# Patient Record
Sex: FEMALE | Race: BLACK OR AFRICAN AMERICAN | HISPANIC OR LATINO | Employment: UNEMPLOYED | URBAN - METROPOLITAN AREA
[De-identification: names, ages, dates, MRNs, and addresses within clinical notes are randomized per-mention and may not be internally consistent; named-entity substitution may affect disease eponyms.]

---
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
## 2024-01-09 PROBLEM — R65.10 SIRS (SYSTEMIC INFLAMMATORY RESPONSE SYNDROME) (HCC): Status: RESOLVED | Noted: 2023-12-16 | Resolved: 2024-01-09

## 2024-01-09 PROBLEM — R79.89 ELEVATED TROPONIN: Status: RESOLVED | Noted: 2022-06-21 | Resolved: 2024-01-09

## 2024-01-09 NOTE — PROGRESS NOTES
Name: Lakhwinder Puckett      : 2005      MRN: [id number]  Encounter Provider: Elias Schoen, MD  Encounter Date: 1/10/2024   Encounter department: [facility]    Assessment & Plan     1. Severe persistent asthma, poorly-controlled, with acute exacerbation  Assessment & Plan:  Patient is no longer having an exacerbation of her asthma.  She has scheduled PFTs next week, she also has pulmonology visit scheduled next week.  Will hold off on Dupixent in order as patient will be seen by pulmonology and they can make the call whether this is warranted at this time.  - Continue current therapy with Breo Ellipta and as needed albuterol  - Follow-up in 2 weeks post pulmonology appointment, may need prior authorization at this time    Orders:  -     fluticasone-vilanterol 200-25 mcg/actuation inhaler; Inhale 1 puff once daily Rinse mouth after use. Take this medication EVERDAY    2. Eosinophilic asthma  Assessment & Plan:  Indication for Dupixent, as well as allergies to multiple environmental allergens.  - Considering patient's current psychiatric troubles would greatly benefit from discontinuation of montelukast, will hopefully be removed from this medication and started on Dupixent with pulmonology's blessing      3. Anxiety  Assessment & Plan:  Patient with very high anxiety partially secondary to underlying medical condition of asthma.  Patient is not getting significant relief from her as needed hydroxyzine 10 mg she has not tried to 20 mg as this makes her nervous.  She would like treatment for her anxiety at this time and is interested in finding therapy as well as taking medication  - We discussed side effects of SSRI and she was understanding, escitalopram started at low-dose will follow-up in 2 weeks to see if increase is needed  - Paperwork filled out for her work to give her another 2 weeks to improve, see her pulmonologist and prepare her to return to work.    Orders:  -      escitalopram (LEXAPRO) 5 mg tablet; Take 1 tablet (5 mg total) by mouth daily        Depression Screening and Follow-up Plan: Patient was screened for depression during today's encounter. They screened negative with a PHQ-2 score of 2.        Subjective      Patient is a 18-year-old female here with follow-up for asthma.    Patient reports she has been doing well since her last visit, she has not had any exacerbations.  She has been taking her controller inhaler daily as well as her montelukast and hydroxyzine.  She was prescribed Dupixent at her last visit as she has a significant allergic component suspected regarding her asthma.  She states she has been taking her albuterol 2 times a day since her last visit with us.  She has PFTs follow-up and 1 week she also has a pulmonology appointment coming up in 1 week.  She reports no other symptoms and had no issues coming off the prednisone.    Dupixent has not been administered as prior authorization is required    Patient reports significant anxiety, she is having difficulty returning to work.  She is very worried about her breathing, she enjoys her we work though.  Mom is worried that she is not getting back out there and is trying to support her but is finding it difficult to motivate her to get outside and go for a walk etc.  Patient is willing to go for a walk and try and be more active and understands that it is the best way to combat her anxiety.      Review of Systems   Constitutional:  Negative for chills and fever.   HENT:  Negative for sore throat.    Respiratory:  Negative for shortness of breath.    Cardiovascular:  Negative for chest pain and palpitations.   Gastrointestinal:  Negative for abdominal pain.   Genitourinary:  Negative for dysuria.   Musculoskeletal:  Negative for myalgias.   Neurological:  Negative for weakness.       Current Outpatient Medications on File Prior to Visit   Medication Sig    albuterol (2.5 mg/3 mL) 0.083 % nebulizer solution  Take 3 mL (2.5 mg total) by nebulization every 6 (six) hours as needed for wheezing or shortness of breath    albuterol (PROVENTIL HFA,VENTOLIN HFA) 90 mcg/act inhaler INHALE 2 PUFFS EVERY 4 HOURS AS NEEDED FOR WHEEZING OR SHORTNESS OF BREATH    benzonatate (TESSALON PERLES) 100 mg capsule Take 1 capsule (100 mg total) by mouth every 8 (eight) hours    dupilumab (DUPIXENT) subcutaneous injection Inject 400 mg under the skin every 14 (fourteen) days for 14 days, THEN 200 mg every 14 (fourteen) days for 28 days.    fluticasone (FLONASE) 50 mcg/act nasal spray 1 spray into each nostril daily    fluticasone-vilanterol 200-25 mcg/actuation inhaler Inhale 1 puff once daily Rinse mouth after use. Take this medication EVERDAY    hydrOXYzine HCL (ATARAX) 10 mg tablet Take 1 tablet (10 mg total) by mouth every 6 (six) hours as needed for anxiety, itching or allergies    montelukast (SINGULAIR) 10 mg tablet Take 1 tablet (10 mg total) by mouth daily at bedtime    predniSONE 10 mg tablet 6 tabs daily for 2 days, then 5 tabs for 2 days then 4 tabs for 2 days then 3 tabs for 2 days then 2 tab for 2 days then 1 tab for 2 days then stop stop       Objective     /60 (BP Location: Left arm, Patient Position: Sitting, Cuff Size: Standard)   Pulse 103   Temp 97.7 °F (36.5 °C) (Temporal)   Wt 88 kg (193 lb 14.4 oz)   SpO2 99%   BMI 33.28 kg/m²     Physical Exam  Constitutional:       Appearance: Normal appearance.   Eyes:      Extraocular Movements: Extraocular movements intact.      Pupils: Pupils are equal, round, and reactive to light.   Cardiovascular:      Rate and Rhythm: Normal rate and regular rhythm.      Heart sounds: Normal heart sounds. No murmur heard.  Pulmonary:      Effort: Pulmonary effort is normal.      Breath sounds: Normal breath sounds. No wheezing.   Musculoskeletal:         General: Normal range of motion.      Cervical back: Normal range of motion.   Skin:     General: Skin is warm.   Neurological:     "  General: No focal deficit present.      Mental Status: She is alert.   Psychiatric:         Mood and Affect: Mood normal.       Portions of the record were created with voice recognition software.  Occasional wrong word or \"sound a like\" substitutions may have occurred due to the inherent limitations of voice recognition software.  Read the chart carefully and recognize, using context, where substitutions have occurred.      Elias Schoen, MD    "

## 2024-01-10 ENCOUNTER — OFFICE VISIT (OUTPATIENT)
Dept: FAMILY MEDICINE CLINIC | Facility: CLINIC | Age: 19
End: 2024-01-10

## 2024-01-10 VITALS
WEIGHT: 193.9 LBS | OXYGEN SATURATION: 99 % | TEMPERATURE: 97.7 F | SYSTOLIC BLOOD PRESSURE: 110 MMHG | DIASTOLIC BLOOD PRESSURE: 60 MMHG | HEART RATE: 103 BPM | BODY MASS INDEX: 33.28 KG/M2

## 2024-01-10 DIAGNOSIS — J45.51 SEVERE PERSISTENT ASTHMA, POORLY-CONTROLLED, WITH ACUTE EXACERBATION: Primary | ICD-10-CM

## 2024-01-10 DIAGNOSIS — J82.83 EOSINOPHILIC ASTHMA: ICD-10-CM

## 2024-01-10 DIAGNOSIS — F41.9 ANXIETY: ICD-10-CM

## 2024-01-10 PROCEDURE — 99213 OFFICE O/P EST LOW 20 MIN: CPT | Performed by: FAMILY MEDICINE

## 2024-01-10 RX ORDER — FLUTICASONE FUROATE AND VILANTEROL 200; 25 UG/1; UG/1
1 POWDER RESPIRATORY (INHALATION)
Qty: 60 BLISTER | Refills: 2 | Status: SHIPPED | OUTPATIENT
Start: 2024-01-10 | End: 2024-04-09

## 2024-01-10 RX ORDER — ESCITALOPRAM OXALATE 5 MG/1
5 TABLET ORAL DAILY
Qty: 30 TABLET | Refills: 1 | Status: SHIPPED | OUTPATIENT
Start: 2024-01-10

## 2024-01-12 NOTE — ASSESSMENT & PLAN NOTE
Indication for Dupixent, as well as allergies to multiple environmental allergens.  - Considering patient's current psychiatric troubles would greatly benefit from discontinuation of montelukast, will hopefully be removed from this medication and started on Dupixent with pulmonology's blessing

## 2024-01-12 NOTE — ASSESSMENT & PLAN NOTE
Patient is no longer having an exacerbation of her asthma.  She has scheduled PFTs next week, she also has pulmonology visit scheduled next week.  Will hold off on Dupixent in order as patient will be seen by pulmonology and they can make the call whether this is warranted at this time.  - Continue current therapy with Breo Ellipta and as needed albuterol  - Follow-up in 2 weeks post pulmonology appointment, may need prior authorization at this time

## 2024-01-12 NOTE — ASSESSMENT & PLAN NOTE
Patient with very high anxiety partially secondary to underlying medical condition of asthma.  Patient is not getting significant relief from her as needed hydroxyzine 10 mg she has not tried to 20 mg as this makes her nervous.  She would like treatment for her anxiety at this time and is interested in finding therapy as well as taking medication  - We discussed side effects of SSRI and she was understanding, escitalopram started at low-dose will follow-up in 2 weeks to see if increase is needed  - Paperwork filled out for her work to give her another 2 weeks to improve, see her pulmonologist and prepare her to return to work.

## 2024-01-18 ENCOUNTER — TELEPHONE (OUTPATIENT)
Dept: FAMILY MEDICINE CLINIC | Facility: CLINIC | Age: 19
End: 2024-01-18

## 2024-01-18 NOTE — TELEPHONE ENCOUNTER
01/18/24    Called Pt and No Answer.     Left Detailed Message that Form is completed and ready to be Picked-Up.      Form copied, scanned into Pt chart and placed at  Pick -Up bin, under the letter M.      Form: PROVIDER EVALUATION

## 2024-01-18 NOTE — TELEPHONE ENCOUNTER
01/18/24    PT SIGNED AND PICKED - UP FORM.      FORM FAXED AND CONFIRMED THAT IT WAS RECEIVED 01/18/24, COPIED AND SCANNED INTO PT CHART .

## 2024-01-19 DIAGNOSIS — J45.51 SEVERE PERSISTENT ASTHMA, POORLY-CONTROLLED, WITH ACUTE EXACERBATION: ICD-10-CM

## 2024-01-19 RX ORDER — MONTELUKAST SODIUM 10 MG/1
10 TABLET ORAL
Qty: 90 TABLET | Refills: 0 | Status: SHIPPED | OUTPATIENT
Start: 2024-01-19

## 2024-01-25 ENCOUNTER — OFFICE VISIT (OUTPATIENT)
Dept: FAMILY MEDICINE CLINIC | Facility: CLINIC | Age: 19
End: 2024-01-25

## 2024-01-25 VITALS
TEMPERATURE: 97.6 F | BODY MASS INDEX: 33.93 KG/M2 | RESPIRATION RATE: 18 BRPM | HEIGHT: 63 IN | DIASTOLIC BLOOD PRESSURE: 62 MMHG | OXYGEN SATURATION: 99 % | HEART RATE: 82 BPM | WEIGHT: 191.5 LBS | SYSTOLIC BLOOD PRESSURE: 120 MMHG

## 2024-01-25 DIAGNOSIS — J45.51 SEVERE PERSISTENT ASTHMA, POORLY-CONTROLLED, WITH ACUTE EXACERBATION: ICD-10-CM

## 2024-01-25 DIAGNOSIS — F41.9 ANXIETY: Primary | ICD-10-CM

## 2024-01-25 PROCEDURE — 99214 OFFICE O/P EST MOD 30 MIN: CPT

## 2024-01-25 RX ORDER — ESCITALOPRAM OXALATE 10 MG/1
10 TABLET ORAL DAILY
Qty: 30 TABLET | Refills: 2 | Status: SHIPPED | OUTPATIENT
Start: 2024-01-25 | End: 2024-01-29

## 2024-01-25 NOTE — PROGRESS NOTES
Name: Lakhwinder Puckett      : 2005      MRN: [id number]  Encounter Provider: JHONY Castro  Encounter Date: 2024   Encounter department: [facility]    Assessment & Plan     1. Anxiety  Assessment & Plan:  Good initial response to Lexapro without side effects.  - Increase Lexapro to therapeutic dose of 10 mg daily.  - Follow up in 4 weeks.      2. Severe persistent asthma, poorly-controlled, with acute exacerbation  Assessment & Plan:  Currently asymptomatic, good response to Breo. Pulmonology has changed Breo to Trelegy but pt has not obtained from pharmacy due to estimated co-pay listed on [facility].  - Continue all medications and plan as per Pulmonology.   - Avoid allergens/triggers whenever possible.   - Advised pt to call/go to pharmacy to learn actual co-pay. If unable to afford, contact Pulmonology office to determine whether samples are available or if patient assistance program or prior authorization needs to be completed for the medication.   - Return to work form completed and returned to pt.             Lakhwinder COTO    Lakhwinder presents to the office accompanied for anxiety follow up and to complete a return to work form. Pt has been out of work since she was hospitalized for asthma exacerbation in December. Pt had not been using her controller medication as she had not had symptoms in some time. Since hospitalization, pt has had follow up with Pulmonology and is on a regimen providing her good symptom control, however has not picked up the Trelegy that they most recently prescribed for her to replace Breo due to the estimated co-pay shown on her pharmacy [facility]. Biologic therapy with Dupixent is being considered if pt continues to have recurrent exacerbations.   Today, pt is feeling well and believes she is ready to return to work. 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 Pt is noticing an improvement in her anxiety with use of Lexapro, without any side effects.       Review of Systems   Constitutional:  Negative for fatigue, fever and unexpected weight change.   Respiratory:  Negative for cough, chest tightness, shortness of breath and wheezing.    Cardiovascular:  Negative for chest pain and palpitations.   Gastrointestinal:  Negative for abdominal pain, diarrhea, nausea and vomiting.   Neurological:  Negative for dizziness, light-headedness and headaches.   Psychiatric/Behavioral:  Negative for dysphoric mood and sleep disturbance. The patient is nervous/anxious (improving).    All other systems reviewed and are negative.      Past Medical History:   Diagnosis Date    Asthma     Leukocytosis     SIRS (systemic inflammatory response syndrome) (HCC)      History reviewed. No pertinent surgical history.  Family History   Problem Relation Age of Onset    Thyroid disease Mother     No Known Problems Father      Social History     Socioeconomic History    Marital status: Single     Spouse name: None    Number of children: None    Years of education: None    Highest education level: None   Occupational History    None   Tobacco Use    Smoking status: Never    Smokeless tobacco: Never   Vaping Use    Vaping status: Never Used   Substance and Sexual Activity    Alcohol use: Not Currently    Drug use: Not Currently    Sexual activity: None   Other Topics Concern    None   Social History Narrative    None     Social Determinants of Health     Financial Resource Strain: Low Risk  (1/25/2024)    Overall Financial Resource Strain (CARDIA)     Difficulty of Paying Living Expenses: Not very hard   Food Insecurity: No Food Insecurity (1/25/2024)    Hunger Vital Sign     Worried About Running Out of Food in the Last Year: Never true     Ran Out of Food in the Last Year: Never true   Transportation Needs: No Transportation Needs (1/25/2024)    PRAPARE - Transportation      Lack of Transportation (Medical): No     Lack of Transportation (Non-Medical): No   Physical Activity: Not on file   Stress: Not on file   Social Connections: Not on file   Intimate Partner Violence: Not on file   Housing Stability: Not on file     Current Outpatient Medications on File Prior to Visit   Medication Sig    levalbuterol (XOPENEX HFA) 45 mcg/act inhaler INHALE 2 PUFFS EVERY 6 HOURS AS NEEDED FOR WHEEZING    montelukast (SINGULAIR) 10 mg tablet TAKE 1 TABLET BY MOUTH DAILY AT BEDTIME    Trelegy Ellipta 200-62.5-25 MCG/ACT AEPB inhaler Inhale 1 puff daily    albuterol (2.5 mg/3 mL) 0.083 % nebulizer solution Take 3 mL (2.5 mg total) by nebulization every 6 (six) hours as needed for wheezing or shortness of breath    albuterol (PROVENTIL HFA,VENTOLIN HFA) 90 mcg/act inhaler INHALE 2 PUFFS EVERY 4 HOURS AS NEEDED FOR WHEEZING OR SHORTNESS OF BREATH    dupilumab (DUPIXENT) subcutaneous injection Inject 400 mg under the skin every 14 (fourteen) days for 14 days, THEN 200 mg every 14 (fourteen) days for 28 days.    fluticasone (FLONASE) 50 mcg/act nasal spray 1 spray into each nostril daily    [DISCONTINUED] fluticasone-vilanterol 200-25 mcg/actuation inhaler Inhale 1 puff once daily Rinse mouth after use. Take this medication EVERDAY     No Known Allergies  Immunization History   Administered Date(s) Administered    COVID-19 PFIZER VACCINE 0.3 ML IM 06/14/2021, 07/12/2021    DTaP 01/30/2006, 07/18/2008    DTaP / Hep B / IPV 2004, 2004, 2004    HPV9 09/23/2016, 08/06/2021    Hep A, ped/adol, 2 dose 09/23/2016    Hep B, Adolescent or Pediatric 2004    Hepatitis A 09/23/2016    Hib (PRP-T) 2004, 2004, 01/10/2005    INFLUENZA 2004, 01/10/2005, 09/23/2016    IPV 07/18/2008    Influenza, injectable, quadrivalent, preservative free 0.5 mL 09/29/2021    MMR 01/30/2006, 07/18/2008    Meningococcal MCV4P 09/23/2016    Meningococcal Polysaccharide (MPSV4) 08/06/2021     "Pneumococcal Conjugate PCV 7 2004, 2004    Pneumococcal Conjugate Vaccine 20-valent (Pcv20), Polysace 12/27/2023    Tdap 09/23/2016    Varicella 01/30/2006, 07/18/2008    meningococcal ACYW-135 TT Conjugate 08/06/2021       Objective     /62 (BP Location: Right arm, Patient Position: Sitting, Cuff Size: Standard)   Pulse 82   Temp 97.6 °F (36.4 °C) (Temporal)   Resp 18   Ht 5' 3\" (1.6 m)   Wt 86.9 kg (191 lb 8 oz)   LMP 01/12/2024   SpO2 99%   BMI 33.92 kg/m²     Physical Exam  Vitals reviewed.   Constitutional:       General: She is not in acute distress.     Appearance: She is obese. She is not ill-appearing or diaphoretic.   HENT:      Head: Normocephalic and atraumatic.      Nose: Nose normal.      Mouth/Throat:      Mouth: Mucous membranes are moist.   Eyes:      General: Lids are normal.      Conjunctiva/sclera: Conjunctivae normal.      Pupils: Pupils are equal, round, and reactive to light.   Cardiovascular:      Rate and Rhythm: Normal rate and regular rhythm.      Heart sounds: Normal heart sounds. No murmur heard.  Pulmonary:      Effort: Pulmonary effort is normal. No tachypnea.      Breath sounds: Normal breath sounds. No decreased breath sounds, wheezing, rhonchi or rales.   Skin:     General: Skin is warm and dry.      Capillary Refill: Capillary refill takes less than 2 seconds.   Neurological:      Mental Status: She is alert and oriented to person, place, and time.   Psychiatric:         Attention and Perception: Attention normal.         Mood and Affect: Mood and affect normal.         Speech: Speech normal.         Behavior: Behavior normal.         Thought Content: Thought content normal.       JHONY Castro    "

## 2024-01-27 DIAGNOSIS — F41.9 ANXIETY: ICD-10-CM

## 2024-01-29 RX ORDER — ESCITALOPRAM OXALATE 10 MG/1
10 TABLET ORAL DAILY
Qty: 90 TABLET | Refills: 1 | Status: SHIPPED | OUTPATIENT
Start: 2024-01-29

## 2024-01-31 ENCOUNTER — TELEPHONE (OUTPATIENT)
Dept: FAMILY MEDICINE CLINIC | Facility: CLINIC | Age: 19
End: 2024-01-31

## 2024-01-31 RX ORDER — FLUTICASONE FUROATE, UMECLIDINIUM BROMIDE AND VILANTEROL TRIFENATATE 200; 62.5; 25 UG/1; UG/1; UG/1
1 POWDER RESPIRATORY (INHALATION) DAILY
COMMUNITY
Start: 2024-01-23 | End: 2024-02-07

## 2024-01-31 RX ORDER — LEVALBUTEROL TARTRATE 45 UG/1
AEROSOL, METERED ORAL
COMMUNITY
Start: 2023-11-21

## 2024-01-31 NOTE — ASSESSMENT & PLAN NOTE
Good initial response to Lexapro without side effects.  - Increase Lexapro to therapeutic dose of 10 mg daily.  - Follow up in 4 weeks.

## 2024-01-31 NOTE — ASSESSMENT & PLAN NOTE
Currently asymptomatic, good response to Breo. Pulmonology has changed Breo to Trelegy but pt has not obtained from pharmacy due to estimated co-pay listed on [facility].  - Continue all medications and plan as per Pulmonology.   - Avoid allergens/triggers whenever possible.   - Advised pt to call/go to pharmacy to learn actual co-pay. If unable to afford, contact Pulmonology office to determine whether samples are available or if patient assistance program or prior authorization needs to be completed for the medication.   - Return to work form completed and returned to pt.

## 2024-01-31 NOTE — TELEPHONE ENCOUNTER
01/31/24    PCP SIGNATURE NEEDED FOR   [facility] Dupixent [facility]     FORM RECEIVED VIA FAX AND PLACED IN PCP FOLDER TO BE DELIVERED AT ASSIGNED TIMES.

## 2024-02-02 ENCOUNTER — TELEPHONE (OUTPATIENT)
Dept: FAMILY MEDICINE CLINIC | Facility: CLINIC | Age: 19
End: 2024-02-02

## 2024-02-07 ENCOUNTER — TELEPHONE (OUTPATIENT)
Dept: FAMILY MEDICINE CLINIC | Facility: CLINIC | Age: 19
End: 2024-02-07

## 2024-02-07 ENCOUNTER — OFFICE VISIT (OUTPATIENT)
Dept: FAMILY MEDICINE CLINIC | Facility: CLINIC | Age: 19
End: 2024-02-07

## 2024-02-07 VITALS
RESPIRATION RATE: 16 BRPM | HEART RATE: 95 BPM | BODY MASS INDEX: 33.84 KG/M2 | OXYGEN SATURATION: 99 % | HEIGHT: 63 IN | TEMPERATURE: 98 F | SYSTOLIC BLOOD PRESSURE: 118 MMHG | WEIGHT: 191 LBS | DIASTOLIC BLOOD PRESSURE: 78 MMHG

## 2024-02-07 DIAGNOSIS — J45.40 MODERATE PERSISTENT ASTHMA WITHOUT COMPLICATION: Primary | ICD-10-CM

## 2024-02-07 DIAGNOSIS — J45.51 SEVERE PERSISTENT ASTHMA, POORLY-CONTROLLED, WITH ACUTE EXACERBATION: Primary | ICD-10-CM

## 2024-02-07 DIAGNOSIS — F41.9 ANXIETY: ICD-10-CM

## 2024-02-07 PROCEDURE — 99213 OFFICE O/P EST LOW 20 MIN: CPT | Performed by: FAMILY MEDICINE

## 2024-02-07 RX ORDER — PREDNISONE 20 MG/1
40 TABLET ORAL DAILY
Qty: 8 TABLET | Refills: 0 | Status: SHIPPED | OUTPATIENT
Start: 2024-02-07

## 2024-02-07 RX ORDER — ALBUTEROL SULFATE 90 UG/1
2 AEROSOL, METERED RESPIRATORY (INHALATION) 4 TIMES DAILY
Qty: 18 G | Refills: 2 | Status: SHIPPED | OUTPATIENT
Start: 2024-02-07

## 2024-02-07 RX ORDER — FLUTICASONE FUROATE AND VILANTEROL 200; 25 UG/1; UG/1
1 POWDER RESPIRATORY (INHALATION) DAILY
Qty: 180 BLISTER | Refills: 3 | Status: SHIPPED | OUTPATIENT
Start: 2024-02-07 | End: 2025-02-01

## 2024-02-07 RX ORDER — BUDESONIDE AND FORMOTEROL FUMARATE DIHYDRATE 160; 4.5 UG/1; UG/1
2 AEROSOL RESPIRATORY (INHALATION) 2 TIMES DAILY
Qty: 30.6 G | Refills: 3 | Status: SHIPPED | OUTPATIENT
Start: 2024-02-07 | End: 2024-02-07

## 2024-02-07 NOTE — TELEPHONE ENCOUNTER
Patient came in and was advised by pharmacy that following medication (budesonide-formoterol (SYMBICORT) 160-4.5 mcg/act inhaler ) is not covered by insurance. Please send Cosme Ellipta 200-62.5-25 MCG/ACT AEPB inhaler.    
DC by MD/Private car

## 2024-02-07 NOTE — PATIENT INSTRUCTIONS
If you have an exacerbation take 2 pills of the prednisone.  Make sure you are seen by medical professional the next day or at least speak to myself    Please follow-up at the pharmacy and ask about your co-pay for medications and if there is a deductible on the plan and if this is met the medications will be covered fully by the insurance.  Also ask which inhalers are best in terms of price and co-pay.    Take the Symbicort as you would take the Advair 2 puffs daily if you are able to get this medication.  Please reach out if you are unable to get a controller medication (controller medications have a steroid that keeps the inflammation down in the airways)    I would very much like you to have albuterol on hand this would be either a nebulizer or a pump this can be used if you have symptoms but not a severe exacerbation    If you have a severe exacerbation you should follow-up in the emergency room such as you cannot breathe.    If you are having an exacerbation take a nebulizer then repeat in 1 hour if you are still having symptoms, repeat again in 1 hour if you are having symptoms

## 2024-02-07 NOTE — PROGRESS NOTES
Name: Lakhwinder Puckett      : 2005      MRN: [id number]  Encounter Provider: Elias Schoen, MD  Encounter Date: 2024   Encounter department: [facility]    Assessment & Plan     1. Moderate persistent asthma without complication  Assessment & Plan:  Holding off on Dupixent at this time as patient appears to be well-controlled on the Breo Ellipta.  Will consider Dupixent if she has exacerbations on adequate controller medications.  - Working with patient to find appropriate option for controller inhaler, Breo Ellipta appears to be preferred  - Alternate therapy could be a budesonide nebulizer if inhalers are financially unviable  - Albuterol inhaler provided to patient  - Instructions regarding acute exacerbation including back-to-back albuterol treatments x 3 as well as pocket prescription of prednisone, patient to take 40 mg x 1 and make sure she is seen by a medical professional or discusses her exacerbation with provider the next day  -Concerning patient's past collapses with hypoxic events there is concern for vocal cord dysfunction per pulmonology and ED providers, ENT referral provided family aware and willing to follow this up    Orders:  -     albuterol (PROVENTIL HFA,VENTOLIN HFA) 90 mcg/act inhaler; Inhale 2 puffs 4 (four) times a day  -     Ambulatory Referral to Otolaryngology; Future  -     predniSONE 20 mg tablet; Take 2 tablets (40 mg total) by mouth daily  -     fluticasone-vilanterol 200-25 mcg/actuation inhaler; Inhale 1 puff daily Rinse mouth after use.    2. Anxiety  Assessment & Plan:  Well-controlled at this time continue escitalopram 10 mg daily             Subjective      Patient is a 18-year-old female here for follow-up asthma and depression/anxiety    Patient reports she is doing much better on the escitalopram 10 mg.  She is getting ready to go back to work next week.    Patient was seen by her pulmonologist for the first visit.  He stated that  she should be on a controller inhaler, he prescribed her Trelegy Ellipta as he Breo Ellipta had a $400 co-pay.  Cosme though had a $600 co-pay when they went to the pharmacy.  She is currently using the rest of her Breo Ellipta but is concerned about finding an alternative.  They are not sure if she has a deductible that requires them to meet each year as in the past they had free prescriptions of the Breo.  Patient states that her breathing has been fine and she feels ready to go back to work from a pulmonary perspective.    Patient was somewhat aware of the concern for vocal cord dysfunction that caused her to collapse.  She is willing to get further evaluation with ENT regarding this issue.      Review of Systems   Constitutional:  Negative for chills and fever.   HENT:  Negative for sore throat.    Respiratory:  Negative for chest tightness, shortness of breath and wheezing.    Cardiovascular:  Negative for chest pain and palpitations.   Gastrointestinal:  Negative for abdominal pain.   Genitourinary:  Negative for dysuria.   Musculoskeletal:  Negative for myalgias.   Neurological:  Negative for weakness.   Psychiatric/Behavioral:  The patient is nervous/anxious.        Current Outpatient Medications on File Prior to Visit   Medication Sig    montelukast (SINGULAIR) 10 mg tablet TAKE 1 TABLET BY MOUTH DAILY AT BEDTIME    albuterol (2.5 mg/3 mL) 0.083 % nebulizer solution Take 3 mL (2.5 mg total) by nebulization every 6 (six) hours as needed for wheezing or shortness of breath    dupilumab (DUPIXENT) subcutaneous injection Inject 400 mg under the skin every 14 (fourteen) days for 14 days, THEN 200 mg every 14 (fourteen) days for 28 days.    escitalopram (LEXAPRO) 10 mg tablet TAKE 1 TABLET BY MOUTH EVERY DAY    fluticasone (FLONASE) 50 mcg/act nasal spray 1 spray into each nostril daily    levalbuterol (XOPENEX HFA) 45 mcg/act inhaler INHALE 2 PUFFS EVERY 6 HOURS AS NEEDED FOR WHEEZING    [DISCONTINUED]  "albuterol (PROVENTIL HFA,VENTOLIN HFA) 90 mcg/act inhaler INHALE 2 PUFFS EVERY 4 HOURS AS NEEDED FOR WHEEZING OR SHORTNESS OF BREATH    [DISCONTINUED] Trelegy Ellipta 200-62.5-25 MCG/ACT AEPB inhaler Inhale 1 puff daily       Objective     /78 (BP Location: Left arm, Patient Position: Sitting, Cuff Size: Standard)   Pulse 95   Temp 98 °F (36.7 °C) (Temporal)   Resp 16   Ht 5' 3\" (1.6 m)   Wt 86.6 kg (191 lb)   LMP 01/12/2024   SpO2 99%   BMI 33.83 kg/m²     Physical Exam  Constitutional:       Appearance: Normal appearance.   Eyes:      Extraocular Movements: Extraocular movements intact.      Pupils: Pupils are equal, round, and reactive to light.   Cardiovascular:      Rate and Rhythm: Normal rate and regular rhythm.   Pulmonary:      Effort: Pulmonary effort is normal. No respiratory distress.      Breath sounds: Normal breath sounds.   Musculoskeletal:         General: Normal range of motion.      Cervical back: Normal range of motion.   Skin:     General: Skin is warm.   Neurological:      General: No focal deficit present.      Mental Status: She is alert.   Psychiatric:         Mood and Affect: Mood normal.       Portions of the record were created with voice recognition software.  Occasional wrong word or \"sound a like\" substitutions may have occurred due to the inherent limitations of voice recognition software.  Read the chart carefully and recognize, using context, where substitutions have occurred.      Elias Schoen, MD    "

## 2024-02-07 NOTE — ASSESSMENT & PLAN NOTE
Holding off on Dupixent at this time as patient appears to be well-controlled on the Breo Ellipta.  Will consider Dupixent if she has exacerbations on adequate controller medications.  - Working with patient to find appropriate option for controller inhaler, Breo Ellipta appears to be preferred  - Alternate therapy could be a budesonide nebulizer if inhalers are financially unviable  - Albuterol inhaler provided to patient  - Instructions regarding acute exacerbation including back-to-back albuterol treatments x 3 as well as pocket prescription of prednisone, patient to take 40 mg x 1 and make sure she is seen by a medical professional or discusses her exacerbation with provider the next day  -Concerning patient's past collapses with hypoxic events there is concern for vocal cord dysfunction per pulmonology and ED providers, ENT referral provided family aware and willing to follow this up

## 2024-04-05 ENCOUNTER — OFFICE VISIT (OUTPATIENT)
Dept: FAMILY MEDICINE CLINIC | Facility: CLINIC | Age: 19
End: 2024-04-05

## 2024-04-05 VITALS
HEART RATE: 94 BPM | HEIGHT: 63 IN | RESPIRATION RATE: 16 BRPM | OXYGEN SATURATION: 99 % | DIASTOLIC BLOOD PRESSURE: 69 MMHG | SYSTOLIC BLOOD PRESSURE: 110 MMHG | TEMPERATURE: 98 F | BODY MASS INDEX: 33.49 KG/M2 | WEIGHT: 189 LBS

## 2024-04-05 DIAGNOSIS — F41.9 ANXIETY: ICD-10-CM

## 2024-04-05 DIAGNOSIS — J82.83 EOSINOPHILIC ASTHMA: ICD-10-CM

## 2024-04-05 DIAGNOSIS — J45.40 MODERATE PERSISTENT ASTHMA WITHOUT COMPLICATION: Primary | ICD-10-CM

## 2024-04-05 RX ORDER — BUDESONIDE 0.5 MG/2ML
0.5 INHALANT ORAL 2 TIMES DAILY
Qty: 120 ML | Refills: 1 | Status: SHIPPED | OUTPATIENT
Start: 2024-04-05 | End: 2024-06-04

## 2024-04-05 RX ORDER — CETIRIZINE HYDROCHLORIDE 10 MG/1
10 TABLET ORAL DAILY
Qty: 30 TABLET | Refills: 1 | Status: SHIPPED | OUTPATIENT
Start: 2024-04-05

## 2024-04-05 RX ORDER — BUDESONIDE 0.5 MG/2ML
0.5 INHALANT ORAL 2 TIMES DAILY
Qty: 120 ML | Refills: 1 | Status: SHIPPED | OUTPATIENT
Start: 2024-04-05 | End: 2024-04-05 | Stop reason: SDUPTHER

## 2024-04-05 RX ORDER — ALBUTEROL SULFATE 90 UG/1
2 AEROSOL, METERED RESPIRATORY (INHALATION) 4 TIMES DAILY
Qty: 18 G | Refills: 4 | Status: SHIPPED | OUTPATIENT
Start: 2024-04-05

## 2024-04-05 NOTE — PROGRESS NOTES
Name: Lakhwinder Puckett      : 2005      MRN: [id number]  Encounter Provider: Elias Schoen, MD  Encounter Date: 2024   Encounter department: [facility]    Assessment & Plan     1. Moderate persistent asthma without complication  Assessment & Plan:  Due to inability to get either the fluticasone-vilanterol or fluticasone-umeclidinium-vilanterol inhaler and patient's symptoms are poorly controlled at this time likely due to a combination of environmental triggers which we have confirmed with significant allergies to multiple triggers as well as lack of controller medication at this time.  - Starting patient on twice daily Pulmicort nebulizer, looked up over-the-counter cost with good Rx and patient is willing to pay the 40 something dollar monthly cost of this medication if it will help her symptoms  - Follow-up in 4 weeks or sooner if symptoms are not well-controlled on new regimen  -At next visit will also discuss Dupixent as a potential option going forward, we started down this path at prior visits and think it might be a good option if patient has good coverage of this medication.  - July pulmonology follow-up scheduled    Orders:  -     albuterol (PROVENTIL HFA,VENTOLIN HFA) 90 mcg/act inhaler; Inhale 2 puffs 4 (four) times a day  -     budesonide (PULMICORT) 0.5 mg/2 mL nebulizer solution; Take 2 mL (0.5 mg total) by nebulization 2 (two) times a day Rinse mouth after use.    2. Eosinophilic asthma  Assessment & Plan:  Will also start patient on daily Zyrtec to see if patient's allergies will be better controlled and hopefully contribute less to any further exacerbations    Orders:  -     cetirizine (ZyrTEC) 10 mg tablet; Take 1 tablet (10 mg total) by mouth daily    3. Anxiety  Assessment & Plan:  Anxiety better controlled on 10 mg of escitalopram daily  - Could consider adding BuSpar as an adjunct therapy for as needed use, will discuss at future visits once asthma is  better controlled and contributing last to her anxiety symptoms        BMI Counseling: Body mass index is 33.48 kg/m². The BMI is above normal. Exercise recommendations include moderate physical activity 150 minutes/week. No pharmacotherapy was ordered. Patient referred to PCP. Rationale for BMI follow-up plan is due to patient being overweight or obese.         Subjective      Patient is a 18-year-old female here for follow-up anxiety and asthma    Patient states she is doing quite well in terms of her anxiety.  She is going to work daily despite anxiety prior to arriving at work.  She is unclear what she is anxious about but feels that her symptoms are relatively well-controlled.  They are at least not inhibiting her ability to perform at work or stay at work for her shift.  She feels that her medication is at a good dosing currently.  She does not need any adjunct therapy at this time.    Patient's asthma is not well-controlled right now.  The last couple weeks she has been having more symptoms, she is taking her albuterol about twice a day as a nebulizer.  She feels like the neb helps her symptoms more than the inhaler.  She states that at work she often starts to feel wheezy and tight by the end of the day.  She takes her inhaler as needed.  She does not take current nebulizer at work due to the fact that it increases her anxiety and heart rate.  She has had to leave work early a couple times.    The big issue right now is that she cannot afford any of the inhalers that we prescribed.  We tried Symbicort which is not covered well on her plan.  Pulmonology tried Trelegy which they thought was better covered as it shows up as a tier 1 on her plan but is not more affordable when she goes to pick it up at the pharmacy.  At this time she is not on any controller medication besides the Singulair which she feels like helps her symptoms.      Review of Systems   Constitutional:  Negative for chills and fever.   HENT:   "Negative for sore throat.    Respiratory:  Negative for shortness of breath.    Cardiovascular:  Negative for chest pain and palpitations.   Gastrointestinal:  Negative for abdominal pain.   Genitourinary:  Negative for dysuria.   Musculoskeletal:  Negative for myalgias.   Neurological:  Negative for weakness.       Current Outpatient Medications on File Prior to Visit   Medication Sig    montelukast (SINGULAIR) 10 mg tablet TAKE 1 TABLET BY MOUTH DAILY AT BEDTIME    albuterol (2.5 mg/3 mL) 0.083 % nebulizer solution Take 3 mL (2.5 mg total) by nebulization every 6 (six) hours as needed for wheezing or shortness of breath    albuterol (PROVENTIL HFA,VENTOLIN HFA) 90 mcg/act inhaler Inhale 2 puffs 4 (four) times a day    escitalopram (LEXAPRO) 10 mg tablet TAKE 1 TABLET BY MOUTH EVERY DAY    fluticasone (FLONASE) 50 mcg/act nasal spray 1 spray into each nostril daily    fluticasone-umeclidinium-vilanterol 200-62.5-25 mcg/actuation AEPB inhaler Inhale 1 puff daily Rinse mouth after use.    fluticasone-vilanterol 200-25 mcg/actuation inhaler Inhale 1 puff daily Rinse mouth after use.    levalbuterol (XOPENEX HFA) 45 mcg/act inhaler INHALE 2 PUFFS EVERY 6 HOURS AS NEEDED FOR WHEEZING    predniSONE 20 mg tablet Take 2 tablets (40 mg total) by mouth daily       Objective     /69 (BP Location: Right arm, Patient Position: Sitting, Cuff Size: Large)   Pulse 94   Temp 98 °F (36.7 °C) (Temporal)   Resp 16   Ht 5' 3\" (1.6 m)   Wt 85.7 kg (189 lb)   LMP 03/21/2024 (Exact Date)   SpO2 99%   BMI 33.48 kg/m²     Physical Exam  Constitutional:       Appearance: Normal appearance.   Eyes:      Extraocular Movements: Extraocular movements intact.      Pupils: Pupils are equal, round, and reactive to light.   Cardiovascular:      Rate and Rhythm: Normal rate and regular rhythm.      Heart sounds: Normal heart sounds.   Pulmonary:      Effort: Pulmonary effort is normal. No respiratory distress.      Breath sounds: Normal " "breath sounds. No wheezing.   Musculoskeletal:         General: Normal range of motion.      Cervical back: Normal range of motion.   Skin:     General: Skin is warm.   Neurological:      General: No focal deficit present.      Mental Status: She is alert.   Psychiatric:         Mood and Affect: Mood normal.       Portions of the record were created with voice recognition software.  Occasional wrong word or \"sound a like\" substitutions may have occurred due to the inherent limitations of voice recognition software.  Read the chart carefully and recognize, using context, where substitutions have occurred.      Elias Schoen, MD    "

## 2024-04-07 PROBLEM — J45.50 SEVERE PERSISTENT ASTHMA WITHOUT COMPLICATION: Status: ACTIVE | Noted: 2023-12-27

## 2024-04-07 PROBLEM — J45.40 MODERATE PERSISTENT ASTHMA WITHOUT COMPLICATION: Status: ACTIVE | Noted: 2024-04-07

## 2024-04-07 NOTE — ASSESSMENT & PLAN NOTE
Due to inability to get either the fluticasone-vilanterol or fluticasone-umeclidinium-vilanterol inhaler and patient's symptoms are poorly controlled at this time likely due to a combination of environmental triggers which we have confirmed with significant allergies to multiple triggers as well as lack of controller medication at this time.  - Starting patient on twice daily Pulmicort nebulizer, looked up over-the-counter cost with good Rx and patient is willing to pay the 40 something dollar monthly cost of this medication if it will help her symptoms  - Follow-up in 4 weeks or sooner if symptoms are not well-controlled on new regimen  -At next visit will also discuss Dupixent as a potential option going forward, we started down this path at prior visits and think it might be a good option if patient has good coverage of this medication.  - July pulmonology follow-up scheduled

## 2024-04-07 NOTE — ASSESSMENT & PLAN NOTE
Anxiety better controlled on 10 mg of escitalopram daily  - Could consider adding BuSpar as an adjunct therapy for as needed use, will discuss at future visits once asthma is better controlled and contributing last to her anxiety symptoms

## 2024-04-07 NOTE — ASSESSMENT & PLAN NOTE
Will also start patient on daily Zyrtec to see if patient's allergies will be better controlled and hopefully contribute less to any further exacerbations

## 2024-04-14 ENCOUNTER — HOSPITAL ENCOUNTER (EMERGENCY)
Facility: HOSPITAL | Age: 19
Discharge: HOME | End: 2024-04-14
Attending: EMERGENCY MEDICINE | Admitting: EMERGENCY MEDICINE
Payer: COMMERCIAL

## 2024-04-14 VITALS
SYSTOLIC BLOOD PRESSURE: 113 MMHG | RESPIRATION RATE: 19 BRPM | OXYGEN SATURATION: 94 % | TEMPERATURE: 97.9 F | HEART RATE: 122 BPM | DIASTOLIC BLOOD PRESSURE: 56 MMHG

## 2024-04-14 DIAGNOSIS — J45.901 ASTHMA EXACERBATION: Primary | ICD-10-CM

## 2024-04-14 PROCEDURE — 94644 CONT INHLJ TX 1ST HOUR: CPT

## 2024-04-14 PROCEDURE — 96372 THER/PROPH/DIAG INJ SC/IM: CPT

## 2024-04-14 PROCEDURE — 99291 CRITICAL CARE FIRST HOUR: CPT | Performed by: EMERGENCY MEDICINE

## 2024-04-14 PROCEDURE — 94664 DEMO&/EVAL PT USE INHALER: CPT

## 2024-04-14 PROCEDURE — 96374 THER/PROPH/DIAG INJ IV PUSH: CPT

## 2024-04-14 PROCEDURE — 94645 CONT INHLJ TX EACH ADDL HOUR: CPT

## 2024-04-14 PROCEDURE — 94640 AIRWAY INHALATION TREATMENT: CPT

## 2024-04-14 PROCEDURE — 94760 N-INVAS EAR/PLS OXIMETRY 1: CPT

## 2024-04-14 PROCEDURE — 99285 EMERGENCY DEPT VISIT HI MDM: CPT

## 2024-04-14 RX ORDER — MAGNESIUM SULFATE 500 MG/ML
1 VIAL (ML) INJECTION ONCE
Status: COMPLETED | OUTPATIENT
Start: 2024-04-14 | End: 2024-04-14

## 2024-04-14 RX ORDER — IPRATROPIUM BROMIDE AND ALBUTEROL SULFATE .5; 3 MG/3ML; MG/3ML
1 SOLUTION RESPIRATORY (INHALATION) ONCE
Status: COMPLETED | OUTPATIENT
Start: 2024-04-14 | End: 2024-04-14

## 2024-04-14 RX ORDER — EPINEPHRINE 1 MG/ML
1 INJECTION, SOLUTION, CONCENTRATE INTRAVENOUS ONCE
Status: COMPLETED | OUTPATIENT
Start: 2024-04-14 | End: 2024-04-14

## 2024-04-14 RX ORDER — ALBUTEROL SULFATE 2.5 MG/3ML
SOLUTION RESPIRATORY (INHALATION)
Status: COMPLETED
Start: 2024-04-14 | End: 2024-04-14

## 2024-04-14 RX ORDER — EPINEPHRINE 1 MG/ML
0.3 INJECTION, SOLUTION, CONCENTRATE INTRAVENOUS ONCE
Status: COMPLETED | OUTPATIENT
Start: 2024-04-14 | End: 2024-04-14

## 2024-04-14 RX ORDER — KETOROLAC TROMETHAMINE 30 MG/ML
15 INJECTION, SOLUTION INTRAMUSCULAR; INTRAVENOUS ONCE
Status: COMPLETED | OUTPATIENT
Start: 2024-04-14 | End: 2024-04-14

## 2024-04-14 RX ORDER — ALBUTEROL SULFATE 2.5 MG/3ML
1 SOLUTION RESPIRATORY (INHALATION) ONCE
Status: COMPLETED | OUTPATIENT
Start: 2024-04-14 | End: 2024-04-14

## 2024-04-14 RX ORDER — METHYLPREDNISOLONE SOD SUCC 125 MG
1 VIAL (EA) INJECTION ONCE
Status: COMPLETED | OUTPATIENT
Start: 2024-04-14 | End: 2024-04-14

## 2024-04-14 RX ORDER — SODIUM CHLORIDE FOR INHALATION 0.9 %
12 VIAL, NEBULIZER (ML) INHALATION ONCE
Status: COMPLETED | OUTPATIENT
Start: 2024-04-14 | End: 2024-04-14

## 2024-04-14 RX ORDER — SODIUM CHLORIDE FOR INHALATION 0.9 %
12 VIAL, NEBULIZER (ML) INHALATION ONCE
Status: DISCONTINUED | OUTPATIENT
Start: 2024-04-14 | End: 2024-04-14 | Stop reason: HOSPADM

## 2024-04-14 RX ORDER — ALBUTEROL SULFATE 2.5 MG/3ML
2.5 SOLUTION RESPIRATORY (INHALATION) EVERY 6 HOURS PRN
Qty: 75 ML | Refills: 0 | Status: SHIPPED | OUTPATIENT
Start: 2024-04-14

## 2024-04-14 RX ORDER — PREDNISONE 20 MG/1
60 TABLET ORAL DAILY
Qty: 15 TABLET | Refills: 0 | Status: SHIPPED | OUTPATIENT
Start: 2024-04-14

## 2024-04-14 RX ADMIN — ALBUTEROL SULFATE 10 MG: 2.5 SOLUTION RESPIRATORY (INHALATION) at 14:00

## 2024-04-14 RX ADMIN — ALBUTEROL SULFATE 10 MG: 2.5 SOLUTION RESPIRATORY (INHALATION) at 15:14

## 2024-04-14 RX ADMIN — EPINEPHRINE 0.3 MG: 1 INJECTION, SOLUTION, CONCENTRATE INTRAVENOUS at 14:13

## 2024-04-14 RX ADMIN — IPRATROPIUM BROMIDE 1 MG: 0.5 SOLUTION RESPIRATORY (INHALATION) at 15:14

## 2024-04-14 RX ADMIN — IPRATROPIUM BROMIDE 1 MG: 0.5 SOLUTION RESPIRATORY (INHALATION) at 14:00

## 2024-04-14 RX ADMIN — KETOROLAC TROMETHAMINE 15 MG: 30 INJECTION, SOLUTION INTRAMUSCULAR; INTRAVENOUS at 15:26

## 2024-04-14 RX ADMIN — Medication 1 MG: at 14:00

## 2024-04-14 RX ADMIN — ISODIUM CHLORIDE 12 ML: 0.03 SOLUTION RESPIRATORY (INHALATION) at 15:14

## 2024-04-14 NOTE — ED PROVIDER NOTES
History  Chief Complaint   Patient presents with    Asthma     Pt reports feeling SOB and chest tightness an hour ago. Hx asthma. Per EMS on arrival pt was ashen, cyanotic. Given IM epi, solumedrol, duo neb, & albuterol.      19 YO female presents with shortness of breath, Hx of asthma. Patient was at work when symptoms began, she developed chest tightness and increased work of breathing. She states has had issues with asthma in the past but had been doing well recently. EMS was called when she began to struggle to breathe. On EMS arrival she was found to be in respiratory distress, appeared ashen. She was placed on oxygen with albuterol. She was given solumedrol, magnesium and IM epinephrine. Arrived at the ED with continuing respiratory distress though apparently some improvement. She has had admissions for asthma in the past. She is unsure regarding triggers. Pt denies CP/F/C/N/V/D/C, no dysuria, burning on urination or blood in urine.       History provided by:  Patient and EMS personnel   used: No        Prior to Admission Medications   Prescriptions Last Dose Informant Patient Reported? Taking?   albuterol (2.5 mg/3 mL) 0.083 % nebulizer solution 2024  No Yes   Sig: Take 3 mL (2.5 mg total) by nebulization every 6 (six) hours as needed for wheezing or shortness of breath   albuterol (PROVENTIL HFA,VENTOLIN HFA) 90 mcg/act inhaler 2024  No Yes   Sig: Inhale 2 puffs 4 (four) times a day   budesonide (PULMICORT) 0.5 mg/2 mL nebulizer solution Past Week  No Yes   Sig: Take 2 mL (0.5 mg total) by nebulization 2 (two) times a day Rinse mouth after use.   cetirizine (ZyrTEC) 10 mg tablet 2024  No Yes   Sig: Take 1 tablet (10 mg total) by mouth daily   escitalopram (LEXAPRO) 10 mg tablet 2024  No Yes   Sig: TAKE 1 TABLET BY MOUTH EVERY DAY   fluticasone (FLONASE) 50 mcg/act nasal spray Unknown  Yes No   Si spray into each nostril daily   fluticasone-umeclidinium-vilanterol  200-62.5-25 mcg/actuation AEPB inhaler Not Taking  No No   Sig: Inhale 1 puff daily Rinse mouth after use.   Patient not taking: Reported on 4/14/2024   fluticasone-vilanterol 200-25 mcg/actuation inhaler Not Taking  No No   Sig: Inhale 1 puff daily Rinse mouth after use.   Patient not taking: Reported on 4/14/2024   levalbuterol (XOPENEX HFA) 45 mcg/act inhaler Not Taking  Yes No   Sig: INHALE 2 PUFFS EVERY 6 HOURS AS NEEDED FOR WHEEZING   Patient not taking: Reported on 4/14/2024   montelukast (SINGULAIR) 10 mg tablet 4/13/2024  No Yes   Sig: TAKE 1 TABLET BY MOUTH DAILY AT BEDTIME      Facility-Administered Medications: None       Past Medical History:   Diagnosis Date    Asthma     Leukocytosis     SIRS (systemic inflammatory response syndrome) (HCC)        History reviewed. No pertinent surgical history.    Family History   Problem Relation Age of Onset    Thyroid disease Mother     No Known Problems Father      I have reviewed and agree with the history as documented.    E-Cigarette/Vaping    E-Cigarette Use Never User      E-Cigarette/Vaping Substances    Nicotine No     THC No     CBD No     Flavoring No     Other No     Unknown No      Social History     Tobacco Use    Smoking status: Never    Smokeless tobacco: Never   Vaping Use    Vaping status: Never Used   Substance Use Topics    Alcohol use: Not Currently    Drug use: Not Currently       Review of Systems   Constitutional:  Negative for chills, fatigue and fever.   HENT:  Negative for dental problem.    Eyes:  Negative for visual disturbance.   Respiratory:  Positive for chest tightness and shortness of breath.    Cardiovascular:  Negative for chest pain.   Gastrointestinal:  Negative for abdominal pain, diarrhea and vomiting.   Genitourinary:  Negative for dysuria and frequency.   Musculoskeletal:  Negative for arthralgias.   Skin:  Negative for rash.   Neurological:  Negative for dizziness, weakness and light-headedness.   Psychiatric/Behavioral:   Negative for agitation, behavioral problems and confusion.    All other systems reviewed and are negative.      Physical Exam  Physical Exam  Vitals and nursing note reviewed.   Constitutional:       Appearance: Normal appearance.   HENT:      Head: Normocephalic and atraumatic.   Eyes:      Extraocular Movements: Extraocular movements intact.      Conjunctiva/sclera: Conjunctivae normal.   Cardiovascular:      Rate and Rhythm: Tachycardia present.      Pulses: Normal pulses.   Pulmonary:      Effort: Respiratory distress present.      Comments: Decreased breath sounds B/L.  Abdominal:      General: There is no distension.   Musculoskeletal:         General: Normal range of motion.      Cervical back: Normal range of motion.   Skin:     Findings: No rash.   Neurological:      General: No focal deficit present.      Mental Status: She is alert.      Cranial Nerves: No cranial nerve deficit.   Psychiatric:         Mood and Affect: Mood normal.         Vital Signs  ED Triage Vitals   Temperature Pulse Respirations Blood Pressure SpO2   04/14/24 1435 04/14/24 1400 04/14/24 1400 04/14/24 1400 04/14/24 1400   97.9 °F (36.6 °C) (!) 124 (!) 26 137/81 98 %      Temp Source Heart Rate Source Patient Position - Orthostatic VS BP Location FiO2 (%)   04/14/24 1435 04/14/24 1401 04/14/24 1401 04/14/24 1401 --   Temporal Monitor Lying Right arm       Pain Score       04/14/24 1526       3           Vitals:    04/14/24 1645 04/14/24 1700 04/14/24 1745 04/14/24 1830   BP: 98/51 106/51 106/52 113/56   Pulse: (!) 128 (!) 133 (!) 127 (!) 122   Patient Position - Orthostatic VS: Sitting Sitting Sitting Sitting         Visual Acuity      ED Medications  Medications   albuterol inhalation solution 10 mg (0 mg Nebulization Hold 4/14/24 1400)   sodium chloride 0.9 % inhalation solution 12 mL ( Nebulization Canceled Entry 4/14/24 1409)   albuterol (2.5 mg/3 mL) 0.083 % inhalation solution **ADS Override Pull** (10 mg Nebulization Given  4/14/24 1400)   ipratropium (ATROVENT) 0.02 % inhalation solution 1 mg (1 mg Nebulization Given 4/14/24 1400)   ipratropium-albuterol (FOR EMS ONLY) (DUO-NEB) 0.5-2.5 mg/3 mL inhalation solution 3 mL (0 mL Does not apply Given to EMS 4/14/24 1403)   albuterol (FOR EMS ONLY) (2.5 mg/3 mL) 0.083 % inhalation solution 2.5 mg (0 each Does not apply Given to EMS 4/14/24 1414)   EPINEPHrine PF (FOR EMS ONLY) (ADRENALIN) 1 mg/mL injection 1 mg (0 mg Does not apply Given to EMS 4/14/24 1403)   magnesium sulfate (FOR EMS ONLY) 1 g/2 mL injection 1 g (0 g Does not apply Given to EMS 4/14/24 1404)   methylPREDNISolone sodium succinate (FOR EMS ONLY) (Solu-MEDROL) 125 MG injection 125 mg (0 mg Does not apply Given to EMS 4/14/24 1404)   EPINEPHrine PF (ADRENALIN) 1 mg/mL injection 0.3 mg (0.3 mg Intramuscular Given 4/14/24 1413)   albuterol inhalation solution 10 mg (10 mg Nebulization Given 4/14/24 1514)   ipratropium (ATROVENT) 0.02 % inhalation solution 1 mg (1 mg Nebulization Given 4/14/24 1514)   sodium chloride 0.9 % inhalation solution 12 mL (12 mL Nebulization Given 4/14/24 1514)   ketorolac (TORADOL) injection 15 mg (15 mg Intravenous Given 4/14/24 1526)       Diagnostic Studies  Results Reviewed       None                   No orders to display              Procedures  CriticalCare Time    Date/Time: 4/14/2024 8:04 PM    Performed by: Michelet Grubbs MD  Authorized by: Michelet Grubbs MD    Critical care provider statement:     Critical care time (minutes):  35    Critical care time was exclusive of:  Separately billable procedures and treating other patients and teaching time    Critical care was time spent personally by me on the following activities:  Blood draw for specimens, obtaining history from patient or surrogate, development of treatment plan with patient or surrogate, discussions with consultants, evaluation of patient's response to treatment, examination of patient, interpretation of cardiac output  "measurements, ordering and performing treatments and interventions, ordering and review of laboratory studies, ordering and review of radiographic studies, re-evaluation of patient's condition and review of old charts  Comments:      Patient requiring MACEDO neb in department.           ED Course  ED Course as of 04/2004   Sun Apr 14, 2024   1424 Patient appears to have some improvement, decreased work of breathing. Will continue treatments.   1645 Patient sitting up in bed, off oxygen, minimal wheezing on exam but breathing has greatly improved.    1748 Patient states she continues to feel will   1825 Patient continues to feel improved after 4 hours of observation. Will discharge with prescriptions for albuterol and prednisone. Did provide strict return protocols.         CRAFFT      Flowsheet Row Most Recent Value   CRAFFT Initial Screen: During the past 12 months, did you:    1. Drink any alcohol (more than a few sips)?  No Filed at: 04/14/2024 1401   2. Smoke any marijuana or hashish No Filed at: 04/14/2024 1401   3. Use anything else to get high? (\"anything else\" includes illegal drugs, over the counter and prescription drugs, and things that you sniff or 'patiño')? No Filed at: 04/14/2024 1401                                            Medical Decision Making  1. Asthma exacerbation - Patient with symptoms beginning 1 hour PTA, arrives in respiratory distress after receiving epinephrine, solumedrol and epinephrine. Will redose epinephrine, give MACEDO neb treatment, monitor closely as patient may require NIPPV Vs intubation.    Problems Addressed:  Asthma exacerbation: acute illness or injury    Risk  Prescription drug management.             Disposition  Final diagnoses:   Asthma exacerbation     Time reflects when diagnosis was documented in both MDM as applicable and the Disposition within this note       Time User Action Codes Description Comment    4/14/2024  6:26 PM Michelet Grubbs Add [J45.901] Asthma " exacerbation           ED Disposition       ED Disposition   Discharge    Condition   Stable    Date/Time   Sun Apr 14, 2024 7055    Comment   Lakhwinder Puckett discharge to home/self care.                   Follow-up Information    None         Discharge Medication List as of 4/14/2024  6:27 PM        START taking these medications    Details   !! albuterol (2.5 mg/3 mL) 0.083 % nebulizer solution Take 3 mL (2.5 mg total) by nebulization every 6 (six) hours as needed for wheezing or shortness of breath, Starting Sun 4/14/2024, Normal      predniSONE 20 mg tablet Take 3 tablets (60 mg total) by mouth daily, Starting Sun 4/14/2024, Normal       !! - Potential duplicate medications found. Please discuss with provider.        CONTINUE these medications which have NOT CHANGED    Details   !! albuterol (2.5 mg/3 mL) 0.083 % nebulizer solution Take 3 mL (2.5 mg total) by nebulization every 6 (six) hours as needed for wheezing or shortness of breath, Starting Mon 11/27/2023, Normal      albuterol (PROVENTIL HFA,VENTOLIN HFA) 90 mcg/act inhaler Inhale 2 puffs 4 (four) times a day, Starting Fri 4/5/2024, Normal      budesonide (PULMICORT) 0.5 mg/2 mL nebulizer solution Take 2 mL (0.5 mg total) by nebulization 2 (two) times a day Rinse mouth after use., Starting Fri 4/5/2024, Until Tue 6/4/2024, Normal      cetirizine (ZyrTEC) 10 mg tablet Take 1 tablet (10 mg total) by mouth daily, Starting Fri 4/5/2024, Normal      escitalopram (LEXAPRO) 10 mg tablet TAKE 1 TABLET BY MOUTH EVERY DAY, Starting Mon 1/29/2024, Normal      montelukast (SINGULAIR) 10 mg tablet TAKE 1 TABLET BY MOUTH DAILY AT BEDTIME, Starting Fri 1/19/2024, Normal      fluticasone (FLONASE) 50 mcg/act nasal spray 1 spray into each nostril daily, Starting Sat 6/4/2022, Historical Med      fluticasone-umeclidinium-vilanterol 200-62.5-25 mcg/actuation AEPB inhaler Inhale 1 puff daily Rinse mouth after use., Starting Wed 2/7/2024, Normal      fluticasone-vilanterol  200-25 mcg/actuation inhaler Inhale 1 puff daily Rinse mouth after use., Starting Wed 2/7/2024, Until Sat 2/1/2025, Normal      levalbuterol (XOPENEX HFA) 45 mcg/act inhaler INHALE 2 PUFFS EVERY 6 HOURS AS NEEDED FOR WHEEZING, Historical Med       !! - Potential duplicate medications found. Please discuss with provider.          No discharge procedures on file.    PDMP Review       None            ED Provider  Electronically Signed by             Michelet Grubbs MD  04/2004

## 2024-04-14 NOTE — DISCHARGE INSTRUCTIONS
Take the Prednisone, 3 pills daily for the next 5 days.  Use the albuterol as needed for shortness of breath and wheezing.  Return to the ER if your breathing worsens despite taking the medications.

## 2024-04-14 NOTE — Clinical Note
Lakhwinder Puckett was seen and treated in our emergency department on 4/14/2024.    No restrictions            Diagnosis:     Lakhwinder  may return to work on return date.    She may return on this date: 04/17/2024         If you have any questions or concerns, please don't hesitate to call.      Michelet Grubbs MD    ______________________________           _______________          _______________  Hospital Representative                              Date                                Time

## 2024-05-14 ENCOUNTER — TELEPHONE (OUTPATIENT)
Age: 19
End: 2024-05-14

## 2024-05-14 NOTE — TELEPHONE ENCOUNTER
Ioana called from [facility] in regards to wanting to know when was patients last physical.

## 2024-05-22 ENCOUNTER — OFFICE VISIT (OUTPATIENT)
Dept: FAMILY MEDICINE CLINIC | Facility: CLINIC | Age: 19
End: 2024-05-22

## 2024-05-22 VITALS
WEIGHT: 190 LBS | BODY MASS INDEX: 33.66 KG/M2 | DIASTOLIC BLOOD PRESSURE: 60 MMHG | OXYGEN SATURATION: 98 % | RESPIRATION RATE: 16 BRPM | HEIGHT: 63 IN | HEART RATE: 79 BPM | SYSTOLIC BLOOD PRESSURE: 108 MMHG | TEMPERATURE: 97.5 F

## 2024-05-22 DIAGNOSIS — J82.83 EOSINOPHILIC ASTHMA: ICD-10-CM

## 2024-05-22 DIAGNOSIS — J45.901 ASTHMA EXACERBATION: ICD-10-CM

## 2024-05-22 DIAGNOSIS — F41.9 ANXIETY: ICD-10-CM

## 2024-05-22 DIAGNOSIS — J45.50 SEVERE PERSISTENT ASTHMA WITHOUT COMPLICATION: Primary | ICD-10-CM

## 2024-05-22 DIAGNOSIS — Z23 ENCOUNTER FOR IMMUNIZATION: ICD-10-CM

## 2024-05-22 PROCEDURE — 90632 HEPA VACCINE ADULT IM: CPT

## 2024-05-22 PROCEDURE — 99213 OFFICE O/P EST LOW 20 MIN: CPT | Performed by: FAMILY MEDICINE

## 2024-05-22 PROCEDURE — 90471 IMMUNIZATION ADMIN: CPT

## 2024-05-22 RX ORDER — BUDESONIDE 0.5 MG/2ML
0.5 INHALANT ORAL 2 TIMES DAILY
Qty: 120 ML | Refills: 1 | Status: SHIPPED | OUTPATIENT
Start: 2024-05-22 | End: 2024-07-21

## 2024-05-22 RX ORDER — CETIRIZINE HYDROCHLORIDE 10 MG/1
10 TABLET ORAL DAILY
Qty: 90 TABLET | Refills: 1 | Status: SHIPPED | OUTPATIENT
Start: 2024-05-22

## 2024-05-22 RX ORDER — ALBUTEROL SULFATE 2.5 MG/3ML
2.5 SOLUTION RESPIRATORY (INHALATION) 2 TIMES DAILY
Qty: 270 ML | Refills: 1 | Status: SHIPPED | OUTPATIENT
Start: 2024-05-22 | End: 2024-08-20

## 2024-05-22 RX ORDER — ESCITALOPRAM OXALATE 5 MG/1
10 TABLET ORAL DAILY
Qty: 90 TABLET | Refills: 0 | Status: SHIPPED
Start: 2024-05-22

## 2024-05-22 RX ORDER — PREDNISONE 50 MG/1
50 TABLET ORAL DAILY
Qty: 4 TABLET | Refills: 0 | Status: SHIPPED | OUTPATIENT
Start: 2024-05-22

## 2024-05-22 NOTE — PROGRESS NOTES
Ambulatory Visit  Name: Lakhwinder Puckett      : 2005      MRN: [id number]  Encounter Provider: Elias Schoen, MD  Encounter Date: 2024   Encounter department: [facility]    Assessment & Plan   1. Severe persistent asthma without complication  Assessment & Plan:  Patient's symptoms have improved since starting maintenance therapy with nebulized budesonide.  Still had 1 severe attack in the interim since last seen.  This was likely brought on by allergies.  Due to patient's severity of symptoms and extensive allergies with think patient would benefit greatly from Dupixent.  Unclear at this point if it is cost prohibitive, will further evaluate this in conjunction with pulmonology.  - Continue taking budesonide nebulizer twice daily  - Continue albuterol nebulizer as needed, and every hour with an exacerbation  - Continue Singulair and restart taking Zyrtec daily to maintain upper airway improvement  - Will work on prior authorization for Dupixent  - Will consider Dimethylxanthine addition to patient's treatment regimen particularly if Dupixent is cost prohibitive  -Patient given pocket prescription of prednisone 50 mg to take daily if patient feels an asthma attack coming on.  Patient to follow-up with healthcare provider as soon as able with onset of symptoms and starting steroids on her own.  Patient expressed understanding of plan.  - Follow-up in 2 months or sooner if needed  Orders:  -     budesonide (PULMICORT) 0.5 mg/2 mL nebulizer solution; Take 2 mL (0.5 mg total) by nebulization 2 (two) times a day Rinse mouth after use.  -     predniSONE 50 mg tablet; Take 1 tablet (50 mg total) by mouth daily  -     cetirizine (ZyrTEC) 10 mg tablet; Take 1 tablet (10 mg total) by mouth daily  2. Eosinophilic asthma  -     predniSONE 50 mg tablet; Take 1 tablet (50 mg total) by mouth daily  -     cetirizine (ZyrTEC) 10 mg tablet; Take 1 tablet (10 mg total) by mouth daily  3.  Anxiety  Assessment & Plan:  Anxiety improved with improved asthma control.  - Decreasing escitalopram to 5 mg daily, will consider discontinuation of patient's symptoms continue to be well-controlled  Orders:  -     escitalopram (LEXAPRO) 5 mg tablet; Take 2 tablets (10 mg total) by mouth daily  4. Encounter for immunization  -     HEPATITIS A VACCINE ADULT IM  5. Asthma exacerbation  -     albuterol (2.5 mg/3 mL) 0.083 % nebulizer solution; Take 3 mL (2.5 mg total) by nebulization 2 (two) times a day      Depression Screening and Follow-up Plan: Patient declines further evaluation by mental health professional and/or medications. Brief counseling provided. Will re-evaluate at next office visit.         History of Present Illness     Patient is a 18-year-old female with severe persistent asthma here for follow-up    Patient has been unable to get controller inhalers due to the prohibitive cost.  She is now taking the budesonide nebulizer twice daily.  She states her symptoms have somewhat improved.  She did have 1 exacerbation while at work that required her to be taken to the emergency department.  EMS was at her work at the time of her attack.  They were able to care for her prior to her passing out, unlike previous times.  It was noted that she had very poor air movement at that time.  She states that this episode occurred around the time when she ran out of her Zyrtec prescription.  She is still taking the montelukast nightly.  She is also using albuterol nebulizer about once per day.  She uses her inhaler when she is at work or on about.    Patient states she is doing better from an anxiety perspective.  She is able to get through the day now at work without feeling panicky.  This may be due to her improved breathing she is not sure.      Review of Systems   Constitutional:  Negative for chills and fever.   HENT:  Negative for sore throat.    Respiratory:  Negative for shortness of breath.    Cardiovascular:   Negative for chest pain and palpitations.   Gastrointestinal:  Negative for abdominal pain.   Genitourinary:  Negative for dysuria.   Musculoskeletal:  Negative for myalgias.   Neurological:  Negative for weakness.     Past Medical History:   Diagnosis Date    Asthma     Leukocytosis     SIRS (systemic inflammatory response syndrome) (HCC)      No past surgical history on file.  Family History   Problem Relation Age of Onset    Thyroid disease Mother     No Known Problems Father      Social History     Tobacco Use    Smoking status: Never    Smokeless tobacco: Never   Vaping Use    Vaping status: Never Used   Substance and Sexual Activity    Alcohol use: Not Currently    Drug use: Not Currently    Sexual activity: Not on file     Current Outpatient Medications on File Prior to Visit   Medication Sig    albuterol (PROVENTIL HFA,VENTOLIN HFA) 90 mcg/act inhaler Inhale 2 puffs 4 (four) times a day    fluticasone (FLONASE) 50 mcg/act nasal spray 1 spray into each nostril daily    montelukast (SINGULAIR) 10 mg tablet TAKE 1 TABLET BY MOUTH DAILY AT BEDTIME     No Known Allergies  Immunization History   Administered Date(s) Administered    COVID-19 PFIZER VACCINE 0.3 ML IM 06/14/2021, 07/12/2021    DTaP 01/30/2006, 07/18/2008    DTaP / Hep B / IPV 2004, 2004, 2004    HPV9 09/23/2016, 08/06/2021    Hep A, adult 05/22/2024    Hep A, ped/adol, 2 dose 09/23/2016    Hep B, Adolescent or Pediatric 2004    Hepatitis A 09/23/2016    Hib (PRP-T) 2004, 2004, 01/10/2005    INFLUENZA 2004, 01/10/2005, 09/23/2016    IPV 07/18/2008    Influenza, injectable, quadrivalent, preservative free 0.5 mL 09/29/2021    MMR 01/30/2006, 07/18/2008    Meningococcal MCV4P 09/23/2016    Meningococcal Polysaccharide (MPSV4) 08/06/2021    Pneumococcal Conjugate PCV 7 2004, 2004    Pneumococcal Conjugate Vaccine 20-valent (Pcv20), Polysace 12/27/2023    Tdap 09/23/2016    Varicella 01/30/2006,  "07/18/2008    meningococcal ACYW-135 TT Conjugate 08/06/2021     Objective     /60 (BP Location: Left arm, Patient Position: Sitting, Cuff Size: Standard)   Pulse 79   Temp 97.5 °F (36.4 °C) (Temporal)   Resp 16   Ht 5' 3\" (1.6 m)   Wt 86.2 kg (190 lb)   LMP 05/20/2024   SpO2 98%   BMI 33.66 kg/m²     Physical Exam  Constitutional:       General: She is not in acute distress.     Appearance: Normal appearance.   Cardiovascular:      Rate and Rhythm: Normal rate and regular rhythm.   Pulmonary:      Effort: Pulmonary effort is normal. No respiratory distress.      Breath sounds: Normal breath sounds. No wheezing.   Musculoskeletal:         General: Normal range of motion.      Cervical back: Normal range of motion.   Neurological:      General: No focal deficit present.      Mental Status: She is alert and oriented to person, place, and time.       Administrative Statements         "

## 2024-05-26 NOTE — ASSESSMENT & PLAN NOTE
Anxiety improved with improved asthma control.  - Decreasing escitalopram to 5 mg daily, will consider discontinuation of patient's symptoms continue to be well-controlled

## 2024-05-26 NOTE — ASSESSMENT & PLAN NOTE
Patient's symptoms have improved since starting maintenance therapy with nebulized budesonide.  Still had 1 severe attack in the interim since last seen.  This was likely brought on by allergies.  Due to patient's severity of symptoms and extensive allergies with think patient would benefit greatly from Dupixent.  Unclear at this point if it is cost prohibitive, will further evaluate this in conjunction with pulmonology.  - Continue taking budesonide nebulizer twice daily  - Continue albuterol nebulizer as needed, and every hour with an exacerbation  - Continue Singulair and restart taking Zyrtec daily to maintain upper airway improvement  - Will work on prior authorization for Dupixent  - Will consider Dimethylxanthine addition to patient's treatment regimen particularly if Dupixent is cost prohibitive  -Patient given pocket prescription of prednisone 50 mg to take daily if patient feels an asthma attack coming on.  Patient to follow-up with healthcare provider as soon as able with onset of symptoms and starting steroids on her own.  Patient expressed understanding of plan.  - Follow-up in 2 months or sooner if needed

## 2024-06-14 DIAGNOSIS — J45.50 SEVERE PERSISTENT ASTHMA WITHOUT COMPLICATION: ICD-10-CM

## 2024-06-14 RX ORDER — BUDESONIDE 0.5 MG/2ML
INHALANT ORAL
Qty: 360 ML | Refills: 1 | Status: SHIPPED | OUTPATIENT
Start: 2024-06-14

## 2024-09-15 DIAGNOSIS — J45.50 SEVERE PERSISTENT ASTHMA WITHOUT COMPLICATION: ICD-10-CM

## 2024-09-16 RX ORDER — BUDESONIDE 0.5 MG/2ML
INHALANT ORAL
Qty: 360 ML | Refills: 1 | Status: SHIPPED | OUTPATIENT
Start: 2024-09-16

## 2024-10-04 ENCOUNTER — TELEPHONE (OUTPATIENT)
Dept: FAMILY MEDICINE CLINIC | Facility: CLINIC | Age: 19
End: 2024-10-04

## 2024-10-04 NOTE — TELEPHONE ENCOUNTER
Left message to inform patient appointment on 10/23 needs to be rescheduled. If patient calls back, please offer appointments on hold with Dr. Schoen. Thank you.

## 2024-10-09 NOTE — TELEPHONE ENCOUNTER
third attempt to contact patient. no answer left message to return my call on answering machine     Letter send

## 2024-10-30 ENCOUNTER — APPOINTMENT (EMERGENCY)
Dept: RADIOLOGY | Facility: HOSPITAL | Age: 19
End: 2024-10-30
Payer: COMMERCIAL

## 2024-10-30 ENCOUNTER — HOSPITAL ENCOUNTER (EMERGENCY)
Facility: HOSPITAL | Age: 19
Discharge: HOME | End: 2024-10-30
Attending: EMERGENCY MEDICINE
Payer: COMMERCIAL

## 2024-10-30 VITALS
OXYGEN SATURATION: 96 % | HEART RATE: 114 BPM | DIASTOLIC BLOOD PRESSURE: 58 MMHG | BODY MASS INDEX: 36.41 KG/M2 | HEIGHT: 63 IN | SYSTOLIC BLOOD PRESSURE: 117 MMHG | WEIGHT: 205.47 LBS | RESPIRATION RATE: 18 BRPM | TEMPERATURE: 98.4 F

## 2024-10-30 DIAGNOSIS — J45.901 ASTHMA EXACERBATION: Primary | ICD-10-CM

## 2024-10-30 DIAGNOSIS — J45.50 SEVERE PERSISTENT ASTHMA WITHOUT COMPLICATION: ICD-10-CM

## 2024-10-30 DIAGNOSIS — J45.40 MODERATE PERSISTENT ASTHMA WITHOUT COMPLICATION: ICD-10-CM

## 2024-10-30 PROCEDURE — 99285 EMERGENCY DEPT VISIT HI MDM: CPT

## 2024-10-30 PROCEDURE — 96365 THER/PROPH/DIAG IV INF INIT: CPT

## 2024-10-30 PROCEDURE — 71046 X-RAY EXAM CHEST 2 VIEWS: CPT

## 2024-10-30 PROCEDURE — 99284 EMERGENCY DEPT VISIT MOD MDM: CPT | Performed by: PHYSICIAN ASSISTANT

## 2024-10-30 PROCEDURE — 94640 AIRWAY INHALATION TREATMENT: CPT

## 2024-10-30 RX ORDER — MAGNESIUM SULFATE HEPTAHYDRATE 40 MG/ML
2 INJECTION, SOLUTION INTRAVENOUS ONCE
Status: COMPLETED | OUTPATIENT
Start: 2024-10-30 | End: 2024-10-30

## 2024-10-30 RX ORDER — ALBUTEROL SULFATE 0.83 MG/ML
2.5 SOLUTION RESPIRATORY (INHALATION) EVERY 6 HOURS PRN
Qty: 75 ML | Refills: 0 | Status: SHIPPED | OUTPATIENT
Start: 2024-10-30

## 2024-10-30 RX ORDER — LORAZEPAM 2 MG/ML
1 INJECTION INTRAMUSCULAR ONCE
Status: COMPLETED | OUTPATIENT
Start: 2024-10-30 | End: 2024-10-30

## 2024-10-30 RX ORDER — PREDNISONE 20 MG/1
60 TABLET ORAL DAILY
Qty: 15 TABLET | Refills: 0 | Status: SHIPPED | OUTPATIENT
Start: 2024-10-30 | End: 2024-11-04

## 2024-10-30 RX ORDER — IPRATROPIUM BROMIDE AND ALBUTEROL SULFATE .5; 3 MG/3ML; MG/3ML
1 SOLUTION RESPIRATORY (INHALATION) ONCE
Status: COMPLETED | OUTPATIENT
Start: 2024-10-30 | End: 2024-10-30

## 2024-10-30 RX ORDER — ALBUTEROL SULFATE 90 UG/1
2 INHALANT RESPIRATORY (INHALATION) 4 TIMES DAILY
Qty: 18 G | Refills: 0 | Status: SHIPPED | OUTPATIENT
Start: 2024-10-30

## 2024-10-30 RX ORDER — ALBUTEROL SULFATE 2.5 MG/3ML
1 SOLUTION RESPIRATORY (INHALATION) ONCE
Status: COMPLETED | OUTPATIENT
Start: 2024-10-30 | End: 2024-10-30

## 2024-10-30 RX ORDER — ALBUTEROL SULFATE 0.83 MG/ML
5 SOLUTION RESPIRATORY (INHALATION) ONCE
Status: COMPLETED | OUTPATIENT
Start: 2024-10-30 | End: 2024-10-30

## 2024-10-30 RX ORDER — METHYLPREDNISOLONE SOD SUCC 125 MG
1 VIAL (EA) INJECTION ONCE
Status: COMPLETED | OUTPATIENT
Start: 2024-10-30 | End: 2024-10-30

## 2024-10-30 RX ADMIN — MAGNESIUM SULFATE HEPTAHYDRATE 2 G: 40 INJECTION, SOLUTION INTRAVENOUS at 14:07

## 2024-10-30 RX ADMIN — ALBUTEROL SULFATE 5 MG: 2.5 SOLUTION RESPIRATORY (INHALATION) at 14:03

## 2024-10-30 RX ADMIN — IPRATROPIUM BROMIDE 0.5 MG: 0.5 SOLUTION RESPIRATORY (INHALATION) at 14:03

## 2024-10-30 NOTE — ED PROVIDER NOTES
Time reflects when diagnosis was documented in both MDM as applicable and the Disposition within this note       Time User Action Codes Description Comment    10/30/2024  3:29 PM Opal Hogue Add [J45.901] Asthma exacerbation     10/30/2024  3:49 PM Opal Hogue Add [J45.40] Moderate persistent asthma without complication     10/30/2024  3:49 PM Opal Hogue Add [J45.50] Severe persistent asthma without complication           ED Disposition       None          Assessment & Plan       Medical Decision Making  19y.o female presents to the ER for asthma exacerbation occurring 1 hour ago. Patient is tachycardic but also just had two nebulizer treatments. Will monitor. Otherwise vitals are stable. On exam, moist mucous membranes seen. No trouble swallowing or handling secretions. No neck swelling. Breathing is non-labored. No tachypnea or accessory muscle use. Mild wheezing heard. Heart is regular rhythm. Abdomen is not distended. DDX consists of but not limited to: viral syndrome, pneumonia, asthma. Will check CXR and give another duoneb with magnesium.    1530 - Patient reports feeling much better. Her pulse ox is 95% at rest and 93% when ambulating. Will observe to ensure patient does not decompensate.     1545 - Patient signed out to Azalea Deleon PA-C for observation. Patient stable.    Problems Addressed:  Asthma exacerbation: acute illness or injury    Amount and/or Complexity of Data Reviewed  Independent Historian:      Details: Patient is historian  Radiology: ordered and independent interpretation performed.    Risk  Prescription drug management.           Medications   albuterol (FOR EMS ONLY) (2.5 mg/3 mL) 0.083 % inhalation solution 2.5 mg (0 mg Does not apply Given to EMS 10/30/24 1306)   ipratropium-albuterol (FOR EMS ONLY) (DUO-NEB) 0.5-2.5 mg/3 mL inhalation solution 3 mL (0 mL Does not apply Given to EMS 10/30/24 1306)   methylPREDNISolone sodium succinate (FOR EMS ONLY) (Solu-MEDROL) 125 MG  "injection 125 mg (0 mg Does not apply Given to EMS 10/30/24 1306)   LORazepam (FOR EMS ONLY) (ATIVAN) 2 mg/mL injection 2 mg (0 mg Does not apply Given to EMS 10/30/24 1306)   albuterol inhalation solution 5 mg (5 mg Nebulization Given 10/30/24 1403)   ipratropium (ATROVENT) 0.02 % inhalation solution 0.5 mg (0.5 mg Nebulization Given 10/30/24 1403)   magnesium sulfate 2 g/50 mL IVPB (premix) 2 g (0 g Intravenous Stopped 10/30/24 1508)       ED Risk Strat Scores             CRAFFT      Flowsheet Row Most Recent Value   CRAFFT Initial Screen: During the past 12 months, did you:    1. Drink any alcohol (more than a few sips)?  No Filed at: 10/30/2024 1310   2. Smoke any marijuana or hashish No Filed at: 10/30/2024 1310   3. Use anything else to get high? (\"anything else\" includes illegal drugs, over the counter and prescription drugs, and things that you sniff or 'patiño')? No Filed at: 10/30/2024 1310                                          History of Present Illness       Chief Complaint   Patient presents with   • Asthma     Patient reports asthma attack started around 1 hr pta, reports attempted to utilize inhaler and then nebulizer with no improvement. Patient then experience panic attack. Per EMS patient was throwing things upon their arrival, patient given 2mg IM ativan, 1 duo-neb, 1 albuterol tx, and 125mg IV solumedrol. Patient reports improvement after medications.        Past Medical History:   Diagnosis Date   • Asthma    • Leukocytosis    • SIRS (systemic inflammatory response syndrome) (HCC)       History reviewed. No pertinent surgical history.   Family History   Problem Relation Age of Onset   • Thyroid disease Mother    • No Known Problems Father       Social History     Tobacco Use   • Smoking status: Never   • Smokeless tobacco: Never   Vaping Use   • Vaping status: Never Used   Substance Use Topics   • Alcohol use: Not Currently   • Drug use: Not Currently      E-Cigarette/Vaping   • E-Cigarette Use " Never User       E-Cigarette/Vaping Substances   • Nicotine No    • THC No    • CBD No    • Flavoring No    • Other No    • Unknown No       I have reviewed and agree with the history as documented.     19y.o female with PMH of asthma presents to the ER for asthma exacerbation occurring 1 hour ago. Patient tried using her inhaler and nebulizer without relief. She then began to have a panic attack. When EMS arrived, the patient was combative until she was given 2mg of Ativan IM. Once she calmed down, she was given one duoneb, one albuterol treatment and 125mg of Solumedrol IV. Patient reports symptoms have since improved. She reports rhinorrhea/congestion but states this is due to her allergies. She also reports diarrhea. She denies fever, chills, chest pain, dyspnea currently, nausea, vomiting, abdominal pain, weakness or paresthesias.      History provided by:  Patient   used: No        Review of Systems   Constitutional:  Negative for activity change, appetite change, chills and fever.   HENT:  Positive for congestion and rhinorrhea. Negative for drooling, ear discharge, ear pain, facial swelling and sore throat.    Eyes:  Negative for redness.   Respiratory:  Positive for chest tightness (resolved). Negative for cough and shortness of breath (resolved).    Cardiovascular:  Negative for chest pain.   Gastrointestinal:  Negative for abdominal pain, diarrhea, nausea and vomiting.   Musculoskeletal:  Negative for neck stiffness.   Skin:  Negative for rash.   Allergic/Immunologic: Negative for food allergies.   Neurological:  Negative for weakness and numbness.           Objective       ED Triage Vitals [10/30/24 1304]   Temperature Pulse Blood Pressure Respirations SpO2 Patient Position - Orthostatic VS   98.4 °F (36.9 °C) (!) 124 137/60 20 97 % Sitting      Temp Source Heart Rate Source BP Location FiO2 (%) Pain Score    Oral Monitor Left arm -- --      Vitals      Date and Time Temp Pulse SpO2  Resp BP Pain Score FACES Pain Rating User   10/30/24 1534 -- -- 95 % -- -- -- -- SL   10/30/24 1533 -- -- 93 % -- -- -- -- SL   10/30/24 1517 -- 115 97 % -- 117/58 -- -- BM   10/30/24 1304 98.4 °F (36.9 °C) 124 97 % 20 137/60 -- -- SG            Physical Exam  Vitals and nursing note reviewed.   Constitutional:       General: She is not in acute distress.     Appearance: She is not toxic-appearing.   HENT:      Head: Normocephalic and atraumatic.      Mouth/Throat:      Lips: Pink. No lesions.      Mouth: Mucous membranes are moist.   Eyes:      Conjunctiva/sclera: Conjunctivae normal.   Neck:      Trachea: No tracheal deviation.   Cardiovascular:      Rate and Rhythm: Normal rate and regular rhythm.      Heart sounds: Normal heart sounds, S1 normal and S2 normal. No murmur heard.     No friction rub. No gallop.   Pulmonary:      Effort: Pulmonary effort is normal. No respiratory distress.      Breath sounds: Wheezing (mild) present. No decreased breath sounds, rhonchi or rales.   Chest:      Chest wall: No tenderness.   Abdominal:      General: There is no distension.   Musculoskeletal:      Cervical back: Normal range of motion and neck supple.   Skin:     General: Skin is warm and dry.      Findings: No rash.   Neurological:      Mental Status: She is alert.      GCS: GCS eye subscore is 4. GCS verbal subscore is 5. GCS motor subscore is 6.   Psychiatric:         Mood and Affect: Mood normal.       Results Reviewed       None            XR chest 2 views   ED Interpretation by Opal Hogue PA-C (10/30 1417)   No acute abnormalities seen by me at this time.      Final Interpretation by Eder Gaston MD (10/30 1444)      No acute cardiopulmonary disease.            Resident: JOY Temple I, the attending radiologist, have reviewed the images and agree with the final report above.      Workstation performed: [id number]             Procedures    ED Medication and Procedure Management   Prior to  Admission Medications   Prescriptions Last Dose Informant Patient Reported? Taking?   albuterol (PROVENTIL HFA,VENTOLIN HFA) 90 mcg/act inhaler   No No   Sig: Inhale 2 puffs 4 (four) times a day   albuterol (PROVENTIL HFA,VENTOLIN HFA) 90 mcg/act inhaler   No Yes   Sig: Inhale 2 puffs 4 (four) times a day   budesonide (PULMICORT) 0.5 mg/2 mL nebulizer solution   No No   Sig: TAKE 2 ML (0.5 MG TOTAL) BY NEBULIZATION TWICE A DAY RINSE MOUTH AFTER USE   cetirizine (ZyrTEC) 10 mg tablet   No No   Sig: Take 1 tablet (10 mg total) by mouth daily   escitalopram (LEXAPRO) 5 mg tablet   No No   Sig: Take 2 tablets (10 mg total) by mouth daily   fluticasone (FLONASE) 50 mcg/act nasal spray   Yes No   Si spray into each nostril daily   montelukast (SINGULAIR) 10 mg tablet   No No   Sig: TAKE 1 TABLET BY MOUTH DAILY AT BEDTIME   predniSONE 50 mg tablet   No No   Sig: Take 1 tablet (50 mg total) by mouth daily      Facility-Administered Medications: None     Patient's Medications   Discharge Prescriptions    ALBUTEROL (2.5 MG/3 ML) 0.083 % NEBULIZER SOLUTION    Take 3 mL (2.5 mg total) by nebulization every 6 (six) hours as needed for wheezing or shortness of breath       Start Date: 10/30/2024End Date: --       Order Dose: 2.5 mg       Quantity: 75 mL    Refills: 0    PREDNISONE 20 MG TABLET    Take 3 tablets (60 mg total) by mouth daily for 5 days       Start Date: 10/30/2024End Date: 2024       Order Dose: 60 mg       Quantity: 15 tablet    Refills: 0     No discharge procedures on file.  ED SEPSIS DOCUMENTATION   Time reflects when diagnosis was documented in both MDM as applicable and the Disposition within this note       Time User Action Codes Description Comment    10/30/2024  3:29 PM Opal Hogue Add [J45.901] Asthma exacerbation     10/30/2024  3:49 PM Opal Hogue Add [J45.40] Moderate persistent asthma without complication     10/30/2024  3:49 PM Opal Hogue Add [J45.50] Severe persistent  asthma without complication                  Opal Hogue PA-C  10/30/24 9869

## 2024-10-30 NOTE — ED CARE HANDOFF
"Emergency Department Sign Out Note        Sign out and transfer of care from Opal Hogue PA-C. See Separate Emergency Department note.     The patient, Lakhwinder Puckett, was evaluated by the previous provider for asthma exacerbation.    Workup Completed:  Labs Reviewed - No data to display      ED Course / Workup Pending (followup):  Patient arrived via EMS with asthma exacerbation, initially aggressive. History of severe asthma previously requiring epi.   -Reassess in 1 hour to determine ability to return home. If difficulty breathing returns redose medications and observe.                                      Procedures  Medical Decision Making  Reassessed patient. She feels improved. 96% on room air. No significant wheezing noted on exam.  Discussed using albuterol, prednisone.   Stressed importance of outpatient follow-up.    Discussed findings from the visit with the patient.  We had a conversation regarding supportive care and indications for return.  Recommended appropriate follow-up.  Patient and/or family understand and agree with plan.    Portions of the record may have been created with voice recognition software. Occasional use of the incorrect word or \"sound a like\" substitutions may have occurred due to the inherent limitations of voice recognition software. Read the chart carefully and recognize, using context, where substitutions have occurred.         Amount and/or Complexity of Data Reviewed  Radiology: ordered and independent interpretation performed.    Risk  Prescription drug management.            Disposition  Final diagnoses:   Asthma exacerbation     Time reflects when diagnosis was documented in both MDM as applicable and the Disposition within this note       Time User Action Codes Description Comment    10/30/2024  3:29 PM Opal Hogue Add [J45.901] Asthma exacerbation     10/30/2024  3:49 PM Opal Hogue Add [J45.40] Moderate persistent asthma without complication     10/30/2024 "  3:49 PM Opal Hogue Add [J45.50] Severe persistent asthma without complication           ED Disposition       ED Disposition   Discharge    Condition   Stable    Date/Time   Wed Oct 30, 2024  5:11 PM    Comment   Lakhwinder Puckett discharge to home/self care.                   Follow-up Information       Follow up With Specialties Details Why Contact Info Additional Information    [facility] Schedule an appointment as soon as possible for a visit  As needed [address] [place] [address], [address] [address] [phone] [facility], [address] [place] [address], [address], [address] [phone]    [facility] Schedule an appointment as soon as possible for a visit in 1 day  [address] [place] [id number] [phone] [facility], [address] [place] [address], [address] [phone]          Discharge Medication List as of 10/30/2024  5:12 PM        START taking these medications    Details   albuterol (2.5 mg/3 mL) 0.083 % nebulizer solution Take 3 mL (2.5 mg total) by nebulization every 6 (six) hours as needed for wheezing or shortness of breath, Starting Wed 10/30/2024, Normal      !! predniSONE 20 mg tablet Take 3 tablets (60 mg total) by mouth daily for 5 days, Starting Wed 10/30/2024, Until Mon 11/4/2024, Normal       !! - Potential duplicate medications found. Please discuss with provider.        CONTINUE these medications which have CHANGED    Details   albuterol (PROVENTIL HFA,VENTOLIN HFA) 90 mcg/act inhaler Inhale 2 puffs 4 (four) times a day, Starting Wed 10/30/2024, Normal           CONTINUE these medications which have NOT CHANGED    Details   budesonide (PULMICORT) 0.5 mg/2 mL nebulizer solution TAKE 2 ML (0.5 MG TOTAL) BY NEBULIZATION TWICE A DAY RINSE MOUTH AFTER  USE, Normal      cetirizine (ZyrTEC) 10 mg tablet Take 1 tablet (10 mg total) by mouth daily, Starting Wed 5/22/2024, Normal      escitalopram (LEXAPRO) 5 mg tablet Take 2 tablets (10 mg total) by mouth daily, Starting Wed 5/22/2024, No Print      fluticasone (FLONASE) 50 mcg/act nasal spray 1 spray into each nostril daily, Starting Sat 6/4/2022, Historical Med      montelukast (SINGULAIR) 10 mg tablet TAKE 1 TABLET BY MOUTH DAILY AT BEDTIME, Starting Fri 1/19/2024, Normal      !! predniSONE 50 mg tablet Take 1 tablet (50 mg total) by mouth daily, Starting Wed 5/22/2024, Normal       !! - Potential duplicate medications found. Please discuss with provider.        No discharge procedures on file.       ED Provider  Electronically Signed by     Junie Deleon PA-C  10/31/24 0059

## 2024-10-30 NOTE — DISCHARGE INSTRUCTIONS
DISCHARGE INSTRUCTIONS:    FOLLOW UP WITH YOUR PRIMARY CARE PROVIDER OR THE [facility]. MAKE AN APPOINTMENT TO BE SEEN.     TAKE MEDICATION AS PRESCRIBED. IF RASH, SHORTNESS OF BREATH OR TROUBLE SWALLOWING, STOP TAKING THE MEDICATION AND BE SEEN.     REST.    FOLLOW UP WITH PULMONOLOGIST    IF SYMPTOMS WORSEN OR NEW SYMPTOMS ARISE, RETURN TO THE ER TO BE SEEN.

## 2024-10-30 NOTE — Clinical Note
Lakhwinder Puckett was seen and treated in our emergency department on 10/30/2024.                Diagnosis: Medical condition    Lakhwinder  may return to work on return date.    She may return on this date: 11/01/2024         If you have any questions or concerns, please don't hesitate to call.      Junie Deleon PA-C    ______________________________           _______________          _______________  Hospital Representative                              Date                                Time

## 2024-10-30 NOTE — Clinical Note
Lakhwinder Puckett was seen and treated in our emergency department on 10/30/2024.                Diagnosis: Medical condition    Lakhwinder  may return to work on return date.    She may return on this date: 11/02/2024         If you have any questions or concerns, please don't hesitate to call.      Junie Deleon PA-C    ______________________________           _______________          _______________  Hospital Representative                              Date                                Time

## 2024-10-31 ENCOUNTER — OFFICE VISIT (OUTPATIENT)
Dept: FAMILY MEDICINE CLINIC | Facility: CLINIC | Age: 19
End: 2024-10-31

## 2024-10-31 ENCOUNTER — TELEPHONE (OUTPATIENT)
Dept: FAMILY MEDICINE CLINIC | Facility: CLINIC | Age: 19
End: 2024-10-31

## 2024-10-31 ENCOUNTER — HOSPITAL ENCOUNTER (EMERGENCY)
Facility: HOSPITAL | Age: 19
Discharge: HOME | End: 2024-10-31
Attending: EMERGENCY MEDICINE
Payer: COMMERCIAL

## 2024-10-31 VITALS
OXYGEN SATURATION: 94 % | BODY MASS INDEX: 36.44 KG/M2 | DIASTOLIC BLOOD PRESSURE: 56 MMHG | TEMPERATURE: 98.3 F | WEIGHT: 205.69 LBS | SYSTOLIC BLOOD PRESSURE: 106 MMHG | HEART RATE: 115 BPM | RESPIRATION RATE: 22 BRPM

## 2024-10-31 VITALS
HEART RATE: 113 BPM | OXYGEN SATURATION: 96 % | BODY MASS INDEX: 36.14 KG/M2 | SYSTOLIC BLOOD PRESSURE: 110 MMHG | HEIGHT: 63 IN | WEIGHT: 204 LBS | DIASTOLIC BLOOD PRESSURE: 60 MMHG | RESPIRATION RATE: 16 BRPM | TEMPERATURE: 98 F

## 2024-10-31 DIAGNOSIS — J45.901 ASTHMA EXACERBATION: Primary | ICD-10-CM

## 2024-10-31 DIAGNOSIS — F41.9 ANXIETY: ICD-10-CM

## 2024-10-31 DIAGNOSIS — J45.50 SEVERE PERSISTENT ASTHMA WITHOUT COMPLICATION: Primary | ICD-10-CM

## 2024-10-31 DIAGNOSIS — Z23 ENCOUNTER FOR IMMUNIZATION: ICD-10-CM

## 2024-10-31 DIAGNOSIS — J45.909 ASTHMA DUE TO ENVIRONMENTAL ALLERGIES: ICD-10-CM

## 2024-10-31 DIAGNOSIS — J30.1 SEASONAL ALLERGIC RHINITIS DUE TO POLLEN: ICD-10-CM

## 2024-10-31 DIAGNOSIS — J82.83 EOSINOPHILIC ASTHMA: ICD-10-CM

## 2024-10-31 LAB
ALBUMIN SERPL BCG-MCNC: 4.2 G/DL (ref 3.5–5)
ALP SERPL-CCNC: 70 U/L (ref 34–104)
ALT SERPL W P-5'-P-CCNC: 10 U/L (ref 7–52)
ANION GAP SERPL CALCULATED.3IONS-SCNC: 7 MMOL/L (ref 4–13)
AST SERPL W P-5'-P-CCNC: 24 U/L (ref 13–39)
BASOPHILS # BLD AUTO: 0.01 THOUSANDS/ΜL (ref 0–0.1)
BASOPHILS NFR BLD AUTO: 0 % (ref 0–1)
BILIRUB SERPL-MCNC: 0.48 MG/DL (ref 0.2–1)
BUN SERPL-MCNC: 11 MG/DL (ref 5–25)
CALCIUM SERPL-MCNC: 9.2 MG/DL (ref 8.4–10.2)
CHLORIDE SERPL-SCNC: 108 MMOL/L (ref 96–108)
CO2 SERPL-SCNC: 22 MMOL/L (ref 21–32)
CREAT SERPL-MCNC: 0.76 MG/DL (ref 0.6–1.3)
EOSINOPHIL # BLD AUTO: 0 THOUSAND/ΜL (ref 0–0.61)
EOSINOPHIL NFR BLD AUTO: 0 % (ref 0–6)
ERYTHROCYTE [DISTWIDTH] IN BLOOD BY AUTOMATED COUNT: 16.2 % (ref 11.6–15.1)
GFR SERPL CREATININE-BSD FRML MDRD: 113 ML/MIN/1.73SQ M
GLUCOSE SERPL-MCNC: 153 MG/DL (ref 65–140)
HCT VFR BLD AUTO: 37.7 % (ref 34.8–46.1)
HGB BLD-MCNC: 11.9 G/DL (ref 11.5–15.4)
IMM GRANULOCYTES # BLD AUTO: 0.15 THOUSAND/UL (ref 0–0.2)
IMM GRANULOCYTES NFR BLD AUTO: 1 % (ref 0–2)
LYMPHOCYTES # BLD AUTO: 1.6 THOUSANDS/ΜL (ref 0.6–4.47)
LYMPHOCYTES NFR BLD AUTO: 8 % (ref 14–44)
MAGNESIUM SERPL-MCNC: 2.2 MG/DL (ref 1.9–2.7)
MCH RBC QN AUTO: 27.4 PG (ref 26.8–34.3)
MCHC RBC AUTO-ENTMCNC: 31.6 G/DL (ref 31.4–37.4)
MCV RBC AUTO: 87 FL (ref 82–98)
MONOCYTES # BLD AUTO: 0.57 THOUSAND/ΜL (ref 0.17–1.22)
MONOCYTES NFR BLD AUTO: 3 % (ref 4–12)
NEUTROPHILS # BLD AUTO: 18.47 THOUSANDS/ΜL (ref 1.85–7.62)
NEUTS SEG NFR BLD AUTO: 88 % (ref 43–75)
NRBC BLD AUTO-RTO: 0 /100 WBCS
PLATELET # BLD AUTO: 359 THOUSANDS/UL (ref 149–390)
PMV BLD AUTO: 11.3 FL (ref 8.9–12.7)
POTASSIUM SERPL-SCNC: 4.1 MMOL/L (ref 3.5–5.3)
PROT SERPL-MCNC: 7.8 G/DL (ref 6.4–8.4)
RBC # BLD AUTO: 4.34 MILLION/UL (ref 3.81–5.12)
SODIUM SERPL-SCNC: 137 MMOL/L (ref 135–147)
WBC # BLD AUTO: 20.8 THOUSAND/UL (ref 4.31–10.16)

## 2024-10-31 PROCEDURE — 36415 COLL VENOUS BLD VENIPUNCTURE: CPT | Performed by: PHYSICIAN ASSISTANT

## 2024-10-31 PROCEDURE — 80053 COMPREHEN METABOLIC PANEL: CPT | Performed by: PHYSICIAN ASSISTANT

## 2024-10-31 PROCEDURE — 96374 THER/PROPH/DIAG INJ IV PUSH: CPT

## 2024-10-31 PROCEDURE — 85025 COMPLETE CBC W/AUTO DIFF WBC: CPT | Performed by: PHYSICIAN ASSISTANT

## 2024-10-31 PROCEDURE — 99291 CRITICAL CARE FIRST HOUR: CPT | Performed by: EMERGENCY MEDICINE

## 2024-10-31 PROCEDURE — 94640 AIRWAY INHALATION TREATMENT: CPT

## 2024-10-31 PROCEDURE — 83735 ASSAY OF MAGNESIUM: CPT | Performed by: PHYSICIAN ASSISTANT

## 2024-10-31 PROCEDURE — 94644 CONT INHLJ TX 1ST HOUR: CPT

## 2024-10-31 PROCEDURE — 99285 EMERGENCY DEPT VISIT HI MDM: CPT

## 2024-10-31 RX ORDER — ALBUTEROL SULFATE 0.83 MG/ML
5 SOLUTION RESPIRATORY (INHALATION) ONCE
Status: COMPLETED | OUTPATIENT
Start: 2024-10-31 | End: 2024-10-31

## 2024-10-31 RX ORDER — IPRATROPIUM BROMIDE AND ALBUTEROL SULFATE 2.5; .5 MG/3ML; MG/3ML
3 SOLUTION RESPIRATORY (INHALATION) 4 TIMES DAILY
Qty: 90 ML | Refills: 1 | Status: SHIPPED | OUTPATIENT
Start: 2024-10-31

## 2024-10-31 RX ORDER — METHYLPREDNISOLONE SODIUM SUCCINATE 125 MG/2ML
80 INJECTION, POWDER, LYOPHILIZED, FOR SOLUTION INTRAMUSCULAR; INTRAVENOUS ONCE
Status: COMPLETED | OUTPATIENT
Start: 2024-10-31 | End: 2024-10-31

## 2024-10-31 RX ORDER — ALBUTEROL SULFATE 5 MG/ML
10 SOLUTION RESPIRATORY (INHALATION) ONCE
Status: COMPLETED | OUTPATIENT
Start: 2024-10-31 | End: 2024-10-31

## 2024-10-31 RX ORDER — SODIUM CHLORIDE FOR INHALATION 0.9 %
12 VIAL, NEBULIZER (ML) INHALATION ONCE
Status: COMPLETED | OUTPATIENT
Start: 2024-10-31 | End: 2024-10-31

## 2024-10-31 RX ORDER — ALBUTEROL SULFATE 1.25 MG/3ML
1.25 SOLUTION RESPIRATORY (INHALATION) EVERY 6 HOURS PRN
Status: DISCONTINUED | OUTPATIENT
Start: 2024-10-31 | End: 2024-10-31

## 2024-10-31 RX ORDER — IPRATROPIUM BROMIDE AND ALBUTEROL SULFATE 2.5; .5 MG/3ML; MG/3ML
3 SOLUTION RESPIRATORY (INHALATION)
Status: DISCONTINUED | OUTPATIENT
Start: 2024-10-31 | End: 2024-10-31

## 2024-10-31 RX ORDER — ESCITALOPRAM OXALATE 10 MG/1
10 TABLET ORAL DAILY
Qty: 90 TABLET | Refills: 1 | Status: SHIPPED | OUTPATIENT
Start: 2024-10-31

## 2024-10-31 RX ORDER — IPRATROPIUM BROMIDE AND ALBUTEROL SULFATE 2.5; .5 MG/3ML; MG/3ML
3 SOLUTION RESPIRATORY (INHALATION) ONCE
Status: COMPLETED | OUTPATIENT
Start: 2024-10-31 | End: 2024-10-31

## 2024-10-31 RX ADMIN — ISODIUM CHLORIDE 12 ML: 0.03 SOLUTION RESPIRATORY (INHALATION) at 07:16

## 2024-10-31 RX ADMIN — IPRATROPIUM BROMIDE AND ALBUTEROL SULFATE 3 ML: 2.5; .5 SOLUTION RESPIRATORY (INHALATION) at 17:30

## 2024-10-31 RX ADMIN — IPRATROPIUM BROMIDE 0.5 MG: 0.5 SOLUTION RESPIRATORY (INHALATION) at 06:36

## 2024-10-31 RX ADMIN — ALBUTEROL SULFATE 5 MG: 2.5 SOLUTION RESPIRATORY (INHALATION) at 06:36

## 2024-10-31 RX ADMIN — METHYLPREDNISOLONE SODIUM SUCCINATE 80 MG: 125 INJECTION, POWDER, FOR SOLUTION INTRAMUSCULAR; INTRAVENOUS at 07:37

## 2024-10-31 RX ADMIN — IPRATROPIUM BROMIDE 1 MG: 0.5 SOLUTION RESPIRATORY (INHALATION) at 07:16

## 2024-10-31 RX ADMIN — ALBUTEROL SULFATE 10 MG: 2.5 SOLUTION RESPIRATORY (INHALATION) at 07:16

## 2024-10-31 NOTE — DISCHARGE INSTRUCTIONS
Take your steroids as directed and nebulizer/inhalers ever 4 -6 hrs   Follow up with your doctor. Return to the ED for worsening symptoms

## 2024-10-31 NOTE — ASSESSMENT & PLAN NOTE
Patient is having another severe asthma exacerbation despite excellent adherence to current medication therapy.  -Current therapy includes: Budesonide nebulizer twice daily, albuterol nebulizer at least once times daily, Zyrtec to control allergies, Flonase to control upper airway symptoms  - Despite significant inhaled corticosteroid treatment she is not achieving adequate control of her asthma symptoms and having severe exacerbations  - On recent pulmonology consultation discussed initiating Dupixent if inadequate control on current therapy  - Prior authorization completed after visit with the help of Yonas Nolen clinical pharmacist in order to expedite patient receiving Dupixent  - Patient aware of dosing, 600 mg x 1, 300 mg every 2 weeks indefinitely  - Plan to follow-up upon initiation of therapy   - Adding DuoNeb prescription instead of albuterol as she reports increased symptom relief with dual therapy    Orders:    dupilumab (DUPIXENT) subcutaneous injection; Inject 4 mL (600 mg total) under the skin once for 1 dose    dupilumab (DUPIXENT) subcutaneous injection; Inject 2 mL (300 mg total) under the skin every 14 (fourteen) days for 4 days, THEN 4 mL (600 mg total) every 14 (fourteen) days for 1 day.    ipratropium-albuterol (DUO-NEB) 0.5-2.5 mg/3 mL nebulizer solution; Take 3 mL by nebulization 4 (four) times a day    ipratropium-albuterol (DUO-NEB) 0.5-2.5 mg/3 mL inhalation solution 3 mL

## 2024-10-31 NOTE — ASSESSMENT & PLAN NOTE
Anxiety well-controlled on Lexapro 10 mg, she did not decrease to the 5 mg daily dosing  Orders:    escitalopram (LEXAPRO) 10 mg tablet; Take 1 tablet (10 mg total) by mouth daily

## 2024-10-31 NOTE — ED PROVIDER NOTES
Time reflects when diagnosis was documented in both MDM as applicable and the Disposition within this note       Time User Action Codes Description Comment    10/31/2024 10:58 AM Lucy Stevens Add [J45.901] Asthma exacerbation           ED Disposition       ED Disposition   Discharge    Condition   Stable    Date/Time   Thu Oct 31, 2024  9:23 AM    Comment   Lakhwinder Puckett discharge to home/self care.                   Assessment & Plan       Medical Decision Making  + asthma - here yesterday - will give neb and monitor, xray done yesterday, no fevers- nothing suggesting pneumonia - will not repeat today  Increased wheezing - will give heart neb  Will give IV steriods  Pt feeling much better- ambulated well- discussed obs admission vs dc home - pt feeling well - wants to go home    Amount and/or Complexity of Data Reviewed  Independent Historian: parent  External Data Reviewed: radiology and notes.  Labs: ordered. Decision-making details documented in ED Course.    Risk  Prescription drug management.        ED Course as of 10/31/24 1058   Thu Oct 31, 2024   0703 Incareased wheezing  - while on neb will give heart neb and iv solumedrol -   Discusssed possible admission failing out pt -    0752 On heart neb - improving breath sounds    0753 WBC(!): 20.80  From prednisone - no source of infection - no lactate needed, xray shows no pneumonia - yesterday   0836 MAGNESIUM: 2.2  Normal    0836 GLUCOSE(!): 153  Not fasting   0836 Sodium: 137  Normal electrolytes    0837 ANION GAP: 7  No gap    0847 Lungs clear after neb - will get amubulatory challenge - discussed with pt and mother and nursing staff    0924 Did well ambulating - discussed option of admission vs dc- pt feels well to go home       Medications   albuterol inhalation solution 5 mg (5 mg Nebulization Given 10/31/24 0636)   ipratropium (ATROVENT) 0.02 % inhalation solution 0.5 mg (0.5 mg Nebulization Given 10/31/24 0636)   methylPREDNISolone sodium succinate  "(Solu-MEDROL) injection 80 mg (80 mg Intravenous Given 10/31/24 0737)   albuterol inhalation solution 10 mg (10 mg Nebulization Given 10/31/24 0716)   ipratropium (ATROVENT) 0.02 % inhalation solution 1 mg (1 mg Nebulization Given 10/31/24 0716)   sodium chloride 0.9 % inhalation solution 12 mL (12 mL Nebulization Given 10/31/24 0716)       ED Risk Strat Scores             CRAFFT      Flowsheet Row Most Recent Value   CRAFFT Initial Screen: During the past 12 months, did you:    1. Drink any alcohol (more than a few sips)?  No Filed at: 10/31/2024 0639   2. Smoke any marijuana or hashish No Filed at: 10/31/2024 0639   3. Use anything else to get high? (\"anything else\" includes illegal drugs, over the counter and prescription drugs, and things that you sniff or 'patiño')? No Filed at: 10/31/2024 0639                                          History of Present Illness       Chief Complaint   Patient presents with    Asthma     Pt w a hx of astham. States that she began wheezing very heavily and coughing around 0200. Took prednisone and albuterol PTA w no relief. Pt audibly wheezing and tachypnic in triage.        Past Medical History:   Diagnosis Date    Asthma     Leukocytosis     SIRS (systemic inflammatory response syndrome) (HCC)       History reviewed. No pertinent surgical history.   Family History   Problem Relation Age of Onset    Thyroid disease Mother     No Known Problems Father       Social History     Tobacco Use    Smoking status: Never    Smokeless tobacco: Never   Vaping Use    Vaping status: Never Used   Substance Use Topics    Alcohol use: Not Currently    Drug use: Not Currently      E-Cigarette/Vaping    E-Cigarette Use Never User       E-Cigarette/Vaping Substances    Nicotine No     THC No     CBD No     Flavoring No     Other No     Unknown No       I have reviewed and agree with the history as documented.     Woke up at 2 a- wheezing-a sthma fliar. Tried to take her inhaller and steriod - no " relief   Here yesterday for same. Was feeling better and then woke up with symptoms again - couldn't get symptoms to improve at home so she came into the ED for eval.   No fevers.   Feels the meds make her heart race.         Review of Systems   Constitutional:  Negative for fever.   Respiratory:  Positive for cough, shortness of breath and wheezing.    Cardiovascular: Negative.    Gastrointestinal: Negative.    Genitourinary: Negative.    All other systems reviewed and are negative.          Objective       ED Triage Vitals   Temperature Pulse Blood Pressure Respirations SpO2 Patient Position - Orthostatic VS   10/31/24 0608 10/31/24 0608 10/31/24 0610 10/31/24 0608 10/31/24 0608 10/31/24 0608   98.3 °F (36.8 °C) (!) 115 106/56 22 98 % Sitting      Temp Source Heart Rate Source BP Location FiO2 (%) Pain Score    10/31/24 0608 10/31/24 0608 10/31/24 0608 -- --    Oral Monitor Right arm        Vitals      Date and Time Temp Pulse SpO2 Resp BP Pain Score FACES Pain Rating User   10/31/24 0852 -- -- 94 % -- -- -- -- SB   10/31/24 0846 -- 115 96 % -- -- -- -- AH   10/31/24 0610 -- -- -- -- 106/56 -- -- AS   10/31/24 0608 98.3 °F (36.8 °C) 115 98 % 22 -- -- -- AS            Physical Exam  Vitals and nursing note reviewed.   Constitutional:       Appearance: She is well-developed.   HENT:      Head: Normocephalic and atraumatic.      Right Ear: Tympanic membrane and external ear normal.      Left Ear: Tympanic membrane and external ear normal.   Eyes:      Conjunctiva/sclera: Conjunctivae normal.   Cardiovascular:      Rate and Rhythm: Normal rate and regular rhythm.      Heart sounds: Normal heart sounds.   Pulmonary:      Effort: Pulmonary effort is normal.      Breath sounds: Wheezing present.   Abdominal:      General: Bowel sounds are normal.      Palpations: Abdomen is soft.   Musculoskeletal:         General: Normal range of motion.      Cervical back: Neck supple.   Lymphadenopathy:      Cervical: No cervical  adenopathy.   Skin:     General: Skin is warm.      Findings: No rash.   Neurological:      Mental Status: She is alert.   Psychiatric:         Behavior: Behavior normal.         Results Reviewed       Procedure Component Value Units Date/Time    Comprehensive metabolic panel [[id number]]  (Abnormal) Collected: 10/31/24 0740    Lab Status: Final result Specimen: Blood from Arm, Right Updated: 10/31/24 0812     Sodium 137 mmol/L      Potassium 4.1 mmol/L      Chloride 108 mmol/L      CO2 22 mmol/L      ANION GAP 7 mmol/L      BUN 11 mg/dL      Creatinine 0.76 mg/dL      Glucose 153 mg/dL      Calcium 9.2 mg/dL      AST 24 U/L      ALT 10 U/L      Alkaline Phosphatase 70 U/L      Total Protein 7.8 g/dL      Albumin 4.2 g/dL      Total Bilirubin 0.48 mg/dL      eGFR 113 ml/min/1.73sq m     Narrative:      National Kid[employer] guidelines for Chronic Kidney Disease (CKD):     Stage 1 with normal or high GFR (GFR > 90 mL/min/1.73 square meters)    Stage 2 Mild CKD (GFR = 60-89 mL/min/1.73 square meters)    Stage 3A Moderate CKD (GFR = 45-59 mL/min/1.73 square meters)    Stage 3B Moderate CKD (GFR = 30-44 mL/min/1.73 square meters)    Stage 4 Severe CKD (GFR = 15-29 mL/min/1.73 square meters)    Stage 5 End Stage CKD (GFR <15 mL/min/1.73 square meters)  Note: GFR calculation is accurate only with a steady state creatinine    Magnesium [[id number]]  (Normal) Collected: 10/31/24 0740    Lab Status: Final result Specimen: Blood from Arm, Right Updated: 10/31/24 0812     Magnesium 2.2 mg/dL     CBC and differential [[id number]]  (Abnormal) Collected: 10/31/24 0740    Lab Status: Final result Specimen: Blood from Arm, Right Updated: 10/31/24 0748     WBC 20.80 Thousand/uL      RBC 4.34 Million/uL      Hemoglobin 11.9 g/dL      Hematocrit 37.7 %      MCV 87 fL      MCH 27.4 pg      MCHC 31.6 g/dL      RDW 16.2 %      MPV 11.3 fL      Platelets 359 Thousands/uL      nRBC 0 /100 WBCs      Segmented % 88 %      Immature  Grans % 1 %      Lymphocytes % 8 %      Monocytes % 3 %      Eosinophils Relative 0 %      Basophils Relative 0 %      Absolute Neutrophils 18.47 Thousands/µL      Absolute Immature Grans 0.15 Thousand/uL      Absolute Lymphocytes 1.60 Thousands/µL      Absolute Monocytes 0.57 Thousand/µL      Eosinophils Absolute 0.00 Thousand/µL      Basophils Absolute 0.01 Thousands/µL     POCT pregnancy, urine [[id number]]     Lab Status: No result             No orders to display       CriticalCare Time    Date/Time: 10/31/2024 9:10 AM    Performed by: Lucy Stevens PA-C  Authorized by: Lucy Stevens PA-C    Critical care provider statement:     Critical care time (minutes):  30    Critical care time was exclusive of:  Separately billable procedures and treating other patients    Critical care was time spent personally by me on the following activities:  Examination of patient, evaluation of patient's response to treatment, development of treatment plan with patient or surrogate, ordering and performing treatments and interventions, ordering and review of laboratory studies, re-evaluation of patient's condition and review of old charts      ED Medication and Procedure Management   Prior to Admission Medications   Prescriptions Last Dose Informant Patient Reported? Taking?   albuterol (2.5 mg/3 mL) 0.083 % nebulizer solution   No No   Sig: Take 3 mL (2.5 mg total) by nebulization every 6 (six) hours as needed for wheezing or shortness of breath   albuterol (PROVENTIL HFA,VENTOLIN HFA) 90 mcg/act inhaler   No No   Sig: Inhale 2 puffs 4 (four) times a day   budesonide (PULMICORT) 0.5 mg/2 mL nebulizer solution   No No   Sig: TAKE 2 ML (0.5 MG TOTAL) BY NEBULIZATION TWICE A DAY RINSE MOUTH AFTER USE   cetirizine (ZyrTEC) 10 mg tablet   No No   Sig: Take 1 tablet (10 mg total) by mouth daily   escitalopram (LEXAPRO) 5 mg tablet   No No   Sig: Take 2 tablets (10 mg total) by mouth daily   fluticasone (FLONASE) 50 mcg/act nasal  spray   Yes No   Si spray into each nostril daily   montelukast (SINGULAIR) 10 mg tablet   No No   Sig: TAKE 1 TABLET BY MOUTH DAILY AT BEDTIME   predniSONE 20 mg tablet   No No   Sig: Take 3 tablets (60 mg total) by mouth daily for 5 days   predniSONE 50 mg tablet   No No   Sig: Take 1 tablet (50 mg total) by mouth daily      Facility-Administered Medications: None     Discharge Medication List as of 10/31/2024  9:24 AM        CONTINUE these medications which have NOT CHANGED    Details   albuterol (2.5 mg/3 mL) 0.083 % nebulizer solution Take 3 mL (2.5 mg total) by nebulization every 6 (six) hours as needed for wheezing or shortness of breath, Starting Wed 10/30/2024, Normal      albuterol (PROVENTIL HFA,VENTOLIN HFA) 90 mcg/act inhaler Inhale 2 puffs 4 (four) times a day, Starting Wed 10/30/2024, Normal      budesonide (PULMICORT) 0.5 mg/2 mL nebulizer solution TAKE 2 ML (0.5 MG TOTAL) BY NEBULIZATION TWICE A DAY RINSE MOUTH AFTER USE, Normal      cetirizine (ZyrTEC) 10 mg tablet Take 1 tablet (10 mg total) by mouth daily, Starting 2024, Normal      escitalopram (LEXAPRO) 5 mg tablet Take 2 tablets (10 mg total) by mouth daily, Starting 2024, No Print      fluticasone (FLONASE) 50 mcg/act nasal spray 1 spray into each nostril daily, Starting Sat 2022, Historical Med      montelukast (SINGULAIR) 10 mg tablet TAKE 1 TABLET BY MOUTH DAILY AT BEDTIME, Starting 2024, Normal      !! predniSONE 20 mg tablet Take 3 tablets (60 mg total) by mouth daily for 5 days, Starting Wed 10/30/2024, Until Mon 2024, Normal      !! predniSONE 50 mg tablet Take 1 tablet (50 mg total) by mouth daily, Starting 2024, Normal       !! - Potential duplicate medications found. Please discuss with provider.        No discharge procedures on file.  ED SEPSIS DOCUMENTATION   Time reflects when diagnosis was documented in both MDM as applicable and the Disposition within this note       Time User  Action Codes Description Comment    10/31/2024 10:58 AM Lucy Stevens [J45.901] Asthma exacerbation                  Lucy Stevens PA-C  10/31/24 1058       Lucy Stevens PA-C  10/31/24 1058

## 2024-10-31 NOTE — TELEPHONE ENCOUNTER
Assisted PCP in completing prior authorization for Dupilumab     Pharmacist Tracking Tool  Reason For Outreach: Embedded Pharmacist  Demographics:  Intervention Method: Chart Review  Type of Intervention: Follow-Up  Topics Addressed: Asthma  Pharmacologic Interventions: Medication Initiation  Non-Pharmacologic Interventions: Care coordination and Cost  Time:  Direct Patient Care:  0  mins  Care Coordination:  25  mins  Recommendation Recipient: Provider  Outcome: Accepted    Viky BerryD, BCACP  Ambulatory Care Clinical Pharmacist

## 2024-10-31 NOTE — ASSESSMENT & PLAN NOTE
Plan as above  Orders:    dupilumab (DUPIXENT) subcutaneous injection; Inject 4 mL (600 mg total) under the skin once for 1 dose    dupilumab (DUPIXENT) subcutaneous injection; Inject 2 mL (300 mg total) under the skin every 14 (fourteen) days for 4 days, THEN 4 mL (600 mg total) every 14 (fourteen) days for 1 day.    ipratropium-albuterol (DUO-NEB) 0.5-2.5 mg/3 mL nebulizer solution; Take 3 mL by nebulization 4 (four) times a day    ipratropium-albuterol (DUO-NEB) 0.5-2.5 mg/3 mL inhalation solution 3 mL

## 2024-10-31 NOTE — PROGRESS NOTES
Ambulatory Visit  Name: Lakhwinder Puckett      : 2005      MRN: [id number]  Encounter Provider: Elias Schoen, MD  Encounter Date: 10/31/2024   Encounter department: [facility]    Assessment & Plan  Severe persistent asthma without complication  Patient is having another severe asthma exacerbation despite excellent adherence to current medication therapy.  -Current therapy includes: Budesonide nebulizer twice daily, albuterol nebulizer at least once times daily, Zyrtec to control allergies, Flonase to control upper airway symptoms  - Despite significant inhaled corticosteroid treatment she is not achieving adequate control of her asthma symptoms and having severe exacerbations  - On recent pulmonology consultation discussed initiating Dupixent if inadequate control on current therapy  - Prior authorization completed after visit with the help of Yonas Nolen clinical pharmacist in order to expedite patient receiving Dupixent  - Patient aware of dosing, 600 mg x 1, 300 mg every 2 weeks indefinitely  - Plan to follow-up upon initiation of therapy   - Adding DuoNeb prescription instead of albuterol as she reports increased symptom relief with dual therapy    Orders:    dupilumab (DUPIXENT) subcutaneous injection; Inject 4 mL (600 mg total) under the skin once for 1 dose    dupilumab (DUPIXENT) subcutaneous injection; Inject 2 mL (300 mg total) under the skin every 14 (fourteen) days for 4 days, THEN 4 mL (600 mg total) every 14 (fourteen) days for 1 day.    ipratropium-albuterol (DUO-NEB) 0.5-2.5 mg/3 mL nebulizer solution; Take 3 mL by nebulization 4 (four) times a day    ipratropium-albuterol (DUO-NEB) 0.5-2.5 mg/3 mL inhalation solution 3 mL    Eosinophilic asthma  Plan as above  Orders:    dupilumab (DUPIXENT) subcutaneous injection; Inject 4 mL (600 mg total) under the skin once for 1 dose    dupilumab (DUPIXENT) subcutaneous injection; Inject 2 mL (300 mg total) under the  skin every 14 (fourteen) days for 4 days, THEN 4 mL (600 mg total) every 14 (fourteen) days for 1 day.    ipratropium-albuterol (DUO-NEB) 0.5-2.5 mg/3 mL nebulizer solution; Take 3 mL by nebulization 4 (four) times a day    ipratropium-albuterol (DUO-NEB) 0.5-2.5 mg/3 mL inhalation solution 3 mL    Asthma due to environmental allergies  Patient has multiple allergen triggers found on IgE blood testing  Orders:    dupilumab (DUPIXENT) subcutaneous injection; Inject 4 mL (600 mg total) under the skin once for 1 dose    dupilumab (DUPIXENT) subcutaneous injection; Inject 2 mL (300 mg total) under the skin every 14 (fourteen) days for 4 days, THEN 4 mL (600 mg total) every 14 (fourteen) days for 1 day.    Anxiety  Anxiety well-controlled on Lexapro 10 mg, she did not decrease to the 5 mg daily dosing  Orders:    escitalopram (LEXAPRO) 10 mg tablet; Take 1 tablet (10 mg total) by mouth daily    Encounter for immunization    Orders:    influenza vaccine, recombinant, PF, 0.5 mL IM (Flublok)    Seasonal allergic rhinitis due to pollen    Orders:    azelastine (ASTELIN) 0.1 % nasal spray; 1 spray into each nostril 2 (two) times a day Use in each nostril as directed       History of Present Illness     Patient is a 19-year-old female here for follow-up severe asthma, she has had an asthma exacerbation since yesterday, she woke up at 2 in the morning with difficulty breathing and was unable to improve with multiple nebulizations, at 6 AM she woke up her mother and proceeded the hospital.  She was treated appropriately in the emergency room, provided oral prednisone on discharge.    Today again she required ED visit recurrence of symptoms.  She was offered admission but declined due to improvement in symptoms.  She has been having difficulty with her current symptoms.  She usually is able to control them with multiple nebulizations and oral steroids x 1 day.  Patient has been taking her cetirizine, albuterol nebulizer and  "budesonide nebulizer at home as prescribed.  She has not needed her Flonase due to improved upper airway symptoms for the last couple months.     Asthma  She complains of chest tightness, cough, difficulty breathing, shortness of breath, sputum production and wheezing. This is a recurrent problem. The current episode started yesterday. The problem has been waxing and waning. The cough is productive of sputum and productive. Associated symptoms include chest pain, dyspnea on exertion, malaise/fatigue and nasal congestion. Her symptoms are aggravated by nothing. Her symptoms are alleviated by beta-agonist, oral steroids, ipratropium and steroid inhaler. She reports minimal improvement on treatment. Risk factors for lung disease include occupational exposure. Her past medical history is significant for asthma.         Review of Systems   Constitutional:  Positive for malaise/fatigue.   Respiratory:  Positive for cough, sputum production, shortness of breath and wheezing.    Cardiovascular:  Positive for chest pain and dyspnea on exertion.           Objective     /60 (BP Location: Left arm, Patient Position: Sitting, Cuff Size: Standard)   Pulse (!) 113   Temp 98 °F (36.7 °C) (Temporal)   Resp 16   Ht 5' 3\" (1.6 m)   Wt 92.5 kg (204 lb)   LMP 10/29/2024 (Exact Date)   SpO2 96%   BMI 36.14 kg/m²     Physical Exam  Constitutional:       General: She is not in acute distress.     Appearance: Normal appearance.   HENT:      Nose: Congestion present.      Mouth/Throat:      Mouth: Mucous membranes are moist.   Cardiovascular:      Rate and Rhythm: Tachycardia present.      Heart sounds: Normal heart sounds. No murmur heard.  Pulmonary:      Effort: No respiratory distress.      Breath sounds: Wheezing present.   Musculoskeletal:         General: Normal range of motion.      Cervical back: Normal range of motion.   Neurological:      General: No focal deficit present.      Mental Status: She is alert and " oriented to person, place, and time.

## 2024-11-01 RX ORDER — AZELASTINE 1 MG/ML
1 SPRAY, METERED NASAL 2 TIMES DAILY
Qty: 1 ML | Refills: 30 | Status: SHIPPED | OUTPATIENT
Start: 2024-11-01

## 2024-11-09 ENCOUNTER — HOSPITAL ENCOUNTER (OUTPATIENT)
Facility: HOSPITAL | Age: 19
Setting detail: OBSERVATION
Discharge: HOME | End: 2024-11-09
Attending: EMERGENCY MEDICINE | Admitting: INTERNAL MEDICINE
Payer: COMMERCIAL

## 2024-11-09 VITALS
OXYGEN SATURATION: 99 % | DIASTOLIC BLOOD PRESSURE: 62 MMHG | TEMPERATURE: 98.2 F | RESPIRATION RATE: 20 BRPM | HEART RATE: 110 BPM | SYSTOLIC BLOOD PRESSURE: 109 MMHG

## 2024-11-09 DIAGNOSIS — F41.9 ANXIETY: ICD-10-CM

## 2024-11-09 DIAGNOSIS — J45.901 SEVERE ASTHMA WITH ACUTE EXACERBATION: Primary | ICD-10-CM

## 2024-11-09 PROBLEM — R55 SYNCOPE: Status: RESOLVED | Noted: 2022-06-02 | Resolved: 2024-11-09

## 2024-11-09 PROBLEM — R55 NEAR SYNCOPE: Status: RESOLVED | Noted: 2023-12-16 | Resolved: 2024-11-09

## 2024-11-09 LAB
ALBUMIN SERPL BCG-MCNC: 4 G/DL (ref 3.5–5)
ALP SERPL-CCNC: 67 U/L (ref 34–104)
ALT SERPL W P-5'-P-CCNC: 8 U/L (ref 7–52)
ANION GAP SERPL CALCULATED.3IONS-SCNC: 10 MMOL/L (ref 4–13)
AST SERPL W P-5'-P-CCNC: 13 U/L (ref 13–39)
ATRIAL RATE: 141 BPM
BASE EX.OXY STD BLDV CALC-SCNC: 98.6 % (ref 60–80)
BASE EXCESS BLDV CALC-SCNC: -5.8 MMOL/L
BASOPHILS # BLD AUTO: 0.03 THOUSANDS/ÂΜL (ref 0–0.1)
BASOPHILS NFR BLD AUTO: 0 % (ref 0–1)
BILIRUB SERPL-MCNC: 0.33 MG/DL (ref 0.2–1)
BUN SERPL-MCNC: 15 MG/DL (ref 5–25)
CALCIUM SERPL-MCNC: 9.1 MG/DL (ref 8.4–10.2)
CHLORIDE SERPL-SCNC: 106 MMOL/L (ref 96–108)
CO2 SERPL-SCNC: 21 MMOL/L (ref 21–32)
CREAT SERPL-MCNC: 0.78 MG/DL (ref 0.6–1.3)
EOSINOPHIL # BLD AUTO: 0.02 THOUSAND/ÂΜL (ref 0–0.61)
EOSINOPHIL NFR BLD AUTO: 0 % (ref 0–6)
ERYTHROCYTE [DISTWIDTH] IN BLOOD BY AUTOMATED COUNT: 15.4 % (ref 11.6–15.1)
GFR SERPL CREATININE-BSD FRML MDRD: 109 ML/MIN/1.73SQ M
GLUCOSE SERPL-MCNC: 139 MG/DL (ref 65–140)
HCG SERPL QL: NEGATIVE
HCO3 BLDV-SCNC: 19.9 MMOL/L (ref 24–30)
HCT VFR BLD AUTO: 36.9 % (ref 34.8–46.1)
HGB BLD-MCNC: 11.8 G/DL (ref 11.5–15.4)
IMM GRANULOCYTES # BLD AUTO: 0.05 THOUSAND/UL (ref 0–0.2)
IMM GRANULOCYTES NFR BLD AUTO: 1 % (ref 0–2)
LYMPHOCYTES # BLD AUTO: 3.05 THOUSANDS/ÂΜL (ref 0.6–4.47)
LYMPHOCYTES NFR BLD AUTO: 29 % (ref 14–44)
MCH RBC QN AUTO: 27.5 PG (ref 26.8–34.3)
MCHC RBC AUTO-ENTMCNC: 32 G/DL (ref 31.4–37.4)
MCV RBC AUTO: 86 FL (ref 82–98)
MONOCYTES # BLD AUTO: 1.08 THOUSAND/ÂΜL (ref 0.17–1.22)
MONOCYTES NFR BLD AUTO: 10 % (ref 4–12)
NEUTROPHILS # BLD AUTO: 6.29 THOUSANDS/ÂΜL (ref 1.85–7.62)
NEUTS SEG NFR BLD AUTO: 60 % (ref 43–75)
NRBC BLD AUTO-RTO: 0 /100 WBCS
O2 CT BLDV-SCNC: 18.5 ML/DL
P AXIS: 74 DEGREES
PCO2 BLDV: 39.7 MM HG (ref 42–50)
PH BLDV: 7.32 [PH] (ref 7.3–7.4)
PLATELET # BLD AUTO: 464 THOUSANDS/UL (ref 149–390)
PMV BLD AUTO: 10.5 FL (ref 8.9–12.7)
PO2 BLDV: 185.1 MM HG (ref 35–45)
POTASSIUM SERPL-SCNC: 3.9 MMOL/L (ref 3.5–5.3)
PR INTERVAL: 118 MS
PROT SERPL-MCNC: 7.7 G/DL (ref 6.4–8.4)
QRS AXIS: 74 DEGREES
QRSD INTERVAL: 72 MS
QT INTERVAL: 286 MS
QTC INTERVAL: 438 MS
RBC # BLD AUTO: 4.29 MILLION/UL (ref 3.81–5.12)
SODIUM SERPL-SCNC: 137 MMOL/L (ref 135–147)
T WAVE AXIS: 62 DEGREES
VENTRICULAR RATE: 141 BPM
WBC # BLD AUTO: 10.52 THOUSAND/UL (ref 4.31–10.16)

## 2024-11-09 PROCEDURE — 94760 N-INVAS EAR/PLS OXIMETRY 1: CPT

## 2024-11-09 PROCEDURE — 36415 COLL VENOUS BLD VENIPUNCTURE: CPT

## 2024-11-09 PROCEDURE — 94640 AIRWAY INHALATION TREATMENT: CPT

## 2024-11-09 PROCEDURE — 93010 ELECTROCARDIOGRAM REPORT: CPT | Performed by: STUDENT IN AN ORGANIZED HEALTH CARE EDUCATION/TRAINING PROGRAM

## 2024-11-09 PROCEDURE — 99222 1ST HOSP IP/OBS MODERATE 55: CPT | Performed by: INTERNAL MEDICINE

## 2024-11-09 PROCEDURE — 93005 ELECTROCARDIOGRAM TRACING: CPT

## 2024-11-09 PROCEDURE — 94664 DEMO&/EVAL PT USE INHALER: CPT

## 2024-11-09 PROCEDURE — 99285 EMERGENCY DEPT VISIT HI MDM: CPT

## 2024-11-09 PROCEDURE — 99236 HOSP IP/OBS SAME DATE HI 85: CPT | Performed by: INTERNAL MEDICINE

## 2024-11-09 PROCEDURE — 84703 CHORIONIC GONADOTROPIN ASSAY: CPT

## 2024-11-09 PROCEDURE — 80053 COMPREHEN METABOLIC PANEL: CPT

## 2024-11-09 PROCEDURE — 94002 VENT MGMT INPAT INIT DAY: CPT

## 2024-11-09 PROCEDURE — 94644 CONT INHLJ TX 1ST HOUR: CPT

## 2024-11-09 PROCEDURE — 96365 THER/PROPH/DIAG IV INF INIT: CPT

## 2024-11-09 PROCEDURE — 96361 HYDRATE IV INFUSION ADD-ON: CPT

## 2024-11-09 PROCEDURE — 85025 COMPLETE CBC W/AUTO DIFF WBC: CPT

## 2024-11-09 PROCEDURE — 82805 BLOOD GASES W/O2 SATURATION: CPT

## 2024-11-09 PROCEDURE — 96372 THER/PROPH/DIAG INJ SC/IM: CPT

## 2024-11-09 PROCEDURE — 99291 CRITICAL CARE FIRST HOUR: CPT

## 2024-11-09 RX ORDER — POLYETHYLENE GLYCOL 3350 17 G/17G
17 POWDER, FOR SOLUTION ORAL DAILY PRN
Status: DISCONTINUED | OUTPATIENT
Start: 2024-11-09 | End: 2024-11-09 | Stop reason: HOSPADM

## 2024-11-09 RX ORDER — EPINEPHRINE 1 MG/ML
0.3 INJECTION, SOLUTION, CONCENTRATE INTRAVENOUS ONCE
Status: COMPLETED | OUTPATIENT
Start: 2024-11-09 | End: 2024-11-09

## 2024-11-09 RX ORDER — ALBUTEROL SULFATE 5 MG/ML
10 SOLUTION RESPIRATORY (INHALATION) ONCE
Status: COMPLETED | OUTPATIENT
Start: 2024-11-09 | End: 2024-11-09

## 2024-11-09 RX ORDER — ALBUTEROL SULFATE 2.5 MG/3ML
1 SOLUTION RESPIRATORY (INHALATION) ONCE
Status: COMPLETED | OUTPATIENT
Start: 2024-11-09 | End: 2024-11-09

## 2024-11-09 RX ORDER — FLUTICASONE PROPIONATE 50 MCG
1 SPRAY, SUSPENSION (ML) NASAL DAILY
Status: DISCONTINUED | OUTPATIENT
Start: 2024-11-09 | End: 2024-11-09 | Stop reason: HOSPADM

## 2024-11-09 RX ORDER — LORATADINE 10 MG/1
10 TABLET ORAL DAILY
Status: DISCONTINUED | OUTPATIENT
Start: 2024-11-09 | End: 2024-11-09 | Stop reason: HOSPADM

## 2024-11-09 RX ORDER — SODIUM CHLORIDE FOR INHALATION 0.9 %
3 VIAL, NEBULIZER (ML) INHALATION EVERY 6 HOURS PRN
Status: DISCONTINUED | OUTPATIENT
Start: 2024-11-09 | End: 2024-11-09 | Stop reason: HOSPADM

## 2024-11-09 RX ORDER — PREDNISONE 20 MG/1
60 TABLET ORAL DAILY
Status: DISCONTINUED | OUTPATIENT
Start: 2024-11-09 | End: 2024-11-09 | Stop reason: HOSPADM

## 2024-11-09 RX ORDER — LEVALBUTEROL INHALATION SOLUTION 1.25 MG/3ML
1.25 SOLUTION RESPIRATORY (INHALATION) EVERY 6 HOURS PRN
Status: DISCONTINUED | OUTPATIENT
Start: 2024-11-09 | End: 2024-11-09 | Stop reason: HOSPADM

## 2024-11-09 RX ORDER — BUDESONIDE 0.5 MG/2ML
0.5 INHALANT ORAL
Status: DISCONTINUED | OUTPATIENT
Start: 2024-11-09 | End: 2024-11-09

## 2024-11-09 RX ORDER — IPRATROPIUM BROMIDE AND ALBUTEROL SULFATE 2.5; .5 MG/3ML; MG/3ML
3 SOLUTION RESPIRATORY (INHALATION) 4 TIMES DAILY
Status: DISCONTINUED | OUTPATIENT
Start: 2024-11-09 | End: 2024-11-09 | Stop reason: HOSPADM

## 2024-11-09 RX ORDER — MAGNESIUM SULFATE HEPTAHYDRATE 40 MG/ML
2 INJECTION, SOLUTION INTRAVENOUS ONCE
Status: COMPLETED | OUTPATIENT
Start: 2024-11-09 | End: 2024-11-09

## 2024-11-09 RX ORDER — SODIUM CHLORIDE FOR INHALATION 0.9 %
12 VIAL, NEBULIZER (ML) INHALATION ONCE
Status: COMPLETED | OUTPATIENT
Start: 2024-11-09 | End: 2024-11-09

## 2024-11-09 RX ORDER — ACETAMINOPHEN 325 MG/1
650 TABLET ORAL EVERY 6 HOURS PRN
Status: DISCONTINUED | OUTPATIENT
Start: 2024-11-09 | End: 2024-11-09 | Stop reason: HOSPADM

## 2024-11-09 RX ORDER — ONDANSETRON 2 MG/ML
4 INJECTION INTRAMUSCULAR; INTRAVENOUS EVERY 6 HOURS PRN
Status: DISCONTINUED | OUTPATIENT
Start: 2024-11-09 | End: 2024-11-09 | Stop reason: HOSPADM

## 2024-11-09 RX ORDER — PREDNISONE 20 MG/1
40 TABLET ORAL DAILY
Qty: 6 TABLET | Refills: 0 | Status: SHIPPED | OUTPATIENT
Start: 2024-11-10 | End: 2024-11-13

## 2024-11-09 RX ORDER — GUAIFENESIN 100 MG/5ML
200 SOLUTION ORAL EVERY 4 HOURS PRN
Status: DISCONTINUED | OUTPATIENT
Start: 2024-11-09 | End: 2024-11-09 | Stop reason: HOSPADM

## 2024-11-09 RX ORDER — IPRATROPIUM BROMIDE AND ALBUTEROL SULFATE .5; 3 MG/3ML; MG/3ML
1 SOLUTION RESPIRATORY (INHALATION) ONCE
Status: COMPLETED | OUTPATIENT
Start: 2024-11-09 | End: 2024-11-09

## 2024-11-09 RX ORDER — METHYLPREDNISOLONE SOD SUCC 125 MG
1 VIAL (EA) INJECTION ONCE
Status: COMPLETED | OUTPATIENT
Start: 2024-11-09 | End: 2024-11-09

## 2024-11-09 RX ORDER — HYDROXYZINE HYDROCHLORIDE 25 MG/1
25 TABLET, FILM COATED ORAL EVERY 6 HOURS PRN
Qty: 30 TABLET | Refills: 0 | Status: SHIPPED | OUTPATIENT
Start: 2024-11-09

## 2024-11-09 RX ORDER — METHYLPREDNISOLONE SODIUM SUCCINATE 125 MG/2ML
125 INJECTION, POWDER, LYOPHILIZED, FOR SOLUTION INTRAMUSCULAR; INTRAVENOUS ONCE
Status: DISCONTINUED | OUTPATIENT
Start: 2024-11-09 | End: 2024-11-09 | Stop reason: HOSPADM

## 2024-11-09 RX ORDER — MAGNESIUM HYDROXIDE/ALUMINUM HYDROXICE/SIMETHICONE 120; 1200; 1200 MG/30ML; MG/30ML; MG/30ML
30 SUSPENSION ORAL EVERY 6 HOURS PRN
Status: DISCONTINUED | OUTPATIENT
Start: 2024-11-09 | End: 2024-11-09 | Stop reason: HOSPADM

## 2024-11-09 RX ADMIN — FLUTICASONE PROPIONATE 1 SPRAY: 50 SPRAY, METERED NASAL at 09:26

## 2024-11-09 RX ADMIN — ISODIUM CHLORIDE 12 ML: 0.03 SOLUTION RESPIRATORY (INHALATION) at 03:57

## 2024-11-09 RX ADMIN — PREDNISONE 60 MG: 20 TABLET ORAL at 09:26

## 2024-11-09 RX ADMIN — IPRATROPIUM BROMIDE AND ALBUTEROL SULFATE 3 ML: .5; 3 SOLUTION RESPIRATORY (INHALATION) at 11:40

## 2024-11-09 RX ADMIN — SODIUM CHLORIDE 1000 ML: 0.9 INJECTION, SOLUTION INTRAVENOUS at 04:08

## 2024-11-09 RX ADMIN — EPINEPHRINE 0.3 MG: 1 INJECTION, SOLUTION, CONCENTRATE INTRAVENOUS at 04:04

## 2024-11-09 RX ADMIN — BUDESONIDE 0.5 MG: 0.5 INHALANT RESPIRATORY (INHALATION) at 07:13

## 2024-11-09 RX ADMIN — IPRATROPIUM BROMIDE 1 MG: 0.5 SOLUTION RESPIRATORY (INHALATION) at 03:56

## 2024-11-09 RX ADMIN — MAGNESIUM SULFATE HEPTAHYDRATE 2 G: 40 INJECTION, SOLUTION INTRAVENOUS at 04:05

## 2024-11-09 RX ADMIN — LORATADINE 10 MG: 10 TABLET ORAL at 09:26

## 2024-11-09 RX ADMIN — IPRATROPIUM BROMIDE AND ALBUTEROL SULFATE 3 ML: .5; 3 SOLUTION RESPIRATORY (INHALATION) at 07:12

## 2024-11-09 RX ADMIN — ALBUTEROL SULFATE 10 MG: 2.5 SOLUTION RESPIRATORY (INHALATION) at 03:56

## 2024-11-09 NOTE — DISCHARGE SUMMARY
Discharge Summary - Hospitalist   Name: Lakhwinder Puckett 19 y.o. female I MRN: [id number]  Unit/Bed#: E5 -01 I Date of Admission: 11/9/2024   Date of Service: 11/9/2024 I Hospital Day: 0     Assessment & Plan  Severe persistent asthma with acute exacerbation  Lung exam/wheezing quite mild compared to her severe presentation  Patient has had several admissions with similar presentations.  Patient reports that she starts to feel anxious regarding her tachycardia and she cannot stop thinking about it and then starts hyperventilating  Suspect that exacerbations are largely related to anxiety/panic attacks  Patient has limited ability to get established with psychiatry as she does not have insurance  Will add Atarax as needed for anxiety to regimen  Provide 3 additional days of steroids to complete 5-day course     Anxiety  Uncontrolled  Suspect panic attack leading to admission  Continue Lexapro  Add Atarax  Outpatient follow-up with PCP for med titration until she can get plugged in with psychiatry     Discharging Physician / Practitioner: Yevgeniy Camacho DO  PCP: Elias Schoen, MD  Admission Date:   Admission Orders (From admission, onward)       Ordered        11/09/24 0543  Place in Observation  Once                          Discharge Date: 11/09/24    Medical Problems       Resolved Problems  Date Reviewed: 10/31/2024   None         Consultations During Hospital Stay:   None    Procedures Performed: None    Significant Findings / Test Results:    No results found.    Incidental Findings: None    Test Results Pending at Discharge (will require follow up): None     Outpatient Tests Requested: None    Reason for Admission: Respiratory distress    Hospital Course:     Lakhwinder Puckett is a 19 y.o. female with past medical history of of asthma, anxiety presented with severe respiratory distress requiring BiPAP in the emergency department.  Patient was quickly weaned to room air.  Her symptoms did not correlate with the  severity of her presentation.  Suspect this was related to a panic attack.  Hopefully patient can get established with psychiatry as an outpatient.  Discharged home with plan for outpatient follow-up with PCP.    Please see above list of diagnoses and related plan for additional information.     Condition at Discharge: stable     Discharge Day Visit / Exam:     * Please refer to separate progress note for these details *    Discharge instructions/Information to patient and family:   See after visit summary for information provided to patient and family.      Provisions for Follow-Up Care:  See after visit summary for information related to follow-up care and any pertinent home health orders.      Disposition:   Home     Discharge Statement:  I spent 30 minutes discharging the patient. This time was spent on the day of discharge. I had direct contact with the patient on the day of discharge. Greater than 50% of the total time was spent examining patient, answering all patient questions, arranging and discussing plan of care with patient as well as directly providing post-discharge instructions.  Additional time then spent on discharge activities.    Discharge Medications:  See after visit summary for reconciled discharge medications provided to patient and family.

## 2024-11-09 NOTE — PLAN OF CARE
Problem: PAIN - ADULT  Goal: Verbalizes/displays adequate comfort level or baseline comfort level  Description: Interventions:  - Encourage patient to monitor pain and request assistance  - Assess pain using appropriate pain scale  - Administer analgesics based on type and severity of pain and evaluate response  - Implement non-pharmacological measures as appropriate and evaluate response  - Consider cultural and social influences on pain and pain management  - Notify physician/advanced practitioner if interventions unsuccessful or patient reports new pain  Outcome: Progressing     Problem: SAFETY ADULT  Goal: Patient will remain free of falls  Description: INTERVENTIONS:  - Educate patient/family on patient safety including physical limitations  - Instruct patient to call for assistance with activity   - Consult OT/PT to assist with strengthening/mobility   - Keep Call bell within reach  - Keep bed low and locked with side rails adjusted as appropriate  - Keep care items and personal belongings within reach  - Initiate and maintain comfort rounds  - Make Fall Risk Sign visible to staff  - Offer Toileting every 2 Hours, in advance of need  - Initiate/Maintain bed alarm  - Obtain necessary fall risk management equipment:   - Apply yellow socks and bracelet for high fall risk patients  - Consider moving patient to room near nurses station  Outcome: Progressing  Goal: Maintain or return to baseline ADL function  Description: INTERVENTIONS:  -  Assess patient's ability to carry out ADLs; assess patient's baseline for ADL function and identify physical deficits which impact ability to perform ADLs (bathing, care of mouth/teeth, toileting, grooming, dressing, etc.)  - Assess/evaluate cause of self-care deficits   - Assess range of motion  - Assess patient's mobility; develop plan if impaired  - Assess patient's need for assistive devices and provide as appropriate  - Encourage maximum independence but intervene and  supervise when necessary  - Involve family in performance of ADLs  - Assess for home care needs following discharge   - Consider OT consult to assist with ADL evaluation and planning for discharge  - Provide patient education as appropriate  Outcome: Progressing     Problem: DISCHARGE PLANNING  Goal: Discharge to home or other facility with appropriate resources  Description: INTERVENTIONS:  - Identify barriers to discharge w/patient and caregiver  - Arrange for needed discharge resources and transportation as appropriate  - Identify discharge learning needs (meds, wound care, etc.)  - Arrange for interpretive services to assist at discharge as needed  - Refer to Case Management Department for coordinating discharge planning if the patient needs post-hospital services based on physician/advanced practitioner order or complex needs related to functional status, cognitive ability, or social support system  Outcome: Progressing     Problem: Knowledge Deficit  Goal: Patient/family/caregiver demonstrates understanding of disease process, treatment plan, medications, and discharge instructions  Description: Complete learning assessment and assess knowledge base.  Interventions:  - Provide teaching at level of understanding  - Provide teaching via preferred learning methods  Outcome: Progressing     Problem: RESPIRATORY - ADULT  Goal: Achieves optimal ventilation and oxygenation  Description: INTERVENTIONS:  - Assess for changes in respiratory status  - Assess for changes in mentation and behavior  - Position to facilitate oxygenation and minimize respiratory effort  - Oxygen administered by appropriate delivery if ordered  - Initiate smoking cessation education as indicated  - Encourage broncho-pulmonary hygiene including cough, deep breathe, Incentive Spirometry  - Assess the need for suctioning and aspirate as needed  - Assess and instruct to report SOB or any respiratory difficulty  - Respiratory Therapy support as  indicated  Outcome: Progressing

## 2024-11-09 NOTE — ASSESSMENT & PLAN NOTE
Presents in extremis requiring BiPAP eventually weaned to room air     Plan:  Standing duo-neb, budesonide, prn levalbuterol   Daily Claritin   Respiratory hygiene, continuous pulse ox   Pending starting Dupixent outpatient

## 2024-11-09 NOTE — ED ATTENDING ATTESTATION
11/9/2024  I, Inder Mitchell Jr, DO, saw and evaluated the patient. I have discussed the patient with the resident/non-physician practitioner and agree with the resident's/non-physician practitioner's findings, Plan of Care, and MDM as documented in the resident's/non-physician practitioner's note, except where noted. All available labs and Radiology studies were reviewed.  I was present for key portions of any procedure(s) performed by the resident/non-physician practitioner and I was immediately available to provide assistance.       At this point I agree with the current assessment done in the Emergency Department.  I have conducted an independent evaluation of this patient a history and physical is as follows:    Final Diagnosis:  No diagnosis found.        MDM       Patient presenting with acute asthma exacerbation.  Patient appears in mod-severe distress.   Patient has decreased air movement with very minimal wheezing at this time.  Oxygen is 98% on CPAP.  Will transition her to BiPAP here, give her an hour-long DuoNeb, IV magnesium, epinephrine and a liter of normal saline to cover for evaporative losses.  Will reassess patient for clinical improvement.    If no improvement then will advance to terbutaline and ketamine bolus with drip.    4:23 AM  Patient received epi just a little while ago.  She is already looking much improved.  Tachypnea is improving, breath sounds are more present with wheezing at this time.  Will continue with BiPAP, nebulizer and magnesium and reevaluate.    4:53 AM  The patient is moving air well and in no distress and oxygen saturation is within normal limits. There is no clinical evidence to suggest pneumonia at this time. Will trial off BiPap when hour long is complete.      5:28 AM  Pt continues to do well at this time.  O2 is 97% on RA.  Will admit to [facility] for further observation given her high potential for decompensation.        Lab Results:   Abnormal Labs Reviewed   CBC  AND DIFFERENTIAL - Abnormal; Notable for the following components:       Result Value    WBC 10.52 (*)     RDW 15.4 (*)     Platelets 464 (*)     All other components within normal limits   BLOOD GAS, VENOUS - Abnormal; Notable for the following components:    pCO2, Wilner 39.7 (*)     pO2, Wilner 185.1 (*)     HCO3, Wilner 19.9 (*)     O2 HGB, VENOUS 98.6 (*)     All other components within normal limits     Lab Results: I have personally reviewed pertinent lab results.    Imaging:   No orders to display     I have personally reviewed pertinent reports.    EKG, Pathology, and Other Studies: I have personally reviewed pertinent films in PACS    Clinical Impression:    Final diagnoses:   None         Disposition    admitted to the hospital           New Prescriptions:    New Prescriptions    No medications on file            Follow-up Instructions:    No follow-up provider specified.        History of Present Illness   Lakhwinder Puckett is a 19 y.o. female who presents with Shortness of Breath (Pt arrived via EMS experiencing an asthma attack. Per EMS, pt was 76% on RA, was put on CPAP pta. Upon arrival, pt is 98% on CPAP. )    has a past medical history of Asthma, Leukocytosis, and SIRS (systemic inflammatory response syndrome) (HCC)..         Objective     Vitals:    11/09/24 0419 11/09/24 0430 11/09/24 0445 11/09/24 0515   BP: 136/59 126/58 130/60 107/58   BP Location: Left arm Left arm Left arm Left arm   Pulse: (!) 146 (!) 141 (!) 129 (!) 123   Resp: 18 18 (!) 26 (!) 50   SpO2: 99% 99% 100% 96%     There is no height or weight on file to calculate BMI.    Intake/Output Summary (Last 24 hours) at 11/9/2024 0528  Last data filed at 11/9/2024 0430  Gross per 24 hour   Intake 50 ml   Output --   Net 50 ml     Invasive Devices       Peripheral Intravenous Line  Duration             Peripheral IV 11/09/24 Dorsal (posterior);Left Hand <1 day    Peripheral IV 11/09/24 Right Antecubital <1 day                    ED Course          Critical Care Time  CriticalCare Time    Date/Time: 11/9/2024 5:27 AM    Performed by: Inder Mitchell Jr., DO  Authorized by: Inder Mitchell Jr., DO    Critical care provider statement:     Critical care time (minutes):  50    Critical care time was exclusive of:  Separately billable procedures and treating other patients and teaching time    Critical care was necessary to treat or prevent imminent or life-threatening deterioration of the following conditions:  Respiratory failure    Critical care was time spent personally by me on the following activities:  Blood draw for specimens, obtaining history from patient or surrogate, development of treatment plan with patient or surrogate, discussions with consultants, evaluation of patient's response to treatment, examination of patient, interpretation of cardiac output measurements, ordering and performing treatments and interventions, ordering and review of laboratory studies, ordering and review of radiographic studies, review of old charts and re-evaluation of patient's condition    I assumed direction of critical care for this patient from another provider in my specialty: no

## 2024-11-09 NOTE — DISCHARGE INSTR - AVS FIRST PAGE
Dear Lakhwinder Puckett,     It was our pleasure to care for you here at [facility].  It is our hope that we were always able to exceed the expected standards for your care during your stay.  You were hospitalized due to asthma exacerbation and suspected panic attack .  You were cared for on the 2nd floor under the service of Yevgeniy Camacho DO with the [facility] who covers for your primary care physician (PCP), Elias Schoen, MD, while you were hospitalized.  If you have any questions or concerns related to this hospitalization, you may contact us at .  For follow up as well as medication refills, we recommend that you follow up with your primary care physician.  A registered nurse will reach out to you by phone within a few days after your discharge to answer any additional questions that you may have after going home.  However, at this time we provide for you here, the most important instructions / recommendations at discharge:     Notable Medication Adjustments -   Take an additional 3 days of steroids to complete total 5-day course for asthma exacerbation  Start Atarax as needed for feelings of anxiety, hyperventilation  Testing Required after Discharge -   None  ** Please contact your PCP to request testing orders for any of the testing recommended here **  Important Follow Up Information -   Please follow-up with your primary doctor in 1 to 2 weeks for hospital follow-up  Please review this entire after visit summary as additional general instructions including medication list, appointments, activity, diet, any pertinent wound care, and other additional recommendations from your care team that may be provided for you.      Sincerely,     Yevgeniy Camacho DO

## 2024-11-09 NOTE — ASSESSMENT & PLAN NOTE
Uncontrolled  Suspect panic attack leading to admission  Continue Lexapro  Add Atarax  Outpatient follow-up with PCP for med titration until she can get plugged in with psychiatry

## 2024-11-09 NOTE — ED PROVIDER NOTES
Time reflects when diagnosis was documented in both MDM as applicable and the Disposition within this note       Time User Action Codes Description Comment    11/9/2024  5:44 AM Chaim Mccabeson Add [J45.901] Severe asthma with acute exacerbation           ED Disposition       ED Disposition   Admit    Condition   Stable    Date/Time   Sat Nov 9, 2024  5:44 AM    Comment   Case was discussed with Guy Campbell and the patient's admission status was agreed to be Admission Status: observation status to the service of Dr. Carter .               Assessment & Plan       Medical Decision Making  19-year-old female presenting with respiratory distress and asthma exacerbation.  On exam she has an increased work of breathing, unable to speak more than a couple words at a time; saturating well with BiPAP in place; very diminished lung sounds throughout the lung fields, occasional trace wheezing.    Patient presenting with moderate to severe respiratory distress following asthma exacerbation.  RT at bedside.  Will keep her on BiPAP, administer heart neb and IV magnesium.  Patient also seen by attending Dr. Mitchell, will add IM epinephrine due to severity of condition.    Following medications and BiPAP patient has been improving substantially.  Was trialed off of BiPAP and doing well, maintaining saturation 95% and above on room air.  Able to speak in more complete sentences now, appears much more comfortable.  Has decent air movement throughout her lung fields.  Still markedly tachycardic most likely secondary to large volume of albuterol as well as epinephrine administered.  Although she clinically improved, she does still carry risk of deterioration so we will plan to admit for observation.  Patient agreeable to this.  Discussed case with Guy Campbell, medicine.  Patient care transferred to [facility].    Amount and/or Complexity of Data Reviewed  Labs: ordered.    Risk  Prescription drug management.  Decision regarding  "hospitalization.             Medications   methylPREDNISolone sodium succinate (Solu-MEDROL) injection 125 mg (125 mg Intravenous Not Given 11/9/24 0351)   ipratropium-albuterol (FOR EMS ONLY) (DUO-NEB) 0.5-2.5 mg/3 mL inhalation solution 3 mL (0 mL Does not apply Given to EMS 11/9/24 0407)   albuterol (FOR EMS ONLY) (2.5 mg/3 mL) 0.083 % inhalation solution 2.5 mg (0 mg Does not apply Given to EMS 11/9/24 0407)   methylPREDNISolone sodium succinate (FOR EMS ONLY) (Solu-MEDROL) 125 MG injection 125 mg (0 mg Does not apply Given to EMS 11/9/24 0407)   albuterol inhalation solution 10 mg (10 mg Nebulization Given by Other 11/9/24 0356)   ipratropium (ATROVENT) 0.02 % inhalation solution 1 mg (1 mg Nebulization Given by Other 11/9/24 0356)   sodium chloride 0.9 % inhalation solution 12 mL (12 mL Nebulization Given by Other 11/9/24 0357)   magnesium sulfate 2 g/50 mL IVPB (premix) 2 g (0 g Intravenous Stopped 11/9/24 0430)   EPINEPHrine PF (ADRENALIN) 1 mg/mL injection 0.3 mg (0.3 mg Intramuscular Given 11/9/24 0404)   sodium chloride 0.9 % bolus 1,000 mL (1,000 mL Intravenous New Bag 11/9/24 0408)       ED Risk Strat Scores             CRAFFT      Flowsheet Row Most Recent Value   CRAFFT Initial Screen: During the past 12 months, did you:    1. Drink any alcohol (more than a few sips)?  No Filed at: 11/09/2024 0414   2. Smoke any marijuana or hashish No Filed at: 11/09/2024 0414   3. Use anything else to get high? (\"anything else\" includes illegal drugs, over the counter and prescription drugs, and things that you sniff or 'patiño')? No Filed at: 11/09/2024 0414                                          History of Present Illness       Chief Complaint   Patient presents with    Shortness of Breath     Pt arrived via EMS experiencing an asthma attack. Per EMS, pt was 76% on RA, was put on CPAP pta. Upon arrival, pt is 98% on CPAP.        Past Medical History:   Diagnosis Date    Asthma     Leukocytosis     SIRS (systemic " inflammatory response syndrome) (HCC)       History reviewed. No pertinent surgical history.   Family History   Problem Relation Age of Onset    Thyroid disease Mother     No Known Problems Father       Social History     Tobacco Use    Smoking status: Never    Smokeless tobacco: Never   Vaping Use    Vaping status: Never Used   Substance Use Topics    Alcohol use: Not Currently    Drug use: Not Currently      E-Cigarette/Vaping    E-Cigarette Use Never User       E-Cigarette/Vaping Substances    Nicotine No     THC No     CBD No     Flavoring No     Other No     Unknown No       I have reviewed and agree with the history as documented.     19-year-old female with PMH of asthma presents in respiratory distress.  Began experiencing asthma exacerbation yesterday, worsened today.  Has been on prednisone following an ER visit about 1.5 weeks ago.  On EMS arrival patient hypoxic 76% on room air and tripoding.  Improved a good amount en route with DuoNeb and CPAP.  On arrival in the ER not able to answer ROS very well due to her continued respiratory distress.        Review of Systems   Unable to perform ROS: Acuity of condition   Respiratory:  Positive for chest tightness and shortness of breath.            Objective       ED Triage Vitals   Temperature Pulse Blood Pressure Respirations SpO2 Patient Position - Orthostatic VS   11/09/24 0534 11/09/24 0351 11/09/24 0351 11/09/24 0351 11/09/24 0351 11/09/24 0351   98.3 °F (36.8 °C) (!) 144 147/96 20 98 % Lying      Temp Source Heart Rate Source BP Location FiO2 (%) Pain Score    11/09/24 0534 11/09/24 0351 11/09/24 0351 -- --    Oral Monitor Right arm        Vitals      Date and Time Temp Pulse SpO2 Resp BP Pain Score FACES Pain Rating User   11/09/24 0545 -- 121 96 % 31 127/59 -- --    11/09/24 0534 98.3 °F (36.8 °C) -- -- -- -- -- --    11/09/24 0515 -- 123 96 % 50 107/58 -- --    11/09/24 0445 -- 129 100 % 26 130/60 -- --    11/09/24 0430 -- 141 99 % 18 126/58 --  --    11/09/24 0419 -- 146 99 % 18 136/59 -- --    11/09/24 0356 -- -- 99 % -- -- -- --    11/09/24 0351 -- 144 98 % 20 147/96 -- --             Physical Exam  Vitals and nursing note reviewed.   Constitutional:       General: She is in acute distress.      Appearance: She is well-developed. She is ill-appearing.   HENT:      Head: Normocephalic and atraumatic.   Eyes:      Conjunctiva/sclera: Conjunctivae normal.   Cardiovascular:      Rate and Rhythm: Regular rhythm. Tachycardia present.      Heart sounds: No murmur heard.  Pulmonary:      Effort: Tachypnea, accessory muscle usage, prolonged expiration and respiratory distress present.      Breath sounds: Decreased air movement present. Decreased breath sounds present.   Abdominal:      Palpations: Abdomen is soft.      Tenderness: There is no abdominal tenderness.   Musculoskeletal:         General: No swelling.      Cervical back: Neck supple.   Skin:     General: Skin is warm and dry.      Capillary Refill: Capillary refill takes less than 2 seconds.   Neurological:      Mental Status: She is alert.   Psychiatric:         Mood and Affect: Mood normal.         Results Reviewed       Procedure Component Value Units Date/Time    hCG, qualitative pregnancy [[id number]]  (Normal) Collected: 11/09/24 0405    Lab Status: Final result Specimen: Blood from Arm, Right Updated: 11/09/24 0435     Preg, Serum Negative    Comprehensive metabolic panel [[id number]] Collected: 11/09/24 0405    Lab Status: Final result Specimen: Blood from Arm, Right Updated: 11/09/24 0427     Sodium 137 mmol/L      Potassium 3.9 mmol/L      Chloride 106 mmol/L      CO2 21 mmol/L      ANION GAP 10 mmol/L      BUN 15 mg/dL      Creatinine 0.78 mg/dL      Glucose 139 mg/dL      Calcium 9.1 mg/dL      AST 13 U/L      ALT 8 U/L      Alkaline Phosphatase 67 U/L      Total Protein 7.7 g/dL      Albumin 4.0 g/dL      Total Bilirubin 0.33 mg/dL      eGFR 109 ml/min/1.73sq m     Narrative:       National Kid[employer] Disease Foundation guidelines for Chronic Kidney Disease (CKD):     Stage 1 with normal or high GFR (GFR > 90 mL/min/1.73 square meters)    Stage 2 Mild CKD (GFR = 60-89 mL/min/1.73 square meters)    Stage 3A Moderate CKD (GFR = 45-59 mL/min/1.73 square meters)    Stage 3B Moderate CKD (GFR = 30-44 mL/min/1.73 square meters)    Stage 4 Severe CKD (GFR = 15-29 mL/min/1.73 square meters)    Stage 5 End Stage CKD (GFR <15 mL/min/1.73 square meters)  Note: GFR calculation is accurate only with a steady state creatinine    Blood gas, venous [[id number]]  (Abnormal) Collected: 11/09/24 0405    Lab Status: Final result Specimen: Blood from Arm, Right Updated: 11/09/24 0415     pH, Wilner 7.317     pCO2, Wilner 39.7 mm Hg      pO2, Wilner 185.1 mm Hg      HCO3, Wilner 19.9 mmol/L      Base Excess, Wilner -5.8 mmol/L      O2 Content, Wilner 18.5 ml/dL      O2 HGB, VENOUS 98.6 %     CBC and differential [[id number]]  (Abnormal) Collected: 11/09/24 0405    Lab Status: Final result Specimen: Blood from Arm, Right Updated: 11/09/24 0412     WBC 10.52 Thousand/uL      RBC 4.29 Million/uL      Hemoglobin 11.8 g/dL      Hematocrit 36.9 %      MCV 86 fL      MCH 27.5 pg      MCHC 32.0 g/dL      RDW 15.4 %      MPV 10.5 fL      Platelets 464 Thousands/uL      nRBC 0 /100 WBCs      Segmented % 60 %      Immature Grans % 1 %      Lymphocytes % 29 %      Monocytes % 10 %      Eosinophils Relative 0 %      Basophils Relative 0 %      Absolute Neutrophils 6.29 Thousands/µL      Absolute Immature Grans 0.05 Thousand/uL      Absolute Lymphocytes 3.05 Thousands/µL      Absolute Monocytes 1.08 Thousand/µL      Eosinophils Absolute 0.02 Thousand/µL      Basophils Absolute 0.03 Thousands/µL             No orders to display       Procedures    ED Medication and Procedure Management   Prior to Admission Medications   Prescriptions Last Dose Informant Patient Reported? Taking?   albuterol (2.5 mg/3 mL) 0.083 % nebulizer solution   No Yes   Sig: Take 3  mL (2.5 mg total) by nebulization every 6 (six) hours as needed for wheezing or shortness of breath   albuterol (PROVENTIL HFA,VENTOLIN HFA) 90 mcg/act inhaler   No Yes   Sig: Inhale 2 puffs 4 (four) times a day   azelastine (ASTELIN) 0.1 % nasal spray   No Yes   Si spray into each nostril 2 (two) times a day Use in each nostril as directed   budesonide (PULMICORT) 0.5 mg/2 mL nebulizer solution   No Yes   Sig: TAKE 2 ML (0.5 MG TOTAL) BY NEBULIZATION TWICE A DAY RINSE MOUTH AFTER USE   cetirizine (ZyrTEC) 10 mg tablet   No Yes   Sig: Take 1 tablet (10 mg total) by mouth daily   dupilumab (DUPIXENT) subcutaneous injection   No No   Sig: Inject 4 mL (600 mg total) under the skin once for 1 dose   dupilumab (DUPIXENT) subcutaneous injection   No No   Sig: Inject 2 mL (300 mg total) under the skin every 14 (fourteen) days for 4 days, THEN 4 mL (600 mg total) every 14 (fourteen) days for 1 day.   escitalopram (LEXAPRO) 10 mg tablet Not Taking  No No   Sig: Take 1 tablet (10 mg total) by mouth daily   Patient not taking: Reported on 2024   fluticasone (FLONASE) 50 mcg/act nasal spray   Yes Yes   Si spray into each nostril daily   ipratropium-albuterol (DUO-NEB) 0.5-2.5 mg/3 mL nebulizer solution   No Yes   Sig: Take 3 mL by nebulization 4 (four) times a day   predniSONE 50 mg tablet   No Yes   Sig: Take 1 tablet (50 mg total) by mouth daily      Facility-Administered Medications: None     Patient's Medications   Discharge Prescriptions    No medications on file     No discharge procedures on file.  ED SEPSIS DOCUMENTATION   Time reflects when diagnosis was documented in both MDM as applicable and the Disposition within this note       Time User Action Codes Description Comment    2024  5:44 AM George Mccabe Add [J45.901] Severe asthma with acute exacerbation                  George Mcacbe PA-C  [phone]

## 2024-11-09 NOTE — ASSESSMENT & PLAN NOTE
Lung exam/wheezing quite mild compared to her severe presentation  Patient has had several admissions with similar presentations.  Patient reports that she starts to feel anxious regarding her tachycardia and she cannot stop thinking about it and then starts hyperventilating  Suspect that exacerbations are largely related to anxiety/panic attacks  Patient has limited ability to get established with psychiatry as she does not have insurance  Will add Atarax as needed for anxiety to regimen  Provide 3 additional days of steroids to complete 5-day course

## 2024-11-09 NOTE — H&P
H&P - Hospitalist   Name: Lakhwinder Puckett 19 y.o. female I MRN: [id number]  Unit/Bed#: ED-16 I Date of Admission: 11/9/2024   Date of Service: 11/9/2024 I Hospital Day: 0     Assessment & Plan  Severe persistent asthma with acute exacerbation  Presents in extremis requiring BiPAP eventually weaned to room air     Plan:  Standing duo-neb, budesonide, prn levalbuterol   Daily Claritin   Respiratory hygiene, continuous pulse ox   Pending starting Dupixent outpatient   Anxiety  Controlled  Outpatient regimen: escitalopram 10mg daily    Plan:  Continue outpatient regimen     VTE Pharmacologic Prophylaxis: VTE Score: 2 Low Risk (Score 0-2) - Encourage Ambulation.  Code Status: Prior   Discussion with family: Updated  (family) at bedside.    Anticipated Length of Stay: Patient will be admitted on an observation basis with an anticipated length of stay of less than 2 midnights secondary to asthma.    History of Present Illness   Chief Complaint: wheezing    Lakhwinder Puckett is a 19 y.o. female with a PMH of anxiety, asthma who presents with wheezing.   History obtained from patient, chart review and discussion with ED PA-C/nursing. She presents tonight for evaluation of asthma attack. States it began in early morning. No inciting event except for feeling anxious. Was in respiratory distress on arrival to the ED requiring BiPAP and multiple nebs. No recent fevers/chills, known sick contacts. Reports that she has various triggers but no known exposures this time. Feels much improved after treatment in the ED.    Review of Systems   Constitutional:  Negative for chills and fever.   Respiratory:  Positive for cough. Negative for shortness of breath and wheezing.    Cardiovascular:  Negative for chest pain and palpitations.   Gastrointestinal:  Negative for abdominal pain, diarrhea, nausea and vomiting.   Neurological:  Negative for syncope.   All other systems reviewed and are negative.      Historical Information    Past Medical History:   Diagnosis Date    Asthma     Leukocytosis     SIRS (systemic inflammatory response syndrome) (HCC)      History reviewed. No pertinent surgical history.  Social History     Tobacco Use    Smoking status: Never    Smokeless tobacco: Never   Vaping Use    Vaping status: Never Used   Substance and Sexual Activity    Alcohol use: Not Currently    Drug use: Not Currently    Sexual activity: Not on file     E-Cigarette/Vaping    E-Cigarette Use Never User      E-Cigarette/Vaping Substances    Nicotine No     THC No     CBD No     Flavoring No     Other No     Unknown No      Family History   Problem Relation Age of Onset    Thyroid disease Mother     No Known Problems Father      Social History:  Marital Status: Single   Occupation:   Patient Pre-hospital Living Situation: Home  Patient Pre-hospital Level of Mobility: walks  Patient Pre-hospital Diet Restrictions:     Meds/Allergies   I have reviewed home medications with patient personally.  Prior to Admission medications    Medication Sig Start Date End Date Taking? Authorizing Provider   albuterol (2.5 mg/3 mL) 0.083 % nebulizer solution Take 3 mL (2.5 mg total) by nebulization every 6 (six) hours as needed for wheezing or shortness of breath 10/30/24  Yes Opal Hogue PA-C   albuterol (PROVENTIL HFA,VENTOLIN HFA) 90 mcg/act inhaler Inhale 2 puffs 4 (four) times a day 10/30/24  Yes Opal Hogue PA-C   azelastine (ASTELIN) 0.1 % nasal spray 1 spray into each nostril 2 (two) times a day Use in each nostril as directed 11/1/24  Yes Elias Schoen, MD   budesonide (PULMICORT) 0.5 mg/2 mL nebulizer solution TAKE 2 ML (0.5 MG TOTAL) BY NEBULIZATION TWICE A DAY RINSE MOUTH AFTER USE 9/16/24  Yes Elias Schoen, MD   cetirizine (ZyrTEC) 10 mg tablet Take 1 tablet (10 mg total) by mouth daily 5/22/24  Yes Elias Schoen, MD   fluticasone (FLONASE) 50 mcg/act nasal spray 1 spray into each nostril daily 6/4/22  Yes Historical Provider, MD    ipratropium-albuterol (DUO-NEB) 0.5-2.5 mg/3 mL nebulizer solution Take 3 mL by nebulization 4 (four) times a day 10/31/24  Yes Elias Schoen, MD   predniSONE 50 mg tablet Take 1 tablet (50 mg total) by mouth daily 5/22/24  Yes Elias Schoen, MD   dupilumab (DUPIXENT) subcutaneous injection Inject 4 mL (600 mg total) under the skin once for 1 dose 10/31/24 10/31/24  Elias Schoen, MD   dupilumab (DUPIXENT) subcutaneous injection Inject 2 mL (300 mg total) under the skin every 14 (fourteen) days for 4 days, THEN 4 mL (600 mg total) every 14 (fourteen) days for 1 day. 10/31/24 11/5/24  Elias Schoen, MD   escitalopram (LEXAPRO) 10 mg tablet Take 1 tablet (10 mg total) by mouth daily  Patient not taking: Reported on 11/9/2024 10/31/24   Elias Schoen, MD     No Known Allergies    Objective :  Temp:  [98.3 °F (36.8 °C)] 98.3 °F (36.8 °C)  HR:  [121-146] 121  BP: (107-147)/(58-96) 127/59  Resp:  [18-50] 31  SpO2:  [96 %-100 %] 96 %  O2 Device: None (Room air)    Physical Exam  Vitals and nursing note reviewed.   Constitutional:       General: She is not in acute distress.     Appearance: She is well-developed.   HENT:      Head: Normocephalic and atraumatic.   Eyes:      Conjunctiva/sclera: Conjunctivae normal.   Cardiovascular:      Rate and Rhythm: Normal rate and regular rhythm.      Heart sounds: No murmur heard.  Pulmonary:      Effort: Pulmonary effort is normal. No respiratory distress.      Breath sounds: Wheezing present.   Abdominal:      General: Abdomen is flat.      Palpations: Abdomen is soft.      Tenderness: There is no abdominal tenderness.   Musculoskeletal:         General: No swelling.      Cervical back: Neck supple.   Skin:     General: Skin is warm and dry.      Capillary Refill: Capillary refill takes less than 2 seconds.   Neurological:      Mental Status: She is alert and oriented to person, place, and time.   Psychiatric:         Mood and Affect: Mood normal.        Lines/Drains:      Lab Results:  I have reviewed the following results:  Results from last 7 days   Lab Units 11/09/24  0405   WBC Thousand/uL 10.52*   HEMOGLOBIN g/dL 11.8   HEMATOCRIT % 36.9   PLATELETS Thousands/uL 464*   SEGS PCT % 60   LYMPHO PCT % 29   MONO PCT % 10   EOS PCT % 0     Results from last 7 days   Lab Units 11/09/24  0405   SODIUM mmol/L 137   POTASSIUM mmol/L 3.9   CHLORIDE mmol/L 106   CO2 mmol/L 21   BUN mg/dL 15   CREATININE mg/dL 0.78   ANION GAP mmol/L 10   CALCIUM mg/dL 9.1   ALBUMIN g/dL 4.0   TOTAL BILIRUBIN mg/dL 0.33   ALK PHOS U/L 67   ALT U/L 8   AST U/L 13   GLUCOSE RANDOM mg/dL 139             Lab Results   Component Value Date    HGBA1C 5.5 08/06/2021           Imaging Results Review: No pertinent imaging studies reviewed.  Other Study Results Review: EKG was personally reviewed and my interpretation is: Sinus Tachycardia. ..    Administrative Statements   ** Please Note: This note has been constructed using a voice recognition system. **

## 2024-11-18 DIAGNOSIS — J82.83 EOSINOPHILIC ASTHMA: ICD-10-CM

## 2024-11-18 DIAGNOSIS — J45.50 SEVERE PERSISTENT ASTHMA WITHOUT COMPLICATION: ICD-10-CM

## 2024-11-20 RX ORDER — IPRATROPIUM BROMIDE AND ALBUTEROL SULFATE 2.5; .5 MG/3ML; MG/3ML
3 SOLUTION RESPIRATORY (INHALATION) 4 TIMES DAILY
Qty: 90 ML | Refills: 1 | Status: SHIPPED | OUTPATIENT
Start: 2024-11-20

## 2024-11-21 ENCOUNTER — TELEPHONE (OUTPATIENT)
Dept: FAMILY MEDICINE CLINIC | Facility: CLINIC | Age: 19
End: 2024-11-21

## 2024-11-21 NOTE — TELEPHONE ENCOUNTER
Assisted PCP with prior auth denial and f/u     Pharmacist Tracking Tool  Reason For Outreach: Embedded Pharmacist  Demographics:  Intervention Method: In Person  Type of Intervention: Follow-Up  Topics Addressed: Diabetes  Pharmacologic Interventions: Medication Initiation  Non-Pharmacologic Interventions: Cost  Time:  Direct Patient Care:  0  mins  Care Coordination:  10  mins  Recommendation Recipient: Provider  Outcome: Accepted    Viky BerryD, BCACP  Ambulatory Care Clinical Pharmacist

## 2024-11-25 ENCOUNTER — TELEPHONE (OUTPATIENT)
Dept: FAMILY MEDICINE CLINIC | Facility: CLINIC | Age: 19
End: 2024-11-25

## 2024-11-25 NOTE — TELEPHONE ENCOUNTER
PCP SIGNATURE NEEDED FOR [facility] FORM RECEIVED VIA FAX AND PLACED IN PCP FOLDER TO BE DELIVERED AT ASSIGNED TIMES.     Criteria form to be filled out.

## 2024-11-25 NOTE — TELEPHONE ENCOUNTER
PCP SIGNATURE NEEDED FOR [facility] FORM RECEIVED VIA FAX AND PLACED IN PCP FOLDER TO BE DELIVERED AT ASSIGNED TIMES.     Prior Authorization

## 2024-11-28 ENCOUNTER — APPOINTMENT (EMERGENCY)
Dept: RADIOLOGY | Facility: HOSPITAL | Age: 19
DRG: 202 | End: 2024-11-28
Payer: COMMERCIAL

## 2024-11-28 ENCOUNTER — HOSPITAL ENCOUNTER (INPATIENT)
Facility: HOSPITAL | Age: 19
LOS: 1 days | Discharge: HOME | DRG: 202 | End: 2024-12-01
Attending: EMERGENCY MEDICINE | Admitting: INTERNAL MEDICINE
Payer: COMMERCIAL

## 2024-11-28 DIAGNOSIS — J45.50 SEVERE PERSISTENT ASTHMA WITHOUT COMPLICATION: ICD-10-CM

## 2024-11-28 DIAGNOSIS — J45.51 SEVERE PERSISTENT ASTHMA WITH ACUTE EXACERBATION: Primary | ICD-10-CM

## 2024-11-28 DIAGNOSIS — J45.40 MODERATE PERSISTENT ASTHMA WITHOUT COMPLICATION: ICD-10-CM

## 2024-11-28 DIAGNOSIS — J82.83 EOSINOPHILIC ASTHMA: ICD-10-CM

## 2024-11-28 PROBLEM — D72.19 PERIPHERAL EOSINOPHILIA: Status: ACTIVE | Noted: 2024-11-28

## 2024-11-28 PROBLEM — Z91.09 ENVIRONMENTAL ALLERGIES: Status: ACTIVE | Noted: 2024-11-28

## 2024-11-28 LAB
ALBUMIN SERPL BCG-MCNC: 3.8 G/DL (ref 3.5–5)
ALP SERPL-CCNC: 75 U/L (ref 34–104)
ALT SERPL W P-5'-P-CCNC: 10 U/L (ref 7–52)
ANION GAP SERPL CALCULATED.3IONS-SCNC: 13 MMOL/L (ref 4–13)
ANION GAP SERPL CALCULATED.3IONS-SCNC: 8 MMOL/L (ref 4–13)
ANISOCYTOSIS BLD QL SMEAR: PRESENT
AST SERPL W P-5'-P-CCNC: 14 U/L (ref 13–39)
ATRIAL RATE: 132 BPM
B-HCG SERPL-ACNC: <0.6 MIU/ML (ref 0–5)
BASE EX.OXY STD BLDV CALC-SCNC: 57.7 % (ref 60–80)
BASE EX.OXY STD BLDV CALC-SCNC: 89.6 % (ref 60–80)
BASE EXCESS BLDV CALC-SCNC: -14.2 MMOL/L
BASE EXCESS BLDV CALC-SCNC: -7.3 MMOL/L
BASOPHILS # BLD MANUAL: 0 THOUSAND/UL (ref 0–0.1)
BASOPHILS NFR MAR MANUAL: 0 % (ref 0–1)
BILIRUB SERPL-MCNC: 0.32 MG/DL (ref 0.2–1)
BUN SERPL-MCNC: 13 MG/DL (ref 5–25)
BUN SERPL-MCNC: 15 MG/DL (ref 5–25)
CALCIUM SERPL-MCNC: 8.3 MG/DL (ref 8.4–10.2)
CALCIUM SERPL-MCNC: 8.9 MG/DL (ref 8.4–10.2)
CHLORIDE SERPL-SCNC: 104 MMOL/L (ref 96–108)
CHLORIDE SERPL-SCNC: 109 MMOL/L (ref 96–108)
CO2 SERPL-SCNC: 17 MMOL/L (ref 21–32)
CO2 SERPL-SCNC: 21 MMOL/L (ref 21–32)
CREAT SERPL-MCNC: 0.78 MG/DL (ref 0.6–1.3)
CREAT SERPL-MCNC: 0.95 MG/DL (ref 0.6–1.3)
EOSINOPHIL # BLD MANUAL: 1.68 THOUSAND/UL (ref 0–0.4)
EOSINOPHIL NFR BLD MANUAL: 8 % (ref 0–6)
ERYTHROCYTE [DISTWIDTH] IN BLOOD BY AUTOMATED COUNT: 15.7 % (ref 11.6–15.1)
ETHANOL SERPL-MCNC: <10 MG/DL
GFR SERPL CREATININE-BSD FRML MDRD: 109 ML/MIN/1.73SQ M
GFR SERPL CREATININE-BSD FRML MDRD: 86 ML/MIN/1.73SQ M
GLUCOSE P FAST SERPL-MCNC: 128 MG/DL (ref 65–99)
GLUCOSE SERPL-MCNC: 128 MG/DL (ref 65–140)
GLUCOSE SERPL-MCNC: 254 MG/DL (ref 65–140)
HCO3 BLDV-SCNC: 14.2 MMOL/L (ref 24–30)
HCO3 BLDV-SCNC: 19 MMOL/L (ref 24–30)
HCT VFR BLD AUTO: 36.8 % (ref 34.8–46.1)
HGB BLD-MCNC: 11.2 G/DL (ref 11.5–15.4)
LG PLATELETS BLD QL SMEAR: PRESENT
LYMPHOCYTES # BLD AUTO: 46 % (ref 14–44)
LYMPHOCYTES # BLD AUTO: 9.68 THOUSAND/UL (ref 0.6–4.47)
MCH RBC QN AUTO: 27.3 PG (ref 26.8–34.3)
MCHC RBC AUTO-ENTMCNC: 30.4 G/DL (ref 31.4–37.4)
MCV RBC AUTO: 90 FL (ref 82–98)
MONOCYTES # BLD AUTO: 0.63 THOUSAND/UL (ref 0–1.22)
MONOCYTES NFR BLD: 3 % (ref 4–12)
NEUTROPHILS # BLD MANUAL: 9.05 THOUSAND/UL (ref 1.85–7.62)
NEUTS BAND NFR BLD MANUAL: 3 % (ref 0–8)
NEUTS SEG NFR BLD AUTO: 40 % (ref 43–75)
O2 CT BLDV-SCNC: 15.4 ML/DL
O2 CT BLDV-SCNC: 9.3 ML/DL
P AXIS: 85 DEGREES
PCO2 BLDV: 41.7 MM HG (ref 42–50)
PCO2 BLDV: 43 MM HG (ref 42–50)
PH BLDV: 7.14 [PH] (ref 7.3–7.4)
PH BLDV: 7.28 [PH] (ref 7.3–7.4)
PLATELET # BLD AUTO: 452 THOUSANDS/UL (ref 149–390)
PLATELET BLD QL SMEAR: ABNORMAL
PMV BLD AUTO: 10.6 FL (ref 8.9–12.7)
PO2 BLDV: 34.5 MM HG (ref 35–45)
PO2 BLDV: 75.8 MM HG (ref 35–45)
POTASSIUM SERPL-SCNC: 3.4 MMOL/L (ref 3.5–5.3)
POTASSIUM SERPL-SCNC: 3.9 MMOL/L (ref 3.5–5.3)
PR INTERVAL: 132 MS
PROT SERPL-MCNC: 7.2 G/DL (ref 6.4–8.4)
QRS AXIS: 91 DEGREES
QRSD INTERVAL: 72 MS
QT INTERVAL: 306 MS
QTC INTERVAL: 453 MS
RBC # BLD AUTO: 4.1 MILLION/UL (ref 3.81–5.12)
RBC MORPH BLD: PRESENT
SODIUM SERPL-SCNC: 134 MMOL/L (ref 135–147)
SODIUM SERPL-SCNC: 138 MMOL/L (ref 135–147)
T WAVE AXIS: 42 DEGREES
VENTRICULAR RATE: 132 BPM
WBC # BLD AUTO: 21.04 THOUSAND/UL (ref 4.31–10.16)

## 2024-11-28 PROCEDURE — 71045 X-RAY EXAM CHEST 1 VIEW: CPT

## 2024-11-28 PROCEDURE — 84702 CHORIONIC GONADOTROPIN TEST: CPT | Performed by: EMERGENCY MEDICINE

## 2024-11-28 PROCEDURE — 94762 N-INVAS EAR/PLS OXIMTRY CONT: CPT

## 2024-11-28 PROCEDURE — 93010 ELECTROCARDIOGRAM REPORT: CPT | Performed by: STUDENT IN AN ORGANIZED HEALTH CARE EDUCATION/TRAINING PROGRAM

## 2024-11-28 PROCEDURE — 94645 CONT INHLJ TX EACH ADDL HOUR: CPT

## 2024-11-28 PROCEDURE — 36415 COLL VENOUS BLD VENIPUNCTURE: CPT

## 2024-11-28 PROCEDURE — 94640 AIRWAY INHALATION TREATMENT: CPT

## 2024-11-28 PROCEDURE — 99291 CRITICAL CARE FIRST HOUR: CPT | Performed by: EMERGENCY MEDICINE

## 2024-11-28 PROCEDURE — 82077 ASSAY SPEC XCP UR&BREATH IA: CPT | Performed by: EMERGENCY MEDICINE

## 2024-11-28 PROCEDURE — 94644 CONT INHLJ TX 1ST HOUR: CPT

## 2024-11-28 PROCEDURE — 99245 OFF/OP CONSLTJ NEW/EST HI 55: CPT | Performed by: INTERNAL MEDICINE

## 2024-11-28 PROCEDURE — 96375 TX/PRO/DX INJ NEW DRUG ADDON: CPT

## 2024-11-28 PROCEDURE — 96361 HYDRATE IV INFUSION ADD-ON: CPT

## 2024-11-28 PROCEDURE — 85007 BL SMEAR W/DIFF WBC COUNT: CPT | Performed by: EMERGENCY MEDICINE

## 2024-11-28 PROCEDURE — 82805 BLOOD GASES W/O2 SATURATION: CPT | Performed by: EMERGENCY MEDICINE

## 2024-11-28 PROCEDURE — 80048 BASIC METABOLIC PNL TOTAL CA: CPT

## 2024-11-28 PROCEDURE — 80053 COMPREHEN METABOLIC PANEL: CPT | Performed by: EMERGENCY MEDICINE

## 2024-11-28 PROCEDURE — 99222 1ST HOSP IP/OBS MODERATE 55: CPT | Performed by: STUDENT IN AN ORGANIZED HEALTH CARE EDUCATION/TRAINING PROGRAM

## 2024-11-28 PROCEDURE — 96360 HYDRATION IV INFUSION INIT: CPT

## 2024-11-28 PROCEDURE — 82805 BLOOD GASES W/O2 SATURATION: CPT

## 2024-11-28 PROCEDURE — 93005 ELECTROCARDIOGRAM TRACING: CPT

## 2024-11-28 PROCEDURE — 96372 THER/PROPH/DIAG INJ SC/IM: CPT

## 2024-11-28 PROCEDURE — 94664 DEMO&/EVAL PT USE INHALER: CPT

## 2024-11-28 PROCEDURE — 96365 THER/PROPH/DIAG IV INF INIT: CPT

## 2024-11-28 PROCEDURE — 94760 N-INVAS EAR/PLS OXIMETRY 1: CPT

## 2024-11-28 PROCEDURE — 99285 EMERGENCY DEPT VISIT HI MDM: CPT

## 2024-11-28 PROCEDURE — 85027 COMPLETE CBC AUTOMATED: CPT | Performed by: EMERGENCY MEDICINE

## 2024-11-28 RX ORDER — POLYETHYLENE GLYCOL 3350 17 G/17G
17 POWDER, FOR SOLUTION ORAL DAILY PRN
Status: DISCONTINUED | OUTPATIENT
Start: 2024-11-28 | End: 2024-12-01 | Stop reason: HOSPADM

## 2024-11-28 RX ORDER — ONDANSETRON 2 MG/ML
4 INJECTION INTRAMUSCULAR; INTRAVENOUS EVERY 6 HOURS PRN
Status: DISCONTINUED | OUTPATIENT
Start: 2024-11-28 | End: 2024-12-01 | Stop reason: HOSPADM

## 2024-11-28 RX ORDER — SODIUM CHLORIDE FOR INHALATION 0.9 %
12 VIAL, NEBULIZER (ML) INHALATION ONCE
Status: COMPLETED | OUTPATIENT
Start: 2024-11-28 | End: 2024-11-28

## 2024-11-28 RX ORDER — EPINEPHRINE 1 MG/ML
0.3 INJECTION, SOLUTION, CONCENTRATE INTRAVENOUS ONCE
Status: COMPLETED | OUTPATIENT
Start: 2024-11-28 | End: 2024-11-28

## 2024-11-28 RX ORDER — SODIUM CHLORIDE FOR INHALATION 0.9 %
3 VIAL, NEBULIZER (ML) INHALATION EVERY 6 HOURS PRN
Status: DISCONTINUED | OUTPATIENT
Start: 2024-11-28 | End: 2024-11-28

## 2024-11-28 RX ORDER — ESCITALOPRAM OXALATE 10 MG/1
10 TABLET ORAL DAILY
Status: DISCONTINUED | OUTPATIENT
Start: 2024-11-28 | End: 2024-12-01 | Stop reason: HOSPADM

## 2024-11-28 RX ORDER — ENOXAPARIN SODIUM 100 MG/ML
40 INJECTION SUBCUTANEOUS DAILY
Status: DISCONTINUED | OUTPATIENT
Start: 2024-11-28 | End: 2024-12-01 | Stop reason: HOSPADM

## 2024-11-28 RX ORDER — IPRATROPIUM BROMIDE AND ALBUTEROL SULFATE 2.5; .5 MG/3ML; MG/3ML
3 SOLUTION RESPIRATORY (INHALATION)
Status: DISCONTINUED | OUTPATIENT
Start: 2024-11-28 | End: 2024-11-28

## 2024-11-28 RX ORDER — PREDNISONE 20 MG/1
20 TABLET ORAL AS NEEDED
COMMUNITY
End: 2024-12-01

## 2024-11-28 RX ORDER — MAGNESIUM HYDROXIDE/ALUMINUM HYDROXICE/SIMETHICONE 120; 1200; 1200 MG/30ML; MG/30ML; MG/30ML
30 SUSPENSION ORAL EVERY 6 HOURS PRN
Status: DISCONTINUED | OUTPATIENT
Start: 2024-11-28 | End: 2024-12-01 | Stop reason: HOSPADM

## 2024-11-28 RX ORDER — METHYLPREDNISOLONE SODIUM SUCCINATE 125 MG/2ML
125 INJECTION, POWDER, LYOPHILIZED, FOR SOLUTION INTRAMUSCULAR; INTRAVENOUS ONCE
Status: COMPLETED | OUTPATIENT
Start: 2024-11-28 | End: 2024-11-28

## 2024-11-28 RX ORDER — LEVALBUTEROL INHALATION SOLUTION 1.25 MG/3ML
1.25 SOLUTION RESPIRATORY (INHALATION) EVERY 6 HOURS PRN
Status: DISCONTINUED | OUTPATIENT
Start: 2024-11-28 | End: 2024-12-01 | Stop reason: HOSPADM

## 2024-11-28 RX ORDER — METHYLPREDNISOLONE SODIUM SUCCINATE 40 MG/ML
40 INJECTION, POWDER, LYOPHILIZED, FOR SOLUTION INTRAMUSCULAR; INTRAVENOUS EVERY 6 HOURS SCHEDULED
Status: DISCONTINUED | OUTPATIENT
Start: 2024-11-28 | End: 2024-11-28

## 2024-11-28 RX ORDER — POTASSIUM CHLORIDE 1500 MG/1
40 TABLET, EXTENDED RELEASE ORAL ONCE
Status: COMPLETED | OUTPATIENT
Start: 2024-11-28 | End: 2024-11-28

## 2024-11-28 RX ORDER — BUDESONIDE 0.5 MG/2ML
0.5 INHALANT ORAL
Status: CANCELLED | OUTPATIENT
Start: 2024-11-28

## 2024-11-28 RX ORDER — LEVALBUTEROL INHALATION SOLUTION 1.25 MG/3ML
1.25 SOLUTION RESPIRATORY (INHALATION)
Status: DISCONTINUED | OUTPATIENT
Start: 2024-11-28 | End: 2024-12-01 | Stop reason: HOSPADM

## 2024-11-28 RX ORDER — ACETAMINOPHEN 325 MG/1
650 TABLET ORAL EVERY 6 HOURS PRN
Status: DISCONTINUED | OUTPATIENT
Start: 2024-11-28 | End: 2024-12-01 | Stop reason: HOSPADM

## 2024-11-28 RX ORDER — FLUTICASONE FUROATE AND VILANTEROL 200; 25 UG/1; UG/1
1 POWDER RESPIRATORY (INHALATION) DAILY
Status: DISCONTINUED | OUTPATIENT
Start: 2024-11-28 | End: 2024-11-29

## 2024-11-28 RX ORDER — PREDNISONE 20 MG/1
40 TABLET ORAL DAILY
Status: DISCONTINUED | OUTPATIENT
Start: 2024-11-29 | End: 2024-11-29

## 2024-11-28 RX ORDER — IPRATROPIUM BROMIDE AND ALBUTEROL SULFATE .5; 3 MG/3ML; MG/3ML
2 SOLUTION RESPIRATORY (INHALATION) ONCE
Status: COMPLETED | OUTPATIENT
Start: 2024-11-28 | End: 2024-11-28

## 2024-11-28 RX ORDER — MAGNESIUM SULFATE HEPTAHYDRATE 40 MG/ML
1 INJECTION, SOLUTION INTRAVENOUS ONCE
Status: COMPLETED | OUTPATIENT
Start: 2024-11-28 | End: 2024-11-28

## 2024-11-28 RX ORDER — ONDANSETRON 2 MG/ML
4 INJECTION INTRAMUSCULAR; INTRAVENOUS ONCE
Status: COMPLETED | OUTPATIENT
Start: 2024-11-28 | End: 2024-11-28

## 2024-11-28 RX ORDER — LORATADINE 10 MG/1
10 TABLET ORAL DAILY
COMMUNITY

## 2024-11-28 RX ORDER — METHYLPREDNISOLONE SODIUM SUCCINATE 40 MG/ML
40 INJECTION, POWDER, LYOPHILIZED, FOR SOLUTION INTRAMUSCULAR; INTRAVENOUS EVERY 8 HOURS SCHEDULED
Status: COMPLETED | OUTPATIENT
Start: 2024-11-28 | End: 2024-11-28

## 2024-11-28 RX ORDER — ALBUTEROL SULFATE 5 MG/ML
10 SOLUTION RESPIRATORY (INHALATION) ONCE
Status: COMPLETED | OUTPATIENT
Start: 2024-11-28 | End: 2024-11-28

## 2024-11-28 RX ORDER — ALBUTEROL SULFATE 5 MG/ML
SOLUTION RESPIRATORY (INHALATION)
Status: COMPLETED
Start: 2024-11-28 | End: 2024-11-28

## 2024-11-28 RX ORDER — SODIUM CHLORIDE FOR INHALATION 0.9 %
VIAL, NEBULIZER (ML) INHALATION
Status: COMPLETED
Start: 2024-11-28 | End: 2024-11-28

## 2024-11-28 RX ORDER — GUAIFENESIN 100 MG/5ML
200 SOLUTION ORAL EVERY 4 HOURS PRN
Status: DISCONTINUED | OUTPATIENT
Start: 2024-11-28 | End: 2024-12-01 | Stop reason: HOSPADM

## 2024-11-28 RX ADMIN — LEVALBUTEROL HYDROCHLORIDE 1.25 MG: 1.25 SOLUTION RESPIRATORY (INHALATION) at 21:53

## 2024-11-28 RX ADMIN — SODIUM CHLORIDE, SODIUM LACTATE, POTASSIUM CHLORIDE, AND CALCIUM CHLORIDE 1000 ML: .6; .31; .03; .02 INJECTION, SOLUTION INTRAVENOUS at 03:45

## 2024-11-28 RX ADMIN — ALBUTEROL SULFATE 10 MG: 5 SOLUTION RESPIRATORY (INHALATION) at 01:26

## 2024-11-28 RX ADMIN — ENOXAPARIN SODIUM 40 MG: 40 INJECTION SUBCUTANEOUS at 08:27

## 2024-11-28 RX ADMIN — METHYLPREDNISOLONE SODIUM SUCCINATE 40 MG: 40 INJECTION, POWDER, FOR SOLUTION INTRAMUSCULAR; INTRAVENOUS at 13:45

## 2024-11-28 RX ADMIN — ALBUTEROL SULFATE 10 MG: 2.5 SOLUTION RESPIRATORY (INHALATION) at 01:26

## 2024-11-28 RX ADMIN — ISODIUM CHLORIDE 12 ML: 0.03 SOLUTION RESPIRATORY (INHALATION) at 04:08

## 2024-11-28 RX ADMIN — IPRATROPIUM BROMIDE AND ALBUTEROL SULFATE 3 ML: 2.5; .5 SOLUTION RESPIRATORY (INHALATION) at 07:04

## 2024-11-28 RX ADMIN — POTASSIUM CHLORIDE 40 MEQ: 1500 TABLET, EXTENDED RELEASE ORAL at 06:43

## 2024-11-28 RX ADMIN — ESCITALOPRAM OXALATE 10 MG: 10 TABLET ORAL at 08:27

## 2024-11-28 RX ADMIN — ONDANSETRON 4 MG: 2 INJECTION INTRAMUSCULAR; INTRAVENOUS at 01:28

## 2024-11-28 RX ADMIN — UMECLIDINIUM 1 PUFF: 62.5 AEROSOL, POWDER ORAL at 08:27

## 2024-11-28 RX ADMIN — FLUTICASONE FUROATE AND VILANTEROL TRIFENATATE 1 PUFF: 200; 25 POWDER RESPIRATORY (INHALATION) at 08:27

## 2024-11-28 RX ADMIN — ISODIUM CHLORIDE 12 ML: 0.03 SOLUTION RESPIRATORY (INHALATION) at 01:25

## 2024-11-28 RX ADMIN — EPINEPHRINE 0.3 MG: 1 INJECTION, SOLUTION, CONCENTRATE INTRAVENOUS at 01:38

## 2024-11-28 RX ADMIN — LEVALBUTEROL HYDROCHLORIDE 1.25 MG: 1.25 SOLUTION RESPIRATORY (INHALATION) at 19:46

## 2024-11-28 RX ADMIN — IPRATROPIUM BROMIDE 1 MG: 0.5 SOLUTION RESPIRATORY (INHALATION) at 01:26

## 2024-11-28 RX ADMIN — IPRATROPIUM BROMIDE 1 MG: 0.5 SOLUTION RESPIRATORY (INHALATION) at 04:09

## 2024-11-28 RX ADMIN — METHYLPREDNISOLONE SODIUM SUCCINATE 40 MG: 40 INJECTION, POWDER, FOR SOLUTION INTRAMUSCULAR; INTRAVENOUS at 06:21

## 2024-11-28 RX ADMIN — Medication 1 MG: at 01:26

## 2024-11-28 RX ADMIN — METHYLPREDNISOLONE SODIUM SUCCINATE 125 MG: 125 INJECTION, POWDER, FOR SOLUTION INTRAMUSCULAR; INTRAVENOUS at 01:31

## 2024-11-28 RX ADMIN — SODIUM CHLORIDE 1000 ML: 0.9 INJECTION, SOLUTION INTRAVENOUS at 01:28

## 2024-11-28 RX ADMIN — ALBUTEROL SULFATE 10 MG: 2.5 SOLUTION RESPIRATORY (INHALATION) at 04:09

## 2024-11-28 RX ADMIN — Medication 12 ML: at 01:25

## 2024-11-28 RX ADMIN — METHYLPREDNISOLONE SODIUM SUCCINATE 40 MG: 40 INJECTION, POWDER, FOR SOLUTION INTRAMUSCULAR; INTRAVENOUS at 21:27

## 2024-11-28 RX ADMIN — LEVALBUTEROL HYDROCHLORIDE 1.25 MG: 1.25 SOLUTION RESPIRATORY (INHALATION) at 13:06

## 2024-11-28 NOTE — ASSESSMENT & PLAN NOTE
Arrived to ED overnight required hour-long, epinephrine, Solu-Medrol and magnesium with subsequent improvement in symptoms  Patient is on room air  No significant wheezing on exam currently  Currently on IV Solu-Medrol 40 mg every 6 hours  Could likely transition to oral prednisone, would suggest a slower taper for this patient  Reports compliance with her home Pulmicort nebs and Zyrtec along with Astelin and Flonase nasal sprays  Continue around-the-clock nebs while admitted, will switch to Xopenex given her tachycardia

## 2024-11-28 NOTE — ASSESSMENT & PLAN NOTE
Presents in acute exacerbation requiring hour neb, epinephrine, solu-medrol and magnesium with improvement. Follows [facility] pulmonary. Unclear cause for exacerbation    Plan:  Solumedrol load in ED, continue 40mg q6h  Standing duo-neb, Trelegy Ellipta, PRN levalbuterol   Pulmonary consult

## 2024-11-28 NOTE — H&P
H&P - Hospitalist   Name: Lakhwinder Puckett 19 y.o. female I MRN: [id number]  Unit/Bed#: ED-07 I Date of Admission: 11/28/2024   Date of Service: 11/28/2024 I Hospital Day: 0     Assessment & Plan  Severe persistent asthma with acute exacerbation  Presents in acute exacerbation requiring hour neb, epinephrine, solu-medrol and magnesium with improvement. Follows [facility] pulmonary. Unclear cause for exacerbation    Plan:  Solumedrol load in ED, continue 40mg q6h  Standing duo-neb, Trelegy Ellipta, PRN levalbuterol   Pulmonary consult  Anxiety  Thought to be contributing last admission    Plan:  Continue lexapro    VTE Pharmacologic Prophylaxis: VTE Score: 3 Moderate Risk (Score 3-4) - Pharmacological DVT Prophylaxis Ordered: enoxaparin (Lovenox).  Code Status: Prior   Discussion with family: Updated  (family) at bedside.    Anticipated Length of Stay: Patient will be admitted on an observation basis with an anticipated length of stay of less than 2 midnights secondary to asthma.    History of Present Illness   Chief Complaint: asthma    Lakhwinder Puckett is a 19 y.o. female with a PMH of severe persistent asthma, obesity who presents with asthma.   History obtained from patient, chart review and discussion with ED attending. She presents tonight with acute asthma exacerbation. She states that over the course of the day her breathing just didn't feel right. She was using her nebulizers without much relief. Tonight she felt worse so this prompted her ED visit. Denies any known trigger exposures. No recent sick contacts or fevers. Reports compliance with medication regimen.     Review of Systems   Constitutional:  Negative for chills and fever.   Respiratory:  Positive for cough, chest tightness and shortness of breath.    Cardiovascular:  Negative for chest pain and palpitations.   Gastrointestinal:  Negative for abdominal pain, diarrhea, nausea and vomiting.   Neurological:  Negative for syncope.   All other systems  reviewed and are negative.      Historical Information   Past Medical History:   Diagnosis Date    Asthma     Leukocytosis 6/21/2022    SIRS (systemic inflammatory response syndrome) (HCC) 12/16/2023     History reviewed. No pertinent surgical history.  Social History     Tobacco Use    Smoking status: Never    Smokeless tobacco: Never   Vaping Use    Vaping status: Never Used   Substance and Sexual Activity    Alcohol use: Not Currently    Drug use: Not Currently    Sexual activity: Not on file     E-Cigarette/Vaping    E-Cigarette Use Never User      E-Cigarette/Vaping Substances    Nicotine No     THC No     CBD No     Flavoring No     Other No     Unknown No      Family History   Problem Relation Age of Onset    Thyroid disease Mother     No Known Problems Father      Social History:  Marital Status: Single   Occupation:   Patient Pre-hospital Living Situation: Home  Patient Pre-hospital Level of Mobility: walks  Patient Pre-hospital Diet Restrictions:     Meds/Allergies   I have reviewed home medications with patient personally.  Prior to Admission medications    Medication Sig Start Date End Date Taking? Authorizing Provider   albuterol (2.5 mg/3 mL) 0.083 % nebulizer solution Take 3 mL (2.5 mg total) by nebulization every 6 (six) hours as needed for wheezing or shortness of breath 10/30/24  Yes Opal Hogue PA-C   albuterol (PROVENTIL HFA,VENTOLIN HFA) 90 mcg/act inhaler Inhale 2 puffs 4 (four) times a day 10/30/24  Yes Opal Hogue PA-C   azelastine (ASTELIN) 0.1 % nasal spray 1 spray into each nostril 2 (two) times a day Use in each nostril as directed 11/1/24  Yes Elias Schoen, MD   hydrOXYzine HCL (ATARAX) 25 mg tablet Take 1 tablet (25 mg total) by mouth every 6 (six) hours as needed for anxiety 11/9/24  Yes Yevgeniy Camacho,    ipratropium-albuterol (DUO-NEB) 0.5-2.5 mg/3 mL nebulizer solution Take 3 mL by nebulization 4 (four) times a day 11/20/24  Yes Elias Schoen, MD    loratadine (CLARITIN) 10 mg tablet Take 10 mg by mouth daily   Yes Historical Provider, MD   predniSONE 20 mg tablet Take 20 mg by mouth if needed (As needed)   Yes Historical Provider, MD   budesonide (PULMICORT) 0.5 mg/2 mL nebulizer solution TAKE 2 ML (0.5 MG TOTAL) BY NEBULIZATION TWICE A DAY RINSE MOUTH AFTER USE  Patient not taking: Reported on 11/28/2024 9/16/24   Elias Schoen, MD   cetirizine (ZyrTEC) 10 mg tablet Take 1 tablet (10 mg total) by mouth daily  Patient not taking: Reported on 11/28/2024 5/22/24   Elias Schoen, MD   dupilumab (DUPIXENT) subcutaneous injection Inject 4 mL (600 mg total) under the skin once for 1 dose 10/31/24 10/31/24  Elias Schoen, MD   dupilumab (DUPIXENT) subcutaneous injection Inject 2 mL (300 mg total) under the skin every 14 (fourteen) days for 4 days, THEN 4 mL (600 mg total) every 14 (fourteen) days for 1 day. 10/31/24 11/5/24  Elias Schoen, MD   escitalopram (LEXAPRO) 10 mg tablet Take 1 tablet (10 mg total) by mouth daily  Patient not taking: Reported on 11/28/2024 10/31/24   Elias Schoen, MD   fluticasone (FLONASE) 50 mcg/act nasal spray 1 spray into each nostril daily  Patient not taking: Reported on 11/28/2024 6/4/22   Historical Provider, MD     No Known Allergies    Objective :  Temp:  [99 °F (37.2 °C)] 99 °F (37.2 °C)  HR:  [100-135] 102  BP: ()/(41-64) 97/47  Resp:  [14-21] 16  SpO2:  [92 %-100 %] 100 %  O2 Device: None (Room air)  Nasal Cannula O2 Flow Rate (L/min):  [4 L/min] 4 L/min    Physical Exam  Vitals and nursing note reviewed.   Constitutional:       General: She is not in acute distress.     Appearance: She is well-developed.   HENT:      Head: Normocephalic and atraumatic.   Eyes:      Conjunctiva/sclera: Conjunctivae normal.   Cardiovascular:      Rate and Rhythm: Normal rate and regular rhythm.      Heart sounds: No murmur heard.  Pulmonary:      Effort: Pulmonary effort is normal. No respiratory distress.      Breath sounds: Wheezing  (bilateral expiratory) present.   Abdominal:      Palpations: Abdomen is soft.      Tenderness: There is no abdominal tenderness.   Musculoskeletal:         General: No swelling.      Cervical back: Neck supple.   Skin:     General: Skin is warm and dry.      Capillary Refill: Capillary refill takes less than 2 seconds.   Neurological:      Mental Status: She is alert and oriented to person, place, and time.   Psychiatric:         Mood and Affect: Mood normal.        Lines/Drains:    Lab Results: I have reviewed the following results:  Results from last 7 days   Lab Units 11/28/24  0122   WBC Thousand/uL 21.04*   HEMOGLOBIN g/dL 11.2*   HEMATOCRIT % 36.8   PLATELETS Thousands/uL 452*     Results from last 7 days   Lab Units 11/28/24  0122   SODIUM mmol/L 134*   POTASSIUM mmol/L 3.9   CHLORIDE mmol/L 104   CO2 mmol/L 17*   BUN mg/dL 15   CREATININE mg/dL 0.95   ANION GAP mmol/L 13   CALCIUM mg/dL 8.9   ALBUMIN g/dL 3.8   TOTAL BILIRUBIN mg/dL 0.32   ALK PHOS U/L 75   ALT U/L 10   AST U/L 14   GLUCOSE RANDOM mg/dL 254*             Lab Results   Component Value Date    HGBA1C 5.5 08/06/2021           Imaging Results Review: I personally reviewed the following image studies in PACS and associated radiology reports: chest xray. My interpretation of the radiology images/reports is: no acute cardiopulmonary disease.  Other Study Results Review: EKG was personally reviewed and my interpretation is: Sinus Tachycardia. ..    Administrative Statements   ** Please Note: This note has been constructed using a voice recognition system. **

## 2024-11-28 NOTE — PLAN OF CARE
Problem: PAIN - ADULT  Goal: Verbalizes/displays adequate comfort level or baseline comfort level  Description: Interventions:  - Encourage patient to monitor pain and request assistance  - Assess pain using appropriate pain scale  - Administer analgesics based on type and severity of pain and evaluate response  - Implement non-pharmacological measures as appropriate and evaluate response  - Consider cultural and social influences on pain and pain management  - Notify physician/advanced practitioner if interventions unsuccessful or patient reports new pain  Outcome: Progressing     Problem: INFECTION - ADULT  Goal: Absence or prevention of progression during hospitalization  Description: INTERVENTIONS:  - Assess and monitor for signs and symptoms of infection  - Monitor lab/diagnostic results  - Monitor all insertion sites, i.e. indwelling lines, tubes, and drains  - Monitor endotracheal if appropriate and nasal secretions for changes in amount and color  - [place] appropriate cooling/warming therapies per order  - Administer medications as ordered  - Instruct and encourage patient and family to use good hand hygiene technique  - Identify and instruct in appropriate isolation precautions for identified infection/condition  Outcome: Progressing  Goal: Absence of fever/infection during neutropenic period  Description: INTERVENTIONS:  - Monitor WBC    Outcome: Progressing     Problem: SAFETY ADULT  Goal: Patient will remain free of falls  Description: INTERVENTIONS:  - Educate patient/family on patient safety including physical limitations  - Instruct patient to call for assistance with activity   - Consult OT/PT to assist with strengthening/mobility   - Keep Call bell within reach  - Keep bed low and locked with side rails adjusted as appropriate  - Keep care items and personal belongings within reach  - Initiate and maintain comfort rounds  - Make Fall Risk Sign visible to staff  - Offer Toileting every 2 Hours,  in advance of need  - Initiate/Maintain bed alarm  - Obtain necessary fall risk management equipment  - Apply yellow socks and bracelet for high fall risk patients  - Consider moving patient to room near nurses station  Outcome: Progressing  Goal: Maintain or return to baseline ADL function  Description: INTERVENTIONS:  -  Assess patient's ability to carry out ADLs; assess patient's baseline for ADL function and identify physical deficits which impact ability to perform ADLs (bathing, care of mouth/teeth, toileting, grooming, dressing, etc.)  - Assess/evaluate cause of self-care deficits   - Assess range of motion  - Assess patient's mobility; develop plan if impaired  - Assess patient's need for assistive devices and provide as appropriate  - Encourage maximum independence but intervene and supervise when necessary  - Involve family in performance of ADLs  - Assess for home care needs following discharge   - Consider OT consult to assist with ADL evaluation and planning for discharge  - Provide patient education as appropriate  Outcome: Progressing  Goal: Maintains/Returns to pre admission functional level  Description: INTERVENTIONS:  - Perform AM-PAC 6 Click Basic Mobility/ Daily Activity assessment daily.  - Set and communicate daily mobility goal to care team and patient/family/caregiver.   - Collaborate with rehabilitation services on mobility goals if consulted  - Perform Range of Motion 3 times a day.  - Reposition patient every 2 hours.  - Dangle patient 3 times a day  - Stand patient 3 times a day  - Ambulate patient 3 times a day  - Out of bed to chair 3 times a day   - Out of bed for meals 3 times a day  - Out of bed for toileting  - Record patient progress and toleration of activity level   Outcome: Progressing     Problem: DISCHARGE PLANNING  Goal: Discharge to home or other facility with appropriate resources  Description: INTERVENTIONS:  - Identify barriers to discharge w/patient and caregiver  -  Arrange for needed discharge resources and transportation as appropriate  - Identify discharge learning needs (meds, wound care, etc.)  - Arrange for interpretive services to assist at discharge as needed  - Refer to Case Management Department for coordinating discharge planning if the patient needs post-hospital services based on physician/advanced practitioner order or complex needs related to functional status, cognitive ability, or social support system  Outcome: Progressing     Problem: Knowledge Deficit  Goal: Patient/family/caregiver demonstrates understanding of disease process, treatment plan, medications, and discharge instructions  Description: Complete learning assessment and assess knowledge base.  Interventions:  - Provide teaching at level of understanding  - Provide teaching via preferred learning methods  Outcome: Progressing     Problem: NEUROSENSORY - ADULT  Goal: Achieves stable or improved neurological status  Description: INTERVENTIONS  - Monitor and report changes in neurological status  - Monitor vital signs such as temperature, blood pressure, glucose, and any other labs ordered   - Initiate measures to prevent increased intracranial pressure  - Monitor for seizure activity and implement precautions if appropriate      Outcome: Progressing  Goal: Achieves maximal functionality and self care  Description: INTERVENTIONS  - Monitor swallowing and airway patency with patient fatigue and changes in neurological status  - Encourage and assist patient to increase activity and self care.   - Encourage visually impaired, hearing impaired and aphasic patients to use assistive/communication devices  Outcome: Progressing     Problem: RESPIRATORY - ADULT  Goal: Achieves optimal ventilation and oxygenation  Description: INTERVENTIONS:  - Assess for changes in respiratory status  - Assess for changes in mentation and behavior  - Position to facilitate oxygenation and minimize respiratory effort  - Oxygen  administered by appropriate delivery if ordered  - Initiate smoking cessation education as indicated  - Encourage broncho-pulmonary hygiene including cough, deep breathe, Incentive Spirometry  - Assess the need for suctioning and aspirate as needed  - Assess and instruct to report SOB or any respiratory difficulty  - Respiratory Therapy support as indicated  Outcome: Progressing     Problem: METABOLIC, FLUID AND ELECTROLYTES - ADULT  Goal: Electrolytes maintained within normal limits  Description: INTERVENTIONS:  - Monitor labs and assess patient for signs and symptoms of electrolyte imbalances  - Administer electrolyte replacement as ordered  - Monitor response to electrolyte replacements, including repeat lab results as appropriate  - Instruct patient on fluid and nutrition as appropriate  Outcome: Progressing     Problem: MUSCULOSKELETAL - ADULT  Goal: Maintain or return mobility to safest level of function  Description: INTERVENTIONS:  - Assess patient's ability to carry out ADLs; assess patient's baseline for ADL function and identify physical deficits which impact ability to perform ADLs (bathing, care of mouth/teeth, toileting, grooming, dressing, etc.)  - Assess/evaluate cause of self-care deficits   - Assess range of motion  - Assess patient's mobility  - Assess patient's need for assistive devices and provide as appropriate  - Encourage maximum independence but intervene and supervise when necessary  - Involve family in performance of ADLs  - Assess for home care needs following discharge   - Consider OT consult to assist with ADL evaluation and planning for discharge  - Provide patient education as appropriate  Outcome: Progressing

## 2024-11-28 NOTE — PLAN OF CARE
Problem: PAIN - ADULT  Goal: Verbalizes/displays adequate comfort level or baseline comfort level  Description: Interventions:  - Encourage patient to monitor pain and request assistance  - Assess pain using appropriate pain scale  - Administer analgesics based on type and severity of pain and evaluate response  - Implement non-pharmacological measures as appropriate and evaluate response  - Consider cultural and social influences on pain and pain management  - Notify physician/advanced practitioner if interventions unsuccessful or patient reports new pain  Outcome: Progressing     Problem: INFECTION - ADULT  Goal: Absence or prevention of progression during hospitalization  Description: INTERVENTIONS:  - Assess and monitor for signs and symptoms of infection  - Monitor lab/diagnostic results  - Monitor all insertion sites, i.e. indwelling lines, tubes, and drains  - Monitor endotracheal if appropriate and nasal secretions for changes in amount and color  - [place] appropriate cooling/warming therapies per order  - Administer medications as ordered  - Instruct and encourage patient and family to use good hand hygiene technique  - Identify and instruct in appropriate isolation precautions for identified infection/condition  Outcome: Progressing  Goal: Absence of fever/infection during neutropenic period  Description: INTERVENTIONS:  - Monitor WBC    Outcome: Progressing     Problem: SAFETY ADULT  Goal: Patient will remain free of falls  Description: INTERVENTIONS:  - Educate patient/family on patient safety including physical limitations  - Instruct patient to call for assistance with activity   - Consult OT/PT to assist with strengthening/mobility   - Keep Call bell within reach  - Keep bed low and locked with side rails adjusted as appropriate  - Keep care items and personal belongings within reach  - Initiate and maintain comfort rounds  - Make Fall Risk Sign visible to staff  - Offer Toileting every 2 Hours,  in advance of need  - Initiate/Maintain bed alarm  - Obtain necessary fall risk management equipment  - Apply yellow socks and bracelet for high fall risk patients  - Consider moving patient to room near nurses station  Outcome: Progressing  Goal: Maintain or return to baseline ADL function  Description: INTERVENTIONS:  -  Assess patient's ability to carry out ADLs; assess patient's baseline for ADL function and identify physical deficits which impact ability to perform ADLs (bathing, care of mouth/teeth, toileting, grooming, dressing, etc.)  - Assess/evaluate cause of self-care deficits   - Assess range of motion  - Assess patient's mobility; develop plan if impaired  - Assess patient's need for assistive devices and provide as appropriate  - Encourage maximum independence but intervene and supervise when necessary  - Involve family in performance of ADLs  - Assess for home care needs following discharge   - Consider OT consult to assist with ADL evaluation and planning for discharge  - Provide patient education as appropriate  Outcome: Progressing  Goal: Maintains/Returns to pre admission functional level  Description: INTERVENTIONS:  - Perform AM-PAC 6 Click Basic Mobility/ Daily Activity assessment daily.  - Set and communicate daily mobility goal to care team and patient/family/caregiver.   - Collaborate with rehabilitation services on mobility goals if consulted  - Perform Range of Motion 3 times a day.  - Reposition patient every 2 hours.  - Dangle patient 3 times a day  - Stand patient 3 times a day  - Ambulate patient 3 times a day  - Out of bed to chair 3 times a day   - Out of bed for meals 3 times a day  - Out of bed for toileting  - Record patient progress and toleration of activity level   Outcome: Progressing    Problem: NEUROSENSORY - ADULT  Goal: Achieves stable or improved neurological status  Description: INTERVENTIONS  - Monitor and report changes in neurological status  - Monitor vital signs  such as temperature, blood pressure, glucose, and any other labs ordered   - Initiate measures to prevent increased intracranial pressure  - Monitor for seizure activity and implement precautions if appropriate      Outcome: Progressing  Goal: Achieves maximal functionality and self care  Description: INTERVENTIONS  - Monitor swallowing and airway patency with patient fatigue and changes in neurological status  - Encourage and assist patient to increase activity and self care.   - Encourage visually impaired, hearing impaired and aphasic patients to use assistive/communication devices  Outcome: Progressing     Problem: RESPIRATORY - ADULT  Goal: Achieves optimal ventilation and oxygenation  Description: INTERVENTIONS:  - Assess for changes in respiratory status  - Assess for changes in mentation and behavior  - Position to facilitate oxygenation and minimize respiratory effort  - Oxygen administered by appropriate delivery if ordered  - Initiate smoking cessation education as indicated  - Encourage broncho-pulmonary hygiene including cough, deep breathe, Incentive Spirometry  - Assess the need for suctioning and aspirate as needed  - Assess and instruct to report SOB or any respiratory difficulty  - Respiratory Therapy support as indicated  Outcome: Progressing     Problem: METABOLIC, FLUID AND ELECTROLYTES - ADULT  Goal: Electrolytes maintained within normal limits  Description: INTERVENTIONS:  - Monitor labs and assess patient for signs and symptoms of electrolyte imbalances  - Administer electrolyte replacement as ordered  - Monitor response to electrolyte replacements, including repeat lab results as appropriate  - Instruct patient on fluid and nutrition as appropriate  Outcome: Progressing     Problem: MUSCULOSKELETAL - ADULT  Goal: Maintain or return mobility to safest level of function  Description: INTERVENTIONS:  - Assess patient's ability to carry out ADLs; assess patient's baseline for ADL function and  identify physical deficits which impact ability to perform ADLs (bathing, care of mouth/teeth, toileting, grooming, dressing, etc.)  - Assess/evaluate cause of self-care deficits   - Assess range of motion  - Assess patient's mobility  - Assess patient's need for assistive devices and provide as appropriate  - Encourage maximum independence but intervene and supervise when necessary  - Involve family in performance of ADLs  - Assess for home care needs following discharge   - Consider OT consult to assist with ADL evaluation and planning for discharge  - Provide patient education as appropriate  Outcome: Progressing     Problem: DISCHARGE PLANNING  Goal: Discharge to home or other facility with appropriate resources  Description: INTERVENTIONS:  - Identify barriers to discharge w/patient and caregiver  - Arrange for needed discharge resources and transportation as appropriate  - Identify discharge learning needs (meds, wound care, etc.)  - Arrange for interpretive services to assist at discharge as needed  - Refer to Case Management Department for coordinating discharge planning if the patient needs post-hospital services based on physician/advanced practitioner order or complex needs related to functional status, cognitive ability, or social support system  Outcome: Progressing     Problem: Knowledge Deficit  Goal: Patient/family/caregiver demonstrates understanding of disease process, treatment plan, medications, and discharge instructions  Description: Complete learning assessment and assess knowledge base.  Interventions:  - Provide teaching at level of understanding  - Provide teaching via preferred learning methods  Outcome: Progressing

## 2024-11-28 NOTE — ASSESSMENT & PLAN NOTE
Patient has a cat which resides in her bedroom.  Advised her to try to keep the cat out of her bedroom and deep clean

## 2024-11-28 NOTE — CONSULTS
Consultation - Pulmonology   Name: Lakhwinder Puckett 19 y.o. female I MRN: [id number]  Unit/Bed#: E2 -01 I Date of Admission: 11/28/2024   Date of Service: 11/28/2024 I Hospital Day: 0   Inpatient consult to Pulmonology  Consult performed by: Debi Moeyr PA-C  Consult ordered by: Guy Campbell PA-C        Physician Requesting Evaluation: Jian Mcbride MD   Reason for Evaluation / Principal Problem: Asthma    Assessment & Plan  Severe persistent asthma with acute exacerbation  Arrived to ED overnight required hour-long, epinephrine, Solu-Medrol and magnesium with subsequent improvement in symptoms  Patient is on room air  No significant wheezing on exam currently  Currently on IV Solu-Medrol 40 mg every 6 hours  Could likely transition to oral prednisone, would suggest a slower taper for this patient  Reports compliance with her home Pulmicort nebs and Zyrtec along with Astelin and Flonase nasal sprays  Continue around-the-clock nebs while admitted, will switch to Xopenex given her tachycardia    Peripheral eosinophilia  Patient reports Dupixent was rejected by her insurance.  Hopefully either Nucala or Fasenra would be covered for her.  Following up with [facility]  Environmental allergies  Patient has a cat which resides in her bedroom.  Advised her to try to keep the cat out of her bedroom and deep clean  Anxiety  Thought to be contributing to symptoms, management per primary    Patient follows with [facility] as outpatient. I urged her to keep close follow-up with them.    History of Present Illness   Lakhwinder Puckett is a 19 y.o. female with past medical history of severe persistent asthma who presents with shortness of breath.  She says that she was cleaning the house yesterday in preparation for the holiday and thinks that this triggered her.  Patient reports her breathing was so bad in the ER that she passed out at 1 point however there is no note of this anywhere in her chart. She is feeling  much better right now. Says while sitting in bed she is no longer short of breath and has no chest tightness or wheezing.    Denies smoking or vaping, no exposure to smoke in her home.    Has a cat.    Works at [employer].    Review of Systems   Constitutional:  Negative for fever.   Respiratory:  Positive for cough, chest tightness and shortness of breath.    Neurological:  Positive for syncope.   Psychiatric/Behavioral:  The patient is nervous/anxious.    All other systems reviewed and are negative.      Historical Information   I have reviewed the patient's PMH, PSH, Social History, Family History, Meds, and Allergies  Tobacco History: n/a  Occupational History: [employer]  Family History:  Family History   Problem Relation Age of Onset    Thyroid disease Mother     No Known Problems Father        Objective :  Temp:  [97.9 °F (36.6 °C)-99 °F (37.2 °C)] 97.9 °F (36.6 °C)  HR:  [100-135] 120  BP: ()/(41-64) 118/51  Resp:  [14-21] 16  SpO2:  [92 %-100 %] 98 %  O2 Device: None (Room air)  Nasal Cannula O2 Flow Rate (L/min):  [4 L/min] 4 L/min    Physical Exam  Vitals reviewed.   Constitutional:       General: She is not in acute distress.     Appearance: She is not toxic-appearing.   HENT:      Head: Normocephalic and atraumatic.   Eyes:      General: No scleral icterus.  Cardiovascular:      Rate and Rhythm: Normal rate and regular rhythm.   Pulmonary:      Effort: Pulmonary effort is normal.      Breath sounds: Normal breath sounds.   Musculoskeletal:         General: No signs of injury.   Skin:     General: Skin is warm and dry.   Neurological:      General: No focal deficit present.      Mental Status: She is alert. Mental status is at baseline.   Psychiatric:         Mood and Affect: Mood normal.         Behavior: Behavior normal.           Lab Results: I have reviewed the following results:  .     11/28/24  0122 11/28/24  0453   WBC 21.04*  --    HGB 11.2*  --    HCT 36.8  --    *  --    BANDSPCT 3  --     SODIUM 134* 138   K 3.9 3.4*    109*   CO2 17* 21   BUN 15 13   CREATININE 0.95 0.78   GLUC 254* 128   AST 14  --    ALT 10  --    ALB 3.8  --    TBILI 0.32  --    ALKPHOS 75  --      ABG: No new results in last 24 hours.    Imaging Results Review: I reviewed radiology reports from this admission including: chest xray.  Other Study Results Review: No additional pertinent studies reviewed.      VTE Prophylaxis: Enoxaparin (Lovenox)

## 2024-11-28 NOTE — ASSESSMENT & PLAN NOTE
Patient reports Dupixent was rejected by her insurance.  Hopefully either Nucala or Fasenra would be covered for her.  Following up with [facility]

## 2024-11-28 NOTE — ED PROVIDER NOTES
Time reflects when diagnosis was documented in both MDM as applicable and the Disposition within this note       Time User Action Codes Description Comment    11/28/2024  4:48 AM Inder Mitchell Add [J45.51] Severe persistent asthma with acute exacerbation           ED Disposition       ED Disposition   Admit    Condition   Stable    Date/Time   Thu Nov 28, 2024  3:59 AM    Comment   Case was discussed with ELISSA and the patient's admission status was agreed to be Admission Status: observation status to the service of Dr. Mcbride .               Assessment & Plan       Medical Decision Making  Patient has a history of asthma and was recently admitted.  Patient does have decreased breath sounds with diffuse wheezing.  Oxygen is normal on room air.  Patient did open her eyes when transferred over from the EMS letter to the bed.  She is following commands and is no longer vomiting.  I do not think she needs intubation or BiPAP at this time.  Will start treatment with an hour-long nebulizer, epinephrine, Zofran, Solu-Medrol, IV fluids and will continue with the 2 g of magnesium that was started by EMS.    1:37 AM  VBG shows mild acidosis but does not appear to be respiratory.  Appears more metabolic.  Will add an alcohol level that could explain her lethargy, vomiting and symptoms.    2:27 AM  No major lab abnormalities identified to explain the mild acidosis.  Patient is much improved at this time.  She is sitting up, talking and laughing.  Treatment is almost over.  Will continue with observation for potential repeat nebulizer treatment given her presentation and history.    4:00 AM  Patient reexamined.  Still has wheezing and is feeling chest tightness again.  Pulse ox is 95%.  She was ambulating well to the bathroom with minimal symptoms.  I will give her another nebulizer treatment and given the persistent symptoms, although better, will admit for further treatments and observation.    Amount and/or Complexity of  Data Reviewed  Labs: ordered. Decision-making details documented in ED Course.  Radiology: ordered and independent interpretation performed.    Risk  Prescription drug management.  Decision regarding hospitalization.        ED Course as of 11/28/24 0448   Thu Nov 28, 2024 0131 CBC and differential(!)  Leukocytosis.  Probably stress-induced.  Similar to prior   0133 Blood gas, venous(!!)  Mild acidosis.  Appears metabolic.  Will add an alcohol level   0205 Ethanol  Negative    0205 Quantitative hCG  Not pregnant    0206 Comprehensive metabolic panel(!)  Hyperglycemia but anion gap is normal.  Do not think this is DKA.       Medications   albuterol inhalation solution 10 mg (10 mg Nebulization Given 11/28/24 0126)   ipratropium (ATROVENT) 0.02 % inhalation solution 1 mg (1 mg Nebulization Given 11/28/24 0126)   sodium chloride 0.9 % inhalation solution 12 mL (12 mL Nebulization Given 11/28/24 0125)   methylPREDNISolone sodium succinate (Solu-MEDROL) injection 125 mg (125 mg Intravenous Given 11/28/24 0131)   EPINEPHrine PF (ADRENALIN) 1 mg/mL injection 0.3 mg (0.3 mg Intramuscular Given 11/28/24 0138)   sodium chloride 0.9 % bolus 1,000 mL (0 mL Intravenous Stopped 11/28/24 0301)   ondansetron (ZOFRAN) injection 4 mg (4 mg Intravenous Given 11/28/24 0128)   magnesium sulfate (FOR EMS ONLY) 2 g/50 mL IVPB (premix) 2 g (0 g Does not apply Given to EMS 11/28/24 0147)   ipratropium-albuterol (FOR EMS ONLY) (DUO-NEB) 0.5-2.5 mg/3 mL inhalation solution 6 mL (0 mL Does not apply Given to EMS 11/28/24 0147)   ipratropium-albuterol (FOR EMS ONLY) (DUO-NEB) 0.5-2.5 mg/3 mL inhalation solution 6 mL (0 mL Does not apply Given to EMS 11/28/24 0147)   lactated ringers bolus 1,000 mL (0 mL Intravenous Stopped 11/28/24 0446)   albuterol inhalation solution 10 mg (10 mg Nebulization Given 11/28/24 0409)   ipratropium (ATROVENT) 0.02 % inhalation solution 1 mg (1 mg Nebulization Given 11/28/24 8230)   sodium chloride 0.9 %  "inhalation solution 12 mL (12 mL Nebulization Given 11/28/24 0408)       ED Risk Strat Scores             CRAFFT      Flowsheet Row Most Recent Value   CRAFFT Initial Screen: During the past 12 months, did you:    1. Drink any alcohol (more than a few sips)?  No Filed at: 11/28/2024 0151   2. Smoke any marijuana or hashish No Filed at: 11/28/2024 0151   3. Use anything else to get high? (\"anything else\" includes illegal drugs, over the counter and prescription drugs, and things that you sniff or 'patiño')? No Filed at: 11/28/2024 0151                                          History of Present Illness       Chief Complaint   Patient presents with    Respiratory Distress     Pt arrived via EMS. Per EMS low spo2 with little air movement in lungs, EMS gave GCS of 12. EMS gave 3 duo neb breathing tx and 2g mag IV. Pt hx of asthma.       Past Medical History:   Diagnosis Date    Asthma     Leukocytosis 6/21/2022    SIRS (systemic inflammatory response syndrome) (HCC) 12/16/2023      History reviewed. No pertinent surgical history.   Family History   Problem Relation Age of Onset    Thyroid disease Mother     No Known Problems Father       Social History     Tobacco Use    Smoking status: Never    Smokeless tobacco: Never   Vaping Use    Vaping status: Never Used   Substance Use Topics    Alcohol use: Not Currently    Drug use: Not Currently      E-Cigarette/Vaping    E-Cigarette Use Never User       E-Cigarette/Vaping Substances    Nicotine No     THC No     CBD No     Flavoring No     Other No     Unknown No       I have reviewed and agree with the history as documented.     History limited due to acuity of condition.  History provided by EMS.  EMS states that patient has a history of asthma and they were called for a severe asthma exacerbation.  When they arrived the patient was lethargic with a pulse ox in the 50s.  Patient was started on nebulizer treatments and was given IV magnesium but then vomited just prior to " coming into the emergency department.  Patient did turn her head and did not aspirate.  Patient now on room air but remains lethargic but is able to tell us who she is and where she is.      History provided by:  EMS personnel  History limited by:  Acuity of condition   used: No        Review of Systems   Unable to perform ROS: Acuity of condition           Objective       ED Triage Vitals   Temperature Pulse Blood Pressure Respirations SpO2 Patient Position - Orthostatic VS   11/28/24 0120 11/28/24 0120 11/28/24 0120 11/28/24 0120 11/28/24 0120 11/28/24 0150   99 °F (37.2 °C) (!) 135 113/64 16 92 % Lying      Temp Source Heart Rate Source BP Location FiO2 (%) Pain Score    11/28/24 0120 11/28/24 0120 11/28/24 0150 -- --    Oral Monitor Left arm        Vitals      Date and Time Temp Pulse SpO2 Resp BP Pain Score FACES Pain Rating User   11/28/24 0415 -- 100 100 % 16 117/55 -- -- MA   11/28/24 0409 -- -- 97 % -- -- -- -- [facility]   11/28/24 0400 -- 113 97 % 20 100/48 -- -- MA   11/28/24 0345 -- 109 95 % 21 85/41 -- --    11/28/24 0230 -- 133 100 % 20 104/50 -- --    11/28/24 0150 -- 123 100 % 14 99/50 -- -- MA   11/28/24 0127 -- -- 97 % -- -- -- -- R   11/28/24 0120 99 °F (37.2 °C) 135 92 % 16 113/64 -- -- MA            Physical Exam  Vitals and nursing note reviewed.   Constitutional:       General: She is not in acute distress.     Appearance: She is well-developed. She is ill-appearing. She is not diaphoretic.   HENT:      Head: Normocephalic and atraumatic.      Right Ear: External ear normal.      Left Ear: External ear normal.      Nose: Nose normal. No congestion or rhinorrhea.   Eyes:      General: No scleral icterus.        Right eye: No discharge.         Left eye: No discharge.      Conjunctiva/sclera: Conjunctivae normal.      Pupils: Pupils are equal, round, and reactive to light.   Neck:      Trachea: No tracheal deviation.   Cardiovascular:      Rate and Rhythm: Normal rate and  regular rhythm.      Heart sounds: Normal heart sounds. No murmur heard.     No friction rub.   Pulmonary:      Effort: Pulmonary effort is normal. No tachypnea, accessory muscle usage or respiratory distress.      Breath sounds: Decreased air movement present. No stridor. Decreased breath sounds and wheezing present. No rales.   Abdominal:      General: There is no distension.      Palpations: Abdomen is soft.      Tenderness: There is no abdominal tenderness. There is no guarding or rebound.   Musculoskeletal:         General: No tenderness or deformity. Normal range of motion.      Cervical back: Normal range of motion and neck supple.      Right lower leg: No edema.      Left lower leg: No edema.   Skin:     General: Skin is warm and dry.   Neurological:      Mental Status: She is lethargic. She is not disoriented.      GCS: GCS eye subscore is 4. GCS verbal subscore is 5. GCS motor subscore is 6.   Psychiatric:         Speech: Speech normal.         Behavior: Behavior normal.         Results Reviewed       Procedure Component Value Units Date/Time    Basic metabolic panel [[id number]]     Lab Status: No result Specimen: Blood     Blood gas, venous [[id number]]     Lab Status: No result Specimen: Blood     Ethanol [[id number]]  (Normal) Collected: 11/28/24 0136    Lab Status: Final result Specimen: Blood from Arm, Right Updated: 11/28/24 0201     Ethanol Lvl <10 mg/dL     Quantitative hCG [[id number]]  (Normal) Collected: 11/28/24 0122    Lab Status: Final result Specimen: Blood from Arm, Right Updated: 11/28/24 0151     HCG, Quant <0.6 mIU/mL     Narrative:       Expected Ranges:    HCG results between 5.0 and 25.0 mIU/mL may be indicative of early pregnancy but should be interpreted in light of the total clinical presentation.    HCG can rise to detectable levels in uyen and post menopausal women (0-11.6 mIU/mL).     Approximate               Approximate HCG  Gestation age          Concentration (  mIU/mL)  _____________          ______________________   Weeks                      HCG values  0.2-1                       5-50  1-2                           2-3                         100-5000  3-4                         500-81421  4-5                         1000-74336  5-6                         06022-016246  6-8                         92620-071297  8-12                        [id number]      CBC and differential [[id number]]  (Abnormal) Collected: 11/28/24 0122    Lab Status: Final result Specimen: Blood from Arm, Right Updated: 11/28/24 0148     WBC 21.04 Thousand/uL      RBC 4.10 Million/uL      Hemoglobin 11.2 g/dL      Hematocrit 36.8 %      MCV 90 fL      MCH 27.3 pg      MCHC 30.4 g/dL      RDW 15.7 %      MPV 10.6 fL      Platelets 452 Thousands/uL     Narrative:      This is an appended report.  These results have been appended to a previously verified report.    Manual Differential(PHLEBS Do Not Order) [[id number]] Collected: 11/28/24 0122    Lab Status: In process Specimen: Blood from Arm, Right Updated: 11/28/24 0148    Comprehensive metabolic panel [[id number]]  (Abnormal) Collected: 11/28/24 0122    Lab Status: Final result Specimen: Blood from Arm, Right Updated: 11/28/24 0146     Sodium 134 mmol/L      Potassium 3.9 mmol/L      Chloride 104 mmol/L      CO2 17 mmol/L      ANION GAP 13 mmol/L      BUN 15 mg/dL      Creatinine 0.95 mg/dL      Glucose 254 mg/dL      Calcium 8.9 mg/dL      AST 14 U/L      ALT 10 U/L      Alkaline Phosphatase 75 U/L      Total Protein 7.2 g/dL      Albumin 3.8 g/dL      Total Bilirubin 0.32 mg/dL      eGFR 86 ml/min/1.73sq m     Narrative:      National Kid[employer] Foundation guidelines for Chronic Kidney Disease (CKD):     Stage 1 with normal or high GFR (GFR > 90 mL/min/1.73 square meters)    Stage 2 Mild CKD (GFR = 60-89 mL/min/1.73 square meters)    Stage 3A Moderate CKD (GFR = 45-59 mL/min/1.73 square meters)    Stage 3B Moderate CKD (GFR = 30-44  mL/min/1.73 square meters)    Stage 4 Severe CKD (GFR = 15-29 mL/min/1.73 square meters)    Stage 5 End Stage CKD (GFR <15 mL/min/1.73 square meters)  Note: GFR calculation is accurate only with a steady state creatinine    Blood gas, venous [[id number]]  (Abnormal) Collected: 11/28/24 0122    Lab Status: Final result Specimen: Blood from Arm, Right Updated: 11/28/24 0131     pH, Wilner 7.138     pCO2, Wilner 43.0 mm Hg      pO2, Wilner 75.8 mm Hg      HCO3, Wilner 14.2 mmol/L      Base Excess, Wilner -14.2 mmol/L      O2 Content, Wilner 15.4 ml/dL      O2 HGB, VENOUS 89.6 %             X-ray chest 1 view portable   ED Interpretation by Inder Mitchell Jr., DO (11/28 0146)   The CXR was ordered by myself and interpreted by me independently.  On my read, it appears without acute abnormalities:  - The  cardiomediastinal  silhouette  is  unremarkable.    - The  lungs  are  clear.  - No  pleural  effusions.  - No  pneumothorax.  - The  pulmonary  vasculature  is  within  normal  limits.    - The  trachea  is  midline.    - Bony  thorax  is  unremarkable.      - Similar to previous from 10/30/24              ECG 12 Lead Documentation Only    Date/Time: 11/28/2024 1:26 AM    Performed by: Inder Mitchell Jr., DO  Authorized by: Inder Mitchell Jr., DO    Indications / Diagnosis:  Sob  ECG reviewed by me, the ED Provider: yes    Patient location:  ED  Previous ECG:     Previous ECG:  Compared to current    Similarity:  No change  Interpretation:     Interpretation: non-specific    Rate:     ECG rate:  132    ECG rate assessment: tachycardic    Rhythm:     Rhythm: sinus rhythm    Ectopy:     Ectopy: none    QRS:     QRS intervals:  Normal  Conduction:     Conduction: normal    ST segments:     ST segments:  Non-specific  T waves:     T waves: normal    CriticalCare Time    Date/Time: 11/28/2024 3:59 AM    Performed by: Inder Mitchell Jr., DO  Authorized by: Inder Mitchell Jr., DO    Critical care provider statement:     Critical  care time (minutes):  60    Critical care time was exclusive of:  Separately billable procedures and treating other patients and teaching time    Critical care was necessary to treat or prevent imminent or life-threatening deterioration of the following conditions:  Respiratory failure    Critical care was time spent personally by me on the following activities:  Blood draw for specimens, obtaining history from patient or surrogate, development of treatment plan with patient or surrogate, discussions with consultants, evaluation of patient's response to treatment, examination of patient, interpretation of cardiac output measurements, ordering and performing treatments and interventions, ordering and review of laboratory studies, ordering and review of radiographic studies, review of old charts and re-evaluation of patient's condition    I assumed direction of critical care for this patient from another provider in my specialty: no        ED Medication and Procedure Management   Prior to Admission Medications   Prescriptions Last Dose Informant Patient Reported? Taking?   albuterol (2.5 mg/3 mL) 0.083 % nebulizer solution 2024  No Yes   Sig: Take 3 mL (2.5 mg total) by nebulization every 6 (six) hours as needed for wheezing or shortness of breath   albuterol (PROVENTIL HFA,VENTOLIN HFA) 90 mcg/act inhaler 2024  No Yes   Sig: Inhale 2 puffs 4 (four) times a day   azelastine (ASTELIN) 0.1 % nasal spray 2024  No Yes   Si spray into each nostril 2 (two) times a day Use in each nostril as directed   budesonide (PULMICORT) 0.5 mg/2 mL nebulizer solution Not Taking  No No   Sig: TAKE 2 ML (0.5 MG TOTAL) BY NEBULIZATION TWICE A DAY RINSE MOUTH AFTER USE   Patient not taking: Reported on 2024   cetirizine (ZyrTEC) 10 mg tablet Not Taking  No No   Sig: Take 1 tablet (10 mg total) by mouth daily   Patient not taking: Reported on 2024   dupilumab (DUPIXENT) subcutaneous injection   No No   Sig:  Inject 4 mL (600 mg total) under the skin once for 1 dose   dupilumab (DUPIXENT) subcutaneous injection   No No   Sig: Inject 2 mL (300 mg total) under the skin every 14 (fourteen) days for 4 days, THEN 4 mL (600 mg total) every 14 (fourteen) days for 1 day.   escitalopram (LEXAPRO) 10 mg tablet Not Taking  No No   Sig: Take 1 tablet (10 mg total) by mouth daily   Patient not taking: Reported on 2024   fluticasone (FLONASE) 50 mcg/act nasal spray Not Taking  Yes No   Si spray into each nostril daily   Patient not taking: Reported on 2024   hydrOXYzine HCL (ATARAX) 25 mg tablet 2024  No Yes   Sig: Take 1 tablet (25 mg total) by mouth every 6 (six) hours as needed for anxiety   ipratropium-albuterol (DUO-NEB) 0.5-2.5 mg/3 mL nebulizer solution 2024  No Yes   Sig: Take 3 mL by nebulization 4 (four) times a day   loratadine (CLARITIN) 10 mg tablet 2024  Yes Yes   Sig: Take 10 mg by mouth daily   predniSONE 20 mg tablet 2024  Yes Yes   Sig: Take 20 mg by mouth if needed (As needed)      Facility-Administered Medications: None     Patient's Medications   Discharge Prescriptions    No medications on file     No discharge procedures on file.  ED SEPSIS DOCUMENTATION   Time reflects when diagnosis was documented in both MDM as applicable and the Disposition within this note       Time User Action Codes Description Comment    2024  4:48 AM Inder Mitchell Add [J45.51] Severe persistent asthma with acute exacerbation                  Inder Mitchell Jr., DO  24 0401       Inder Mitchell Jr., DO  24 0448

## 2024-11-29 ENCOUNTER — APPOINTMENT (OUTPATIENT)
Dept: NON INVASIVE DIAGNOSTICS | Facility: HOSPITAL | Age: 19
DRG: 202 | End: 2024-11-29
Payer: COMMERCIAL

## 2024-11-29 LAB
ANION GAP SERPL CALCULATED.3IONS-SCNC: 7 MMOL/L (ref 4–13)
AORTIC ROOT: 2.5 CM
AORTIC VALVE MEAN VELOCITY: 7.6 M/S
APICAL FOUR CHAMBER EJECTION FRACTION: 63 %
ASCENDING AORTA: 2.4 CM
AV AREA BY CONTINUOUS VTI: 2.2 CM2
AV AREA PEAK VELOCITY: 2.3 CM2
AV LVOT MEAN GRADIENT: 1 MMHG
AV LVOT PEAK GRADIENT: 2 MMHG
AV MEAN GRADIENT: 3 MMHG
AV PEAK GRADIENT: 5 MMHG
AV VALVE AREA: 2.17 CM2
AV VELOCITY RATIO: 0.69
BSA FOR ECHO PROCEDURE: 1.95 M2
BUN SERPL-MCNC: 9 MG/DL (ref 5–25)
CALCIUM SERPL-MCNC: 8.9 MG/DL (ref 8.4–10.2)
CHLORIDE SERPL-SCNC: 110 MMOL/L (ref 96–108)
CO2 SERPL-SCNC: 20 MMOL/L (ref 21–32)
CREAT SERPL-MCNC: 0.69 MG/DL (ref 0.6–1.3)
D DIMER PPP FEU-MCNC: 0.33 UG/ML FEU
DOP CALC AO PEAK VEL: 1.12 M/S
DOP CALC AO VTI: 24.24 CM
DOP CALC LVOT AREA: 3.14 CM2
DOP CALC LVOT CARDIAC INDEX: 1.82 L/MIN/M2
DOP CALC LVOT CARDIAC OUTPUT: 3.55 L/MIN
DOP CALC LVOT DIAMETER: 2 CM
DOP CALC LVOT PEAK VEL VTI: 16.75 CM
DOP CALC LVOT PEAK VEL: 0.77 M/S
DOP CALC LVOT STROKE INDEX: 26.7 ML/M2
DOP CALC LVOT STROKE VOLUME: 52.6
E WAVE DECELERATION TIME: 174 MS
E/A RATIO: 1.64
ERYTHROCYTE [DISTWIDTH] IN BLOOD BY AUTOMATED COUNT: 16.4 % (ref 11.6–15.1)
FLUAV RNA RESP QL NAA+PROBE: NEGATIVE
FLUBV RNA RESP QL NAA+PROBE: NEGATIVE
FRACTIONAL SHORTENING: 33 (ref 28–44)
GFR SERPL CREATININE-BSD FRML MDRD: 125 ML/MIN/1.73SQ M
GLUCOSE P FAST SERPL-MCNC: 121 MG/DL (ref 65–99)
GLUCOSE SERPL-MCNC: 121 MG/DL (ref 65–140)
HCT VFR BLD AUTO: 33.5 % (ref 34.8–46.1)
HGB BLD-MCNC: 10.5 G/DL (ref 11.5–15.4)
INTERVENTRICULAR SEPTUM IN DIASTOLE (PARASTERNAL SHORT AXIS VIEW): 0.8 CM
INTERVENTRICULAR SEPTUM: 0.8 CM (ref 0.6–1.1)
LAAS-AP2: 16 CM2
LAAS-AP4: 13.6 CM2
LEFT ATRIUM SIZE: 3.4 CM
LEFT ATRIUM VOLUME (MOD BIPLANE): 39 ML
LEFT ATRIUM VOLUME INDEX (MOD BIPLANE): 20 ML/M2
LEFT INTERNAL DIMENSION IN SYSTOLE: 2.8 CM (ref 2.1–4)
LEFT VENTRICULAR INTERNAL DIMENSION IN DIASTOLE: 4.2 CM (ref 3.5–6)
LEFT VENTRICULAR POSTERIOR WALL IN END DIASTOLE: 0.8 CM
LEFT VENTRICULAR STROKE VOLUME: 48 ML
LVSV (TEICH): 48 ML
MCH RBC QN AUTO: 28.2 PG (ref 26.8–34.3)
MCHC RBC AUTO-ENTMCNC: 31.3 G/DL (ref 31.4–37.4)
MCV RBC AUTO: 90 FL (ref 82–98)
MV E'TISSUE VEL-LAT: 17 CM/S
MV E'TISSUE VEL-SEP: 12 CM/S
MV PEAK A VEL: 0.66 M/S
MV PEAK E VEL: 108 CM/S
PLATELET # BLD AUTO: 353 THOUSANDS/UL (ref 149–390)
PMV BLD AUTO: 10.7 FL (ref 8.9–12.7)
POTASSIUM SERPL-SCNC: 4.5 MMOL/L (ref 3.5–5.3)
PROCALCITONIN SERPL-MCNC: 0.42 NG/ML
RA PRESSURE ESTIMATED: 3 MMHG
RBC # BLD AUTO: 3.73 MILLION/UL (ref 3.81–5.12)
RIGHT ATRIAL 2D VOLUME: 34 ML
RIGHT ATRIUM AREA SYSTOLE A4C: 13.8 CM2
RIGHT VENTRICLE ID DIMENSION: 3 CM
RSV RNA RESP QL NAA+PROBE: NEGATIVE
SARS-COV-2 RNA RESP QL NAA+PROBE: NEGATIVE
SL CV LEFT ATRIUM LENGTH A2C: 4.9 CM
SL CV LV EF: 65
SL CV PED ECHO LEFT VENTRICLE DIASTOLIC VOLUME (MOD BIPLANE) 2D: 78 ML
SL CV PED ECHO LEFT VENTRICLE SYSTOLIC VOLUME (MOD BIPLANE) 2D: 30 ML
SODIUM SERPL-SCNC: 137 MMOL/L (ref 135–147)
TRICUSPID ANNULAR PLANE SYSTOLIC EXCURSION: 2.6 CM
TSH SERPL DL<=0.05 MIU/L-ACNC: 0.66 UIU/ML (ref 0.45–4.5)
WBC # BLD AUTO: 22.55 THOUSAND/UL (ref 4.31–10.16)

## 2024-11-29 PROCEDURE — 94762 N-INVAS EAR/PLS OXIMTRY CONT: CPT

## 2024-11-29 PROCEDURE — 94760 N-INVAS EAR/PLS OXIMETRY 1: CPT

## 2024-11-29 PROCEDURE — 99232 SBSQ HOSP IP/OBS MODERATE 35: CPT

## 2024-11-29 PROCEDURE — 84145 PROCALCITONIN (PCT): CPT

## 2024-11-29 PROCEDURE — 85027 COMPLETE CBC AUTOMATED: CPT | Performed by: STUDENT IN AN ORGANIZED HEALTH CARE EDUCATION/TRAINING PROGRAM

## 2024-11-29 PROCEDURE — 99232 SBSQ HOSP IP/OBS MODERATE 35: CPT | Performed by: INTERNAL MEDICINE

## 2024-11-29 PROCEDURE — 93306 TTE W/DOPPLER COMPLETE: CPT

## 2024-11-29 PROCEDURE — 84443 ASSAY THYROID STIM HORMONE: CPT

## 2024-11-29 PROCEDURE — 0241U HB NFCT DS VIR RESP RNA 4 TRGT: CPT

## 2024-11-29 PROCEDURE — 94640 AIRWAY INHALATION TREATMENT: CPT

## 2024-11-29 PROCEDURE — 80048 BASIC METABOLIC PNL TOTAL CA: CPT | Performed by: STUDENT IN AN ORGANIZED HEALTH CARE EDUCATION/TRAINING PROGRAM

## 2024-11-29 PROCEDURE — 85379 FIBRIN DEGRADATION QUANT: CPT

## 2024-11-29 RX ORDER — FLUTICASONE PROPIONATE 50 MCG
2 SPRAY, SUSPENSION (ML) NASAL DAILY
Status: DISCONTINUED | OUTPATIENT
Start: 2024-11-29 | End: 2024-12-01 | Stop reason: HOSPADM

## 2024-11-29 RX ORDER — AZELASTINE 1 MG/ML
1 SPRAY, METERED NASAL 2 TIMES DAILY
Status: DISCONTINUED | OUTPATIENT
Start: 2024-11-29 | End: 2024-12-01 | Stop reason: HOSPADM

## 2024-11-29 RX ORDER — ALBUTEROL SULFATE 90 UG/1
2 INHALANT RESPIRATORY (INHALATION) 4 TIMES DAILY
Qty: 18 G | Refills: 0 | Status: SHIPPED | OUTPATIENT
Start: 2024-11-29

## 2024-11-29 RX ORDER — HYDROXYZINE HYDROCHLORIDE 25 MG/1
25 TABLET, FILM COATED ORAL EVERY 6 HOURS PRN
Status: DISCONTINUED | OUTPATIENT
Start: 2024-11-29 | End: 2024-12-01 | Stop reason: HOSPADM

## 2024-11-29 RX ORDER — METHYLPREDNISOLONE SODIUM SUCCINATE 40 MG/ML
40 INJECTION, POWDER, LYOPHILIZED, FOR SOLUTION INTRAMUSCULAR; INTRAVENOUS EVERY 12 HOURS SCHEDULED
Status: COMPLETED | OUTPATIENT
Start: 2024-11-29 | End: 2024-11-29

## 2024-11-29 RX ADMIN — FLUTICASONE FUROATE AND VILANTEROL TRIFENATATE 1 PUFF: 200; 25 POWDER RESPIRATORY (INHALATION) at 08:48

## 2024-11-29 RX ADMIN — METHYLPREDNISOLONE SODIUM SUCCINATE 40 MG: 40 INJECTION, POWDER, FOR SOLUTION INTRAMUSCULAR; INTRAVENOUS at 22:00

## 2024-11-29 RX ADMIN — LEVALBUTEROL HYDROCHLORIDE 1.25 MG: 1.25 SOLUTION RESPIRATORY (INHALATION) at 14:20

## 2024-11-29 RX ADMIN — FLUTICASONE PROPIONATE 2 SPRAY: 50 SPRAY, METERED NASAL at 13:44

## 2024-11-29 RX ADMIN — UMECLIDINIUM 1 PUFF: 62.5 AEROSOL, POWDER ORAL at 08:48

## 2024-11-29 RX ADMIN — ENOXAPARIN SODIUM 40 MG: 40 INJECTION SUBCUTANEOUS at 08:47

## 2024-11-29 RX ADMIN — LEVALBUTEROL HYDROCHLORIDE 1.25 MG: 1.25 SOLUTION RESPIRATORY (INHALATION) at 07:00

## 2024-11-29 RX ADMIN — LEVALBUTEROL HYDROCHLORIDE 1.25 MG: 1.25 SOLUTION RESPIRATORY (INHALATION) at 01:41

## 2024-11-29 RX ADMIN — PREDNISONE 40 MG: 20 TABLET ORAL at 08:47

## 2024-11-29 RX ADMIN — IPRATROPIUM BROMIDE 0.5 MG: 0.5 SOLUTION RESPIRATORY (INHALATION) at 14:20

## 2024-11-29 RX ADMIN — IPRATROPIUM BROMIDE 0.5 MG: 0.5 SOLUTION RESPIRATORY (INHALATION) at 19:37

## 2024-11-29 RX ADMIN — LEVALBUTEROL HYDROCHLORIDE 1.25 MG: 1.25 SOLUTION RESPIRATORY (INHALATION) at 19:37

## 2024-11-29 RX ADMIN — ESCITALOPRAM OXALATE 10 MG: 10 TABLET ORAL at 08:47

## 2024-11-29 NOTE — ASSESSMENT & PLAN NOTE
Notes she was taking a nebulizer treatment then blacked out  She is unclear if her anxiety was playing a role in this  Suspect this is in setting of hyperventilation with hypoxemia, although patient does have persistent tachycardia  Check TSH, D-dimer, if elevated will obtain CTA PE  Will monitor on telemetry and obtain echocardiogram

## 2024-11-29 NOTE — ASSESSMENT & PLAN NOTE
-Arrived to ED overnight required hour-long, epinephrine, Solu-Medrol and magnesium with subsequent improvement in symptoms  -PFTs January 2024  FEV1 111%, %, FEV1/FVC 77, %, %, DLCO 97% no obstruction or restriction. Normal diffusion capacity.   -Home medication regime: Trelegy 200-62.5 -25 mcg 1 puff daily, DuoNebs every 4 hours as needed, levalbuterol 2 puffs every 6 hours as needed, Zyrtec 10 mg daily, Flonase 1 spray each nostril daily, Claritin daily  -Follows with [facility] last seen in the office 11/15/2024  -Currently on prednisone 40 mg, increased wheezing noted this morning on exam may consider transitioning back to IV Solu-Medrol if wheezing does not improve with nebulizers  -Continue Xopenex nebulizers 3 times daily and Breo and Incruse while inpatient-can switch inhalers to all nebulized medication if patient does not continue to improve

## 2024-11-29 NOTE — PROGRESS NOTES
Progress Note - Pulmonology   Name: Lakhwinder Puckett 19 y.o. female I MRN: [id number]  Unit/Bed#: E2 -01 I Date of Admission: 11/28/2024   Date of Service: 11/29/2024 I Hospital Day: 0    Assessment & Plan  Severe persistent asthma with acute exacerbation  -Arrived to ED overnight required hour-long, epinephrine, Solu-Medrol and magnesium with subsequent improvement in symptoms  -PFTs January 2024  FEV1 111%, %, FEV1/FVC 77, %, %, DLCO 97% no obstruction or restriction. Normal diffusion capacity.   -Home medication regime: Trelegy 200-62.5 -25 mcg 1 puff daily, DuoNebs every 4 hours as needed, levalbuterol 2 puffs every 6 hours as needed, Zyrtec 10 mg daily, Flonase 1 spray each nostril daily, Claritin daily  -Follows with [facility] last seen in the office 11/15/2024  -Currently on prednisone 40 mg, increased wheezing noted this morning on exam may consider transitioning back to IV Solu-Medrol if wheezing does not improve with nebulizers  -Continue Xopenex nebulizers 3 times daily and Breo and Incruse while inpatient-can switch inhalers to all nebulized medication if patient does not continue to improve    Peripheral eosinophilia  -No current eosinophilia  -Patient reports Dupixent was rejected by her insurance.  Hopefully either Nucala or Fasenra would be covered for her.  Following up with [facility]  Environmental allergies  -Patient has a cat which resides in her bedroom.  Advised her to try to keep the cat out of her bedroom and deep clean  Anxiety  -Thought to be contributing to symptoms, management per primary    24 Hour Events : No overnight events  Subjective : Patient seen and examined at bedside.  Found resting comfortably in bed.  Increasing wheezing noted since yesterday.  Otherwise saturations stable on room air.  Patient denies any fever chills night sweats chest pain or hemoptysis    Objective :  Temp:  [97.1 °F (36.2 °C)-97.5 °F (36.4 °C)] 97.1 °F (36.2 °C)  HR:  [] 81  BP:  (111-139)/(57-66) 139/57  Resp:  [16-18] 18  SpO2:  [94 %-99 %] 97 %  O2 Device: None (Room air)    Physical Exam  Vitals reviewed.   Constitutional:       General: She is not in acute distress.     Appearance: She is obese.   HENT:      Head: Normocephalic and atraumatic.      Right Ear: External ear normal.      Left Ear: External ear normal.      Nose: Nose normal.      Mouth/Throat:      Mouth: Mucous membranes are moist.      Pharynx: Oropharynx is clear.   Eyes:      Extraocular Movements: Extraocular movements intact.      Pupils: Pupils are equal, round, and reactive to light.   Cardiovascular:      Rate and Rhythm: Normal rate and regular rhythm.      Pulses: Normal pulses.      Heart sounds: Normal heart sounds.   Pulmonary:      Effort: Pulmonary effort is normal.      Breath sounds: Wheezing present.      Comments: Inspiratory and expiratory wheezes present  Abdominal:      General: Bowel sounds are normal.      Tenderness: There is no abdominal tenderness.   Musculoskeletal:         General: Normal range of motion.      Cervical back: Normal range of motion and neck supple.      Right lower leg: No edema.      Left lower leg: No edema.   Skin:     General: Skin is warm and dry.   Neurological:      Mental Status: She is alert and oriented to person, place, and time.   Psychiatric:         Mood and Affect: Mood normal.         Behavior: Behavior normal.         Thought Content: Thought content normal.         Judgment: Judgment normal.           Lab Results: I have reviewed the following results:   .     11/29/24  0505   WBC 22.55*   HGB 10.5*   HCT 33.5*      SODIUM 137   K 4.5   *   CO2 20*   BUN 9   CREATININE 0.69   GLUC 121     ABG: No new results in last 24 hours.    Imaging Results Review: I reviewed radiology reports from this admission including: chest xray.  Chest x-ray 11/28/2024 shows no acute cardiopulmonary disease  Other Study Results Review: Other studies reviewed include:  ECHO  TTE completed at [facility] 06/02/2022 shows EF 60 to 65% systolic function normal diastolic function normal, wall motion within normal limits  PFT Results Reviewed: reviewed  PFTs June 2022  FEV1 90%, %, FEV1/FVC 59, %, %, DLCO 89%  Mild obstructive airway disease with significant bronchodilator response in FEV1.  PFTs January 2024  FEV1 111%, %, FEV1/FVC 77, %, %, DLCO 97% no obstruction or restriction. Normal diffusion capacity.

## 2024-11-29 NOTE — UTILIZATION REVIEW
Initial Clinical Review    Admission: Date/Time/Statement:   Admission Orders (From admission, onward)       Ordered        11/28/24 0436  Place in Observation  Once                          Orders Placed This Encounter   Procedures    Place in Observation     Standing Status:   Standing     Number of Occurrences:   1     Level of Care:   Med Surg [16]     ED Arrival Information       Expected   -    Arrival   11/28/2024 01:14    Acuity   Emergent              Means of arrival   Ambulance    Escorted by   [facility] ([facility])    Service   Hospitalist    Admission type   Emergency              Arrival complaint   SOB             Chief Complaint   Patient presents with    Respiratory Distress     Pt arrived via EMS. Per EMS low spo2 with little air movement in lungs, EMS gave GCS of 12. EMS gave 3 duo neb breathing tx and 2g mag IV. Pt hx of asthma.       Initial Presentation: 19 y.o. female presents to ED via  EMS  from home stating breathing has  not  felt right   over the course of the day.  Used nebs  w/o relief.  Felt worse as the day progressed.  PMH  is asthma.   Admit  Observation with  Acute  exacerbation  Asthma  and plan is  IV  s/medrol, Duoneb, pulmonary consult and  s/p  1 hour neb in ED.    Pulmonary consult  Hypoxic  on EMS arrival, sats   in the  50's, given  IV  MG  and duoneb.      Symptoms triggered with exposure to cleaning  chemicals.   Continue  oral steroids.     Anticipated Length of Stay/Certification Statement:  Patient will be admitted on an observation basis with an anticipated length of stay of less than 2 midnights secondary to asthma.     11/29  Still with chest tightness, wheezing,  Coughing  clear mucous.     Coarse breath sounds,  E  wheeze all lungs.   Sats  adequate  RA.  Adding  IV  s/medrol for this  evening dose.    ED Treatment-Medication Administration from 11/28/2024 0114 to 11/28/2024 0536         Date/Time Order Dose Route Action     11/28/2024 0126 albuterol  inhalation solution 10 mg 10 mg Nebulization Given     11/28/2024 0126 ipratropium (ATROVENT) 0.02 % inhalation solution 1 mg 1 mg Nebulization Given     11/28/2024 0125 sodium chloride 0.9 % inhalation solution 12 mL 12 mL Nebulization Given     11/28/2024 0131 methylPREDNISolone sodium succinate (Solu-MEDROL) injection 125 mg 125 mg Intravenous Given     11/28/2024 0138 EPINEPHrine PF (ADRENALIN) 1 mg/mL injection 0.3 mg 0.3 mg Intramuscular Given     11/28/2024 0128 sodium chloride 0.9 % bolus 1,000 mL 1,000 mL Intravenous New Bag     11/28/2024 0128 ondansetron (ZOFRAN) injection 4 mg 4 mg Intravenous Given     11/28/2024 0147 magnesium sulfate (FOR EMS ONLY) 2 g/50 mL IVPB (premix) 2 g 0 g Does not apply Given to EMS     11/28/2024 0147 ipratropium-albuterol (FOR EMS ONLY) (DUO-NEB) 0.5-2.5 mg/3 mL inhalation solution 6 mL 0 mL Does not apply Given to EMS     11/28/2024 0147 ipratropium-albuterol (FOR EMS ONLY) (DUO-NEB) 0.5-2.5 mg/3 mL inhalation solution 6 mL 0 mL Does not apply Given to EMS     11/28/2024 0345 lactated ringers bolus 1,000 mL 1,000 mL Intravenous New Bag     11/28/2024 0409 albuterol inhalation solution 10 mg 10 mg Nebulization Given     11/28/2024 0409 ipratropium (ATROVENT) 0.02 % inhalation solution 1 mg 1 mg Nebulization Given     11/28/2024 0408 sodium chloride 0.9 % inhalation solution 12 mL 12 mL Nebulization Given            Scheduled Medications:  enoxaparin, 40 mg, Subcutaneous, Daily  escitalopram, 10 mg, Oral, Daily  fluticasone-vilanterol, 1 puff, Inhalation, Daily  levalbuterol, 1.25 mg, Nebulization, TID  predniSONE, 40 mg, Oral, Daily  umeclidinium, 1 puff, Inhalation, Daily  IV  s/medrol  ( 11/29)        Continuous IV Infusions:     PRN Meds:  acetaminophen, 650 mg, Oral, Q6H PRN  aluminum-magnesium hydroxide-simethicone, 30 mL, Oral, Q6H PRN  guaiFENesin, 200 mg, Oral, Q4H PRN  levalbuterol, 1.25 mg, Nebulization, Q6H PRN  ondansetron, 4 mg, Intravenous, Q6H  PRN  polyethylene glycol, 17 g, Oral, Daily PRN      ED Triage Vitals   Temperature Pulse Respirations Blood Pressure SpO2 Pain Score   11/28/24 0120 11/28/24 0120 11/28/24 0120 11/28/24 0120 11/28/24 0120 11/28/24 0545   99 °F (37.2 °C) (!) 135 16 113/64 92 % No Pain     Weight (last 2 days)       Date/Time Weight    11/28/24 0545 92.5 (203.93)            Vital Signs (last 3 days)       Date/Time Temp Pulse Resp BP MAP (mmHg) SpO2 Calculated FIO2 (%) - Nasal Cannula Nasal Cannula O2 Flow Rate (L/min) O2 Device Patient Position - Orthostatic VS Rafa Coma Scale Score Pain    11/29/24 0756 -- -- -- -- -- -- -- -- -- -- 15 No Pain    11/29/24 0737 97.1 °F (36.2 °C) 81 18 139/57 82 97 % -- -- None (Room air) Sitting -- --    11/29/24 0700 -- -- -- -- -- 97 % -- -- None (Room air) -- -- --    11/29/24 0141 -- -- -- -- -- 95 % -- -- -- -- -- --    11/28/24 2232 97.3 °F (36.3 °C) 104 16 111/59 69 97 % -- -- None (Room air) Sitting -- --    11/28/24 2154 -- -- -- -- -- 95 % -- -- -- -- -- --    11/28/24 2130 -- -- -- -- -- 94 % -- -- None (Room air) -- 15 No Pain    11/28/24 2003 -- -- -- -- -- 99 % -- -- -- -- -- --    11/28/24 1946 -- -- -- -- -- 98 % -- -- -- -- -- --    11/28/24 1939 -- -- -- -- -- -- -- -- -- -- -- No Pain    11/28/24 1521 97.5 °F (36.4 °C) 107 16 136/66 79 97 % -- -- None (Room air) Lying -- --    11/28/24 1308 -- -- -- -- -- 98 % -- -- None (Room air) -- -- --    11/28/24 0842 97.9 °F (36.6 °C) 120 16 118/51 64 98 % -- -- -- Lying -- No Pain    11/28/24 0707 -- -- -- -- -- 98 % -- -- None (Room air) -- -- --    11/28/24 0545 98.8 °F (37.1 °C) 108 16 108/51 73 95 % -- -- None (Room air) Lying -- No Pain    11/28/24 0539 -- -- -- -- -- -- -- -- -- -- 15 --    11/28/24 0538 -- -- -- -- -- 98 % -- -- None (Room air) -- -- --    11/28/24 0500 -- 102 16 97/47 68 100 % -- -- None (Room air) -- -- --    11/28/24 0415 -- 100 16 117/55 74 100 % 36 4 L/min Nasal cannula -- -- --    11/28/24 0409 -- -- -- --  -- 97 % -- -- -- -- -- --    11/28/24 0400 -- 113 20 100/48 69 97 % 36 4 L/min Nasal cannula -- -- --    11/28/24 0345 -- 109 21 85/41 59 95 % -- -- None (Room air) Lying -- --    11/28/24 0230 -- 133 20 104/50 66 100 % 36 4 L/min Nasal cannula Lying -- --    11/28/24 0150 -- 123 14 99/50 -- 100 % 36 4 L/min Nasal cannula Lying -- --    11/28/24 0149 -- -- -- -- -- -- -- -- -- -- 15 --    11/28/24 0148 -- -- -- -- -- -- -- -- Nasal cannula -- -- --    11/28/24 0127 -- -- -- -- -- 97 % -- -- -- -- -- --    11/28/24 0120 99 °F (37.2 °C) 135 16 113/64 -- 92 % 36 4 L/min Nasal cannula -- -- --              Pertinent Labs/Diagnostic Test Results:   Radiology:  X-ray chest 1 view portable   ED Interpretation by Inder Mitchell Jr., DO (11/28 0146)   The CXR was ordered by myself and interpreted by me independently.  On my read, it appears without acute abnormalities:  - The  cardiomediastinal  silhouette  is  unremarkable.    - The  lungs  are  clear.  - No  pleural  effusions.  - No  pneumothorax.  - The  pulmonary  vasculature  is  within  normal  limits.    - The  trachea  is  midline.    - Bony  thorax  is  unremarkable.      - Similar to previous from 10/30/24          Final Interpretation by Elida Carbajal MD (11/28 1042)      No acute cardiopulmonary disease.            Workstation performed: [id number]           Cardiology:  ECG 12 lead   Final Result by Ervin Villegas MD (11/28 7771)   Sinus tachycardia   Rightward axis   Nonspecific ST abnormality   Abnormal ECG   When compared with ECG of 09-Nov-2024 03:52,   No significant change was found   Confirmed by Ervin Villegas ([id number]) on 11/28/2024 6:50:24 AM            Results from last 7 days   Lab Units 11/29/24  0505 11/28/24  0122   WBC Thousand/uL 22.55* 21.04*   HEMOGLOBIN g/dL 10.5* 11.2*   HEMATOCRIT % 33.5* 36.8   PLATELETS Thousands/uL 353 452*   BANDS PCT %  --  3         Results from last 7 days   Lab Units 11/29/24  0505 11/28/24  0451  11/28/24  0122   SODIUM mmol/L 137 138 134*   POTASSIUM mmol/L 4.5 3.4* 3.9   CHLORIDE mmol/L 110* 109* 104   CO2 mmol/L 20* 21 17*   ANION GAP mmol/L 7 8 13   BUN mg/dL 9 13 15   CREATININE mg/dL 0.69 0.78 0.95   EGFR ml/min/1.73sq m 125 109 86   CALCIUM mg/dL 8.9 8.3* 8.9     Results from last 7 days   Lab Units 11/28/24  0122   AST U/L 14   ALT U/L 10   ALK PHOS U/L 75   TOTAL PROTEIN g/dL 7.2   ALBUMIN g/dL 3.8   TOTAL BILIRUBIN mg/dL 0.32         Results from last 7 days   Lab Units 11/29/24  0505 11/28/24  0453 11/28/24  0122   GLUCOSE RANDOM mg/dL 121 128 254*              Results from last 7 days   Lab Units 11/28/24  0453 11/28/24  0122   PH ADRIENNE  7.277* 7.138*   PCO2 ADRIENNE mm Hg 41.7* 43.0   PO2 ADRIENNE mm Hg 34.5* 75.8*   HCO3 ADRIENNE mmol/L 19.0* 14.2*   BASE EXC ADRIENNE mmol/L -7.3 -14.2   O2 CONTENT ADRIENNE ml/dL 9.3 15.4   O2 HGB, VENOUS % 57.7* 89.6*           Results from last 7 days   Lab Units 11/28/24  0136   ETHANOL LVL mg/dL <10             Present on Admission:   Severe persistent asthma with acute exacerbation   Anxiety      Admitting Diagnosis: Severe persistent asthma with acute exacerbation [J45.51]  Age/Sex: 19 y.o. female    Network Utilization Review Department  ATTENTION: Please call with any questions or concerns to [phone] and carefully listen to the prompts so that you are directed to the right person. All voicemails are confidential.   For Discharge needs, contact Care Management DC Support Team at [phone] opt. 2  Send all requests for admission clinical reviews, approved or denied determinations and any other requests to dedicated fax number below belonging to the campus where the patient is receiving treatment. List of dedicated fax numbers for the Facilities:  FACILITY NAME UR FAX NUMBER   ADMISSION DENIALS (Administrative/Medical Necessity) [phone]   DISCHARGE SUPPORT TEAM (NETWORK) [phone]   PARENT CHILD HEALTH (Maternity/NICU/Pediatrics) [phone]   [facility] [phone]   [facility] [phone]   [facility] [phone]   [facility] [phone]   [facility] [phone]   [facility] [phone]   [facility] [phone]   [facility] [phone]   [facility] [phone]   [facility] [phone]   [facility] [phone]   [facility] [phone]

## 2024-11-29 NOTE — PLAN OF CARE
Problem: INFECTION - ADULT  Goal: Absence or prevention of progression during hospitalization  Description: INTERVENTIONS:  - Assess and monitor for signs and symptoms of infection  - Monitor lab/diagnostic results  - Monitor all insertion sites, i.e. indwelling lines, tubes, and drains  - Monitor endotracheal if appropriate and nasal secretions for changes in amount and color  - [place] appropriate cooling/warming therapies per order  - Administer medications as ordered  - Instruct and encourage patient and family to use good hand hygiene technique  - Identify and instruct in appropriate isolation precautions for identified infection/condition  Outcome: Progressing     Problem: SAFETY ADULT  Goal: Patient will remain free of falls  Description: INTERVENTIONS:  - Educate patient/family on patient safety including physical limitations  - Instruct patient to call for assistance with activity   - Consult OT/PT to assist with strengthening/mobility   - Keep Call bell within reach  - Keep bed low and locked with side rails adjusted as appropriate  - Keep care items and personal belongings within reach  - Initiate and maintain comfort rounds  - Make Fall Risk Sign visible to staff  - Offer Toileting every 2 Hours, in advance of need  - Initiate/Maintain bed alarm  - Obtain necessary fall risk management equipment  - Apply yellow socks and bracelet for high fall risk patients  - Consider moving patient to room near nurses station  Outcome: Progressing     Problem: DISCHARGE PLANNING  Goal: Discharge to home or other facility with appropriate resources  Description: INTERVENTIONS:  - Identify barriers to discharge w/patient and caregiver  - Arrange for needed discharge resources and transportation as appropriate  - Identify discharge learning needs (meds, wound care, etc.)  - Arrange for interpretive services to assist at discharge as needed  - Refer to Case Management Department for coordinating discharge planning if  the patient needs post-hospital services based on physician/advanced practitioner order or complex needs related to functional status, cognitive ability, or social support system  Outcome: Progressing     Problem: Knowledge Deficit  Goal: Patient/family/caregiver demonstrates understanding of disease process, treatment plan, medications, and discharge instructions  Description: Complete learning assessment and assess knowledge base.  Interventions:  - Provide teaching at level of understanding  - Provide teaching via preferred learning methods  Outcome: Progressing     Problem: RESPIRATORY - ADULT  Goal: Achieves optimal ventilation and oxygenation  Description: INTERVENTIONS:  - Assess for changes in respiratory status  - Assess for changes in mentation and behavior  - Position to facilitate oxygenation and minimize respiratory effort  - Oxygen administered by appropriate delivery if ordered  - Initiate smoking cessation education as indicated  - Encourage broncho-pulmonary hygiene including cough, deep breathe, Incentive Spirometry  - Assess the need for suctioning and aspirate as needed  - Assess and instruct to report SOB or any respiratory difficulty  - Respiratory Therapy support as indicated  Outcome: Progressing     Problem: METABOLIC, FLUID AND ELECTROLYTES - ADULT  Goal: Electrolytes maintained within normal limits  Description: INTERVENTIONS:  - Monitor labs and assess patient for signs and symptoms of electrolyte imbalances  - Administer electrolyte replacement as ordered  - Monitor response to electrolyte replacements, including repeat lab results as appropriate  - Instruct patient on fluid and nutrition as appropriate  Outcome: Progressing     Problem: COPING  Goal: Will report anxiety at manageable levels  Description: INTERVENTIONS:  - Administer medication as ordered  - Teach and encourage coping skills  - Provide emotional support  - Assess patient/family for anxiety and ability to cope  Outcome:  Progressing

## 2024-11-29 NOTE — ASSESSMENT & PLAN NOTE
-No current eosinophilia  -Patient reports Dupixent was rejected by her insurance.  Hopefully either Nucala or Fasenra would be covered for her.  Following up with Eric Mayo

## 2024-11-29 NOTE — PROGRESS NOTES
Progress Note - Hospitalist   Name: Lakhwinder Puckett 19 y.o. female I MRN: [id number]  Unit/Bed#: E2 -01 I Date of Admission: 11/28/2024   Date of Service: 11/29/2024 I Hospital Day: 0    Assessment & Plan  Severe persistent asthma with acute exacerbation  Presented after worsening wheezing and shortness of breath not improved by home nebulizer's  Patient notes prior to her breathing treatment she was cleaning the house  Patient does have a history of anxiety, she is unclear if this is a contributing component  She denies any recreational drug use, nicotine use, vaping  No known sick contacts, fevers chills.  She also endured a syncopal episode, see below  Patient was hypoxic in the 50s on arrival of EMS, received IV mag and Antelmo  Pulmonology consulted and evaluated, appreciate input  Suspect potential exacerbation in setting of unknown chemical/dust exposure  Patient does follow [facility] and insurance has not covered her Dupixent  Continue Xopenex nebulizers, Trelegy Ellipta  Patient started on IV Solu-Medrol every 6 hours and weaned over to oral today, she still has expiratory wheezing and chest tightness on exam.  Will increase and give additional dose IV Solu-Medrol this evening  Has nebulizer medication at home and nebs, needs refill on rescue inhaler.  Will update to pharmacy  Will need slow prednisone taper on discharge with prednisone on hand at home  Fortunately remains on room air without hypoxia  Check a viral panel and procalcitonin for completeness  Anticipate discharge home within the next 24 hours  Anxiety  Confirms Lexapro. Feels her anxiety has improved with the initiation of Lexapro, continue 10 mg  Patient is also on hydroxyzine 25 mg by mouth every 6 hours as needed  Peripheral eosinophilia  Needs outpatient follow-up with [facility], Dupixent noted to be rejected by insurance  Environmental allergies  Continue home nasal sprays with flonase and azelastine  Montelukast was discontinued as an  outpatient due to side effects  [facility] allergy panel reviewed  Did encourage patient to have the cat not sleep in the bed or at the very least get an air purifier  Syncope  Notes she was taking a nebulizer treatment then blacked out  She is unclear if her anxiety was playing a role in this  Suspect this is in setting of hyperventilation with hypoxemia, although patient does have persistent tachycardia  Check TSH, D-dimer, if elevated will obtain CTA PE  Will monitor on telemetry and obtain echocardiogram    VTE Pharmacologic Prophylaxis: VTE Score: 3 Moderate Risk (Score 3-4) - Pharmacological DVT Prophylaxis Ordered: enoxaparin (Lovenox).    Education and Discussions with Family / Patient: Bedside with cousin    Current Length of Stay: 0 day(s)  Current Patient Status: Observation   Certification Statement: The patient will continue to require additional inpatient hospital stay due to asthma treatment, syncope work up  Discharge Plan: Anticipate discharge tomorrow to home.    Code Status: Level 1 - Full Code    Subjective   Patient seen and examined.  Notes she still feels some chest tightness and wheezing.  She is coughing up some clear mucus.  Denies any fevers chills chest pain or shortness of breath at rest.  No diarrhea.  She is eating.  Does not feel dehydrated.  Denies any nausea vomiting or diarrhea.  Feels her anxiety right now is at baseline.  Denies any suicidal ideations.  Denies any nicotine use, vaping or marijuana use.  She feels nervous that she passed out and does not feel like she can go home yet.    Objective :  Temp:  [97.1 °F (36.2 °C)-97.5 °F (36.4 °C)] 97.1 °F (36.2 °C)  HR:  [] 81  BP: (111-139)/(57-66) 139/57  Resp:  [16-18] 18  SpO2:  [94 %-99 %] 97 %  O2 Device: None (Room air)    Body mass index is 36.12 kg/m².     Input and Output Summary (last 24 hours):   No intake or output data in the 24 hours ending 11/29/24 1103    Physical Exam  Constitutional:       Appearance: Normal  appearance. She is not ill-appearing or diaphoretic.   Cardiovascular:      Rate and Rhythm: Regular rhythm. Tachycardia present.   Pulmonary:      Effort: Pulmonary effort is normal. No respiratory distress.      Comments: Coarse breath sounds at expiratory wheezing throughout.  On room air  Abdominal:      General: Bowel sounds are normal.      Palpations: Abdomen is soft.      Tenderness: There is no abdominal tenderness.   Musculoskeletal:      Right lower leg: No edema.      Left lower leg: No edema.   Skin:     General: Skin is warm and dry.   Neurological:      Mental Status: She is alert and oriented to person, place, and time. Mental status is at baseline.   Psychiatric:         Mood and Affect: Mood normal.         Behavior: Behavior normal.           Lab Results: I have reviewed the following results:   Results from last 7 days   Lab Units 11/29/24  0505 11/28/24  0122   WBC Thousand/uL 22.55* 21.04*   HEMOGLOBIN g/dL 10.5* 11.2*   HEMATOCRIT % 33.5* 36.8   PLATELETS Thousands/uL 353 452*   BANDS PCT %  --  3   LYMPHO PCT %  --  46*   MONO PCT %  --  3*   EOS PCT %  --  8*     Results from last 7 days   Lab Units 11/29/24  0505 11/28/24  0453 11/28/24  0122   SODIUM mmol/L 137   < > 134*   POTASSIUM mmol/L 4.5   < > 3.9   CHLORIDE mmol/L 110*   < > 104   CO2 mmol/L 20*   < > 17*   BUN mg/dL 9   < > 15   CREATININE mg/dL 0.69   < > 0.95   ANION GAP mmol/L 7   < > 13   CALCIUM mg/dL 8.9   < > 8.9   ALBUMIN g/dL  --   --  3.8   TOTAL BILIRUBIN mg/dL  --   --  0.32   ALK PHOS U/L  --   --  75   ALT U/L  --   --  10   AST U/L  --   --  14   GLUCOSE RANDOM mg/dL 121   < > 254*    < > = values in this interval not displayed.     Last 24 Hours Medication List:     Current Facility-Administered Medications:     acetaminophen (TYLENOL) tablet 650 mg, Q6H PRN    aluminum-magnesium hydroxide-simethicone (MAALOX) oral suspension 30 mL, Q6H PRN    enoxaparin (LOVENOX) subcutaneous injection 40 mg, Daily    escitalopram  (LEXAPRO) tablet 10 mg, Daily    fluticasone-vilanterol 200-25 mcg/actuation 1 puff, Daily    guaiFENesin (ROBITUSSIN) oral liquid 200 mg, Q4H PRN    levalbuterol (XOPENEX) inhalation solution 1.25 mg, Q6H PRN **AND** [DISCONTINUED] sodium chloride 0.9 % inhalation solution 3 mL, Q6H PRN    levalbuterol (XOPENEX) inhalation solution 1.25 mg, TID    ondansetron (ZOFRAN) injection 4 mg, Q6H PRN    polyethylene glycol (MIRALAX) packet 17 g, Daily PRN    predniSONE tablet 40 mg, Daily    umeclidinium 62.5 mcg/actuation inhaler AEPB 1 puff, Daily    Administrative Statements   Today, Patient Was Seen By: Lola Gaming PA-C    **Please Note: This note may have been constructed using a voice recognition system.**

## 2024-11-29 NOTE — ASSESSMENT & PLAN NOTE
Continue home nasal sprays with flonase and azelastine  Montelukast was discontinued as an outpatient due to side effects  [facility] allergy panel reviewed  Did encourage patient to have the cat not sleep in the bed or at the very least get an air purifier

## 2024-11-29 NOTE — ASSESSMENT & PLAN NOTE
Confirms Lexapro. Feels her anxiety has improved with the initiation of Lexapro, continue 10 mg  Patient is also on hydroxyzine 25 mg by mouth every 6 hours as needed

## 2024-11-29 NOTE — PLAN OF CARE
Problem: INFECTION - ADULT  Goal: Absence or prevention of progression during hospitalization  Description: INTERVENTIONS:  - Assess and monitor for signs and symptoms of infection  - Monitor lab/diagnostic results  - Monitor all insertion sites, i.e. indwelling lines, tubes, and drains  - Monitor endotracheal if appropriate and nasal secretions for changes in amount and color  - [place] appropriate cooling/warming therapies per order  - Administer medications as ordered  - Instruct and encourage patient and family to use good hand hygiene technique  - Identify and instruct in appropriate isolation precautions for identified infection/condition  Outcome: Progressing     Problem: SAFETY ADULT  Goal: Patient will remain free of falls  Description: INTERVENTIONS:  - Educate patient/family on patient safety including physical limitations  - Instruct patient to call for assistance with activity   - Consult OT/PT to assist with strengthening/mobility   - Keep Call bell within reach  - Keep bed low and locked with side rails adjusted as appropriate  - Keep care items and personal belongings within reach  - Initiate and maintain comfort rounds  - Make Fall Risk Sign visible to staff  - Offer Toileting every 2 Hours, in advance of need  - Initiate/Maintain bed alarm  - Obtain necessary fall risk management equipment  - Apply yellow socks and bracelet for high fall risk patients  - Consider moving patient to room near nurses station  Outcome: Progressing     Problem: DISCHARGE PLANNING  Goal: Discharge to home or other facility with appropriate resources  Description: INTERVENTIONS:  - Identify barriers to discharge w/patient and caregiver  - Arrange for needed discharge resources and transportation as appropriate  - Identify discharge learning needs (meds, wound care, etc.)  - Arrange for interpretive services to assist at discharge as needed  - Refer to Case Management Department for coordinating discharge planning if  the patient needs post-hospital services based on physician/advanced practitioner order or complex needs related to functional status, cognitive ability, or social support system  Outcome: Progressing     Problem: RESPIRATORY - ADULT  Goal: Achieves optimal ventilation and oxygenation  Description: INTERVENTIONS:  - Assess for changes in respiratory status  - Assess for changes in mentation and behavior  - Position to facilitate oxygenation and minimize respiratory effort  - Oxygen administered by appropriate delivery if ordered  - Initiate smoking cessation education as indicated  - Encourage broncho-pulmonary hygiene including cough, deep breathe, Incentive Spirometry  - Assess the need for suctioning and aspirate as needed  - Assess and instruct to report SOB or any respiratory difficulty  - Respiratory Therapy support as indicated  Outcome: Progressing

## 2024-11-29 NOTE — ASSESSMENT & PLAN NOTE
-Patient has a cat which resides in her bedroom.  Advised her to try to keep the cat out of her bedroom and deep clean

## 2024-11-29 NOTE — ASSESSMENT & PLAN NOTE
Presented after worsening wheezing and shortness of breath not improved by home nebulizer's  Patient notes prior to her breathing treatment she was cleaning the house  Patient does have a history of anxiety, she is unclear if this is a contributing component  She denies any recreational drug use, nicotine use, vaping  No known sick contacts, fevers chills.  She also endured a syncopal episode, see below  Patient was hypoxic in the 50s on arrival of EMS, received IV mag and Antelmo  Pulmonology consulted and evaluated, appreciate input  Suspect potential exacerbation in setting of unknown chemical/dust exposure  Patient does follow [facility] pulmonary and insurance has not covered her Dupixent  Continue Xopenex nebulizers, Cosme Ardon  Patient started on IV Solu-Medrol every 6 hours and weaned over to oral today, she still has expiratory wheezing and chest tightness on exam.  Will increase and give additional dose IV Solu-Medrol this evening  Has nebulizer medication at home and nebs, needs refill on rescue inhaler.  Will update to pharmacy  Will need slow prednisone taper on discharge with prednisone on hand at home  Fortunately remains on room air without hypoxia  Check a viral panel and procalcitonin for completeness  Anticipate discharge home within the next 24 hours

## 2024-11-30 PROBLEM — E66.01 MORBID OBESITY DUE TO EXCESS CALORIES (HCC): Status: ACTIVE | Noted: 2023-12-16

## 2024-11-30 LAB
ANION GAP SERPL CALCULATED.3IONS-SCNC: 6 MMOL/L (ref 4–13)
BUN SERPL-MCNC: 16 MG/DL (ref 5–25)
CALCIUM SERPL-MCNC: 8.7 MG/DL (ref 8.4–10.2)
CHLORIDE SERPL-SCNC: 109 MMOL/L (ref 96–108)
CO2 SERPL-SCNC: 23 MMOL/L (ref 21–32)
CREAT SERPL-MCNC: 0.77 MG/DL (ref 0.6–1.3)
ERYTHROCYTE [DISTWIDTH] IN BLOOD BY AUTOMATED COUNT: 16.5 % (ref 11.6–15.1)
GFR SERPL CREATININE-BSD FRML MDRD: 111 ML/MIN/1.73SQ M
GLUCOSE SERPL-MCNC: 117 MG/DL (ref 65–140)
HCT VFR BLD AUTO: 32.8 % (ref 34.8–46.1)
HGB BLD-MCNC: 10.1 G/DL (ref 11.5–15.4)
MCH RBC QN AUTO: 27.1 PG (ref 26.8–34.3)
MCHC RBC AUTO-ENTMCNC: 30.8 G/DL (ref 31.4–37.4)
MCV RBC AUTO: 88 FL (ref 82–98)
PLATELET # BLD AUTO: 328 THOUSANDS/UL (ref 149–390)
PMV BLD AUTO: 10.9 FL (ref 8.9–12.7)
POTASSIUM SERPL-SCNC: 4.2 MMOL/L (ref 3.5–5.3)
PROCALCITONIN SERPL-MCNC: 0.3 NG/ML
RBC # BLD AUTO: 3.73 MILLION/UL (ref 3.81–5.12)
SODIUM SERPL-SCNC: 138 MMOL/L (ref 135–147)
WBC # BLD AUTO: 13.73 THOUSAND/UL (ref 4.31–10.16)

## 2024-11-30 PROCEDURE — 94760 N-INVAS EAR/PLS OXIMETRY 1: CPT

## 2024-11-30 PROCEDURE — 84145 PROCALCITONIN (PCT): CPT

## 2024-11-30 PROCEDURE — 80048 BASIC METABOLIC PNL TOTAL CA: CPT

## 2024-11-30 PROCEDURE — 94762 N-INVAS EAR/PLS OXIMTRY CONT: CPT

## 2024-11-30 PROCEDURE — 99232 SBSQ HOSP IP/OBS MODERATE 35: CPT | Performed by: PHYSICIAN ASSISTANT

## 2024-11-30 PROCEDURE — 85027 COMPLETE CBC AUTOMATED: CPT

## 2024-11-30 PROCEDURE — 94640 AIRWAY INHALATION TREATMENT: CPT

## 2024-11-30 PROCEDURE — 99232 SBSQ HOSP IP/OBS MODERATE 35: CPT | Performed by: INTERNAL MEDICINE

## 2024-11-30 RX ORDER — METHYLPREDNISOLONE SODIUM SUCCINATE 40 MG/ML
40 INJECTION, POWDER, LYOPHILIZED, FOR SOLUTION INTRAMUSCULAR; INTRAVENOUS EVERY 12 HOURS SCHEDULED
Status: COMPLETED | OUTPATIENT
Start: 2024-11-30 | End: 2024-11-30

## 2024-11-30 RX ADMIN — LEVALBUTEROL HYDROCHLORIDE 1.25 MG: 1.25 SOLUTION RESPIRATORY (INHALATION) at 19:26

## 2024-11-30 RX ADMIN — METHYLPREDNISOLONE SODIUM SUCCINATE 40 MG: 40 INJECTION, POWDER, FOR SOLUTION INTRAMUSCULAR; INTRAVENOUS at 09:30

## 2024-11-30 RX ADMIN — FLUTICASONE PROPIONATE 2 SPRAY: 50 SPRAY, METERED NASAL at 08:35

## 2024-11-30 RX ADMIN — IPRATROPIUM BROMIDE 0.5 MG: 0.5 SOLUTION RESPIRATORY (INHALATION) at 07:22

## 2024-11-30 RX ADMIN — IPRATROPIUM BROMIDE 0.5 MG: 0.5 SOLUTION RESPIRATORY (INHALATION) at 13:02

## 2024-11-30 RX ADMIN — ENOXAPARIN SODIUM 40 MG: 40 INJECTION SUBCUTANEOUS at 08:35

## 2024-11-30 RX ADMIN — LEVALBUTEROL HYDROCHLORIDE 1.25 MG: 1.25 SOLUTION RESPIRATORY (INHALATION) at 07:22

## 2024-11-30 RX ADMIN — ESCITALOPRAM OXALATE 10 MG: 10 TABLET ORAL at 08:35

## 2024-11-30 RX ADMIN — AZELASTINE HYDROCHLORIDE 1 SPRAY: 137 SPRAY, METERED NASAL at 18:39

## 2024-11-30 RX ADMIN — LEVALBUTEROL HYDROCHLORIDE 1.25 MG: 1.25 SOLUTION RESPIRATORY (INHALATION) at 13:02

## 2024-11-30 RX ADMIN — AZELASTINE HYDROCHLORIDE 1 SPRAY: 137 SPRAY, METERED NASAL at 08:35

## 2024-11-30 RX ADMIN — IPRATROPIUM BROMIDE 0.5 MG: 0.5 SOLUTION RESPIRATORY (INHALATION) at 19:26

## 2024-11-30 NOTE — PROGRESS NOTES
Progress Note - Pulmonology   Name: Lakhwinder Puckett 19 y.o. female I MRN: [id number]  Unit/Bed#: E2 -01 I Date of Admission: 11/28/2024   Date of Service: 11/30/2024 I Hospital Day: 0    Assessment & Plan  Severe persistent asthma with acute exacerbation  - IV Solumedrol 40mg daily, start oral Prednisone tomorrow  - Cont. Nebs as ordered  - needs allergy testing as outpatient  - Rx Prednisone to keep on hand at home.  - Trying to get biologic therapy thru primary pulmonologist    -Home medication regime: Trelegy 200-62.5 -25 mcg 1 puff daily, DuoNebs every 4 hours as needed, levalbuterol 2 puffs every 6 hours as needed, Zyrtec 10 mg daily, Flonase 1 spray each nostril daily, Claritin daily  -Follows with [facility] last seen in the office 11/15/2024    Peripheral eosinophilia  -Patient reports Dupixent was rejected by her insurance.  - F/U with [facility] pulm to see if alternate biologic is covered.    Environmental allergies  -Patient has a cat which resides in her bedroom.  Advised her to try to keep the cat out of her bedroom and deep clean.    Anxiety  -Thought to be contributing to symptoms, management per primary  Syncope    Morbid obesity due to excess calories (HCC)  - high pretest probability of AZAR  - needs o/p sleep study, which is scheduled  - may be contributing to hard to control asthma    Discussed with pt. Mother at bedside.   Concerns addressed.    24 Hour Events :   Subjective : Found sitting up in bed. Feels better, states she feels worse in the AM with SOB and occasional wheezing. Asked for a treatment today. States felt better this AM compared to previous.    Objective :  Temp:  [97.8 °F (36.6 °C)-98.9 °F (37.2 °C)] 98 °F (36.7 °C)  HR:  [] 85  BP: (101-123)/(52-70) 110/70  Resp:  [16-18] 18  SpO2:  [95 %-98 %] 96 %  O2 Device: None (Room air)    Physical Exam  GEN: WDWN, nad, comfortable  HEENT: NCAT, EOMI  LUNGS: moving air, no wheezing  ABD: soft, nd  EXT: No c/c/e  NEURO: No focal  deficits  MS: Moving all extremities        Lab Results: I have reviewed the following results:   .     11/30/24  0433   WBC 13.73*   HGB 10.1*   HCT 32.8*      SODIUM 138   K 4.2   *   CO2 23   BUN 16   CREATININE 0.77   GLUC 117     Labs per my review reveal leukocytosis, anemia, normal renal function    PFT Results Reviewed: reviewed and interpreted    January 2024: FEV1 3.58L; FVC 4.39L; FEV1/FVC 82%; my impression is normal spirometry, no BDA response, normal diffusion    Spirometry 6/22 per my interpretation shows mild obstruction FEV1 2.49 L, 90% predicted, hyperinflation

## 2024-11-30 NOTE — ASSESSMENT & PLAN NOTE
-Patient has a cat which resides in her bedroom.  Advised her to try to keep the cat out of her bedroom and deep clean.

## 2024-11-30 NOTE — ASSESSMENT & PLAN NOTE
-Patient reports Dupixent was rejected by her insurance.  - F/U with [facility] pulm to see if alternate biologic is covered.

## 2024-11-30 NOTE — ASSESSMENT & PLAN NOTE
Addended by: AKBAR HUNTLEY on: 8/16/2022 05:05 PM     Modules accepted: Orders     Needs outpatient follow-up with [facility], Dupixent noted to be rejected by insurance

## 2024-11-30 NOTE — PROGRESS NOTES
Progress Note - Hospitalist   Name: Lakhwinder Puckett 19 y.o. female I MRN: [id number]  Unit/Bed#: E2 -01 I Date of Admission: 11/28/2024   Date of Service: 11/30/2024 I Hospital Day: 0    Assessment & Plan  Severe persistent asthma with acute exacerbation  Presented after worsening wheezing and shortness of breath not improved by home nebulizer's.  Reports more frequent exacerbations.  [facility]  Was to start Dupixent, however denied by insurance.  Unclear trigger.  Began while cleaning house and scooping litter.  Also reported sore throat, congestion, body aches prior, possible viral infection.  COVID/flu/RSV negative  Sats in the 50s on EMS arrival  Now on room air  CXR with no acute cardiopulmonary disease  Continue nebs, IV steroids.  Suspect patient will need prolonged course of steroid taper on discharge.  Noted elevated procalcitonin x 2.  Clinically improving without antibiotics,  Pulmonology following  Anxiety  Confirms Lexapro. Feels her anxiety has improved with the initiation of Lexapro, continue 10 mg  Patient is also on hydroxyzine 25 mg by mouth every 6 hours as needed  Suspect the primary cause of her anxiety is her respiratory issues.  Needs close follow-up with her [facility] pulmonologist  Peripheral eosinophilia  Needs outpatient follow-up with [facility], Dupixent noted to be rejected by insurance  Environmental allergies  Continue home nasal sprays with flonase and azelastine  Montelukast was discontinued as an outpatient due to side effects  [place] allergy panel reviewed  Did encourage patient to have the cat not sleep in the bed or at the very least get an air purifier  Syncope  Patient reports multiple syncopal episodes.  Always triggered from asthma exacerbations, followed by patient screaming/having outbursts, followed by loss of consciousness.  Patient reports remembering hyperventilating and losing her vision, does not remember the outbursts or loss of consciousness.  Given the history  and the fact that she does not immediately lose postural tone but instead exhibits outbursts,, suspect that this is largely anxiety/panic attack driven and triggered by respiratory issues.  TSH WNL, D-dimer negative  Echo WNL, no valvular dysfunction  Telemetry shows sinus tachycardia 100-120s  Improve breathing, follow up with pulmonology on discharge. Continue lexapro, if no improvement, can follow up with psychiatry however financial barrier    VTE Pharmacologic Prophylaxis: VTE Score: 3 Moderate Risk (Score 3-4) - Pharmacological DVT Prophylaxis Ordered: enoxaparin (Lovenox).    Mobility:   Basic Mobility Inpatient Raw Score: 24  JH-HLM Goal: 8: Walk 250 feet or more  JH-HLM Achieved: 8: Walk 250 feet ot more  JH-HLM Goal achieved. Continue to encourage appropriate mobility.    Patient Centered Rounds: I performed bedside rounds with nursing staff today.   Discussions with Specialists or Other Care Team Provider: Pulmonology    Education and Discussions with Family / Patient:  Patient to update her family.     Current Length of Stay: 0 day(s)  Current Patient Status: Observation   Certification Statement: The patient will continue to require additional inpatient hospital stay due to asthma exacerbation requiring IV steroids  Discharge Plan: Anticipate discharge tomorrow to home.    Code Status: Level 1 - Full Code    Subjective   Patient reports feeling tight this morning as well as having some wheezing.  She reports that the wheezing improved after her nebulizer treatment.  During her conversation with me, she requires stopping her conversation to force of breath and with notable inspiratory wheeze.  On room air.    Objective :  Temp:  [97.8 °F (36.6 °C)-98.2 °F (36.8 °C)] 98.2 °F (36.8 °C)  HR:  [] 77  BP: (101-123)/(52-63) 109/63  Resp:  [16-18] 16  SpO2:  [95 %-98 %] 95 %  O2 Device: None (Room air)    Body mass index is 35.96 kg/m².     Input and Output Summary (last 24 hours):   No intake or output  data in the 24 hours ending 11/30/24 0903    Physical Exam  Vitals and nursing note reviewed.   Constitutional:       Appearance: Normal appearance.   HENT:      Head: Normocephalic and atraumatic.      Mouth/Throat:      Mouth: Mucous membranes are moist.      Pharynx: Oropharynx is clear. No oropharyngeal exudate.   Eyes:      Extraocular Movements: Extraocular movements intact.   Cardiovascular:      Rate and Rhythm: Normal rate and regular rhythm.      Pulses: Normal pulses.      Heart sounds: Normal heart sounds. No murmur heard.     No friction rub. No gallop.   Pulmonary:      Effort: Pulmonary effort is normal. No respiratory distress.      Breath sounds: No stridor. Wheezing (Inspiratory and expiratory) present. No rales.   Abdominal:      General: Abdomen is flat. Bowel sounds are normal. There is no distension.      Palpations: Abdomen is soft.      Tenderness: There is no abdominal tenderness.   Musculoskeletal:      Right lower leg: No edema.      Left lower leg: No edema.   Skin:     General: Skin is warm and dry.   Neurological:      General: No focal deficit present.      Mental Status: She is alert and oriented to person, place, and time.           Lines/Drains:        Telemetry:  Telemetry Orders (From admission, onward)               24 Hour Telemetry Monitoring  Continuous x 24 Hours (Telem)        Expiring   Question:  Reason for 24 Hour Telemetry  Answer:  Syncope suspected to be cardiac in origin                     Telemetry Reviewed: Sinus Tachycardia  Indication for Continued Telemetry Use: Syncope               Lab Results: I have reviewed the following results:   Results from last 7 days   Lab Units 11/30/24  0433 11/29/24  0505 11/28/24  0122   WBC Thousand/uL 13.73*   < > 21.04*   HEMOGLOBIN g/dL 10.1*   < > 11.2*   HEMATOCRIT % 32.8*   < > 36.8   PLATELETS Thousands/uL 328   < > 452*   BANDS PCT %  --   --  3   LYMPHO PCT %  --   --  46*   MONO PCT %  --   --  3*   EOS PCT %  --   --   8*    < > = values in this interval not displayed.     Results from last 7 days   Lab Units 11/30/24  0433 11/28/24  0453 11/28/24  0122   SODIUM mmol/L 138   < > 134*   POTASSIUM mmol/L 4.2   < > 3.9   CHLORIDE mmol/L 109*   < > 104   CO2 mmol/L 23   < > 17*   BUN mg/dL 16   < > 15   CREATININE mg/dL 0.77   < > 0.95   ANION GAP mmol/L 6   < > 13   CALCIUM mg/dL 8.7   < > 8.9   ALBUMIN g/dL  --   --  3.8   TOTAL BILIRUBIN mg/dL  --   --  0.32   ALK PHOS U/L  --   --  75   ALT U/L  --   --  10   AST U/L  --   --  14   GLUCOSE RANDOM mg/dL 117   < > 254*    < > = values in this interval not displayed.                 Results from last 7 days   Lab Units 11/30/24  0433 11/29/24  0505   PROCALCITONIN ng/ml 0.30* 0.42*       Recent Cultures (last 7 days):         Imaging Results Review: No pertinent imaging studies reviewed.  Other Study Results Review: No additional pertinent studies reviewed.    Last 24 Hours Medication List:     Current Facility-Administered Medications:     acetaminophen (TYLENOL) tablet 650 mg, Q6H PRN    aluminum-magnesium hydroxide-simethicone (MAALOX) oral suspension 30 mL, Q6H PRN    azelastine (ASTELIN) 0.1 % nasal spray 1 spray, BID    enoxaparin (LOVENOX) subcutaneous injection 40 mg, Daily    escitalopram (LEXAPRO) tablet 10 mg, Daily    fluticasone (FLONASE) 50 mcg/act nasal spray 2 spray, Daily    guaiFENesin (ROBITUSSIN) oral liquid 200 mg, Q4H PRN    hydrOXYzine HCL (ATARAX) tablet 25 mg, Q6H PRN    ipratropium (ATROVENT) 0.02 % inhalation solution 0.5 mg, TID    levalbuterol (XOPENEX) inhalation solution 1.25 mg, Q6H PRN **AND** [DISCONTINUED] sodium chloride 0.9 % inhalation solution 3 mL, Q6H PRN    levalbuterol (XOPENEX) inhalation solution 1.25 mg, TID    methylPREDNISolone sodium succinate (Solu-MEDROL) injection 40 mg, Q12H THOMAS    ondansetron (ZOFRAN) injection 4 mg, Q6H PRN    polyethylene glycol (MIRALAX) packet 17 g, Daily PRN    Administrative Statements   Today, Patient Was  Seen By: Braxton Maldonado PA-C      **Please Note: This note may have been constructed using a voice recognition system.**

## 2024-11-30 NOTE — UTILIZATION REVIEW
WAS OBSERVATION 11/28/24 @ 0436 CONVERTED TO INPATIENT ADMISSION 11/28/24 @ 1335 DUE TO CONTINUED STAY REQUIRED TO CARE FOR PATIENT WITH Severe Persistent Asthma w/ Acute Exacerbation requiring IV solumedrol.     Initial Clinical Review    Admission: Date/Time/Statement:   Admission Orders (From admission, onward)       Ordered        11/30/24 1335  INPATIENT ADMISSION  Once            11/28/24 0436  Place in Observation  Once                          Orders Placed This Encounter   Procedures    INPATIENT ADMISSION     Standing Status:   Standing     Number of Occurrences:   1     Level of Care:   Med Surg [16]     Estimated length of stay:   More than 2 Midnights     Certification:   I certify that inpatient services are medically necessary for this patient for a duration of greater than two midnights. See H&P and MD Progress Notes for additional information about the patient's course of treatment.     ED Arrival Information       Expected   -    Arrival   11/28/2024 01:14    Acuity   Emergent              Means of arrival   Ambulance    Escorted by   [facility] ([facility])    Service   Hospitalist    Admission type   Emergency              Arrival complaint   SOB             Chief Complaint   Patient presents with    Respiratory Distress     Pt arrived via EMS. Per EMS low spo2 with little air movement in lungs, EMS gave GCS of 12. EMS gave 3 duo neb breathing tx and 2g mag IV. Pt hx of asthma.       Initial Presentation: 19 y.o. female who presented by EMS to [facility] ED. Admitted as Observation for evaluation and treatment of Severe persistent Asthma w/ Acute exacerbation.      PMHx:  has a past medical history of Asthma, Leukocytosis (6/21/2022), and SIRS (systemic inflammatory response syndrome) (HCC) (12/16/2023).      Presented w/ reports breathing has not felt right today and used nebs without relief at home. As the day progressed, pt felt worse. Pt was hypoxic on EMS arrival sats in  the 50s. On exam, wheezing bilaterally. Abnormal Labs Wilner BG ph 7.138, pO2 75.8, HCO3 14.2, O2 HBG 89.6. glucose 254, WBC 21.04,      Plan: IV solumedrol, DuoNebs. Fluticasone inhaler,.Xopenex PRN.  Pulm  consulted.     Pulm consult: Asthma. Symptoms seems to be triggered w/ exposure to cleaning chemicals. Continue PO Steroids. Levalbuterol.     Date: 11/29/24   Day 2:   Increasing wheezing noted since yesterday. Still w/ chesst tightness. Sats adequate on RA. On exam, wheezing noted.   Abnormal labs: Procal 0.42. WBC 22.55. Plan:  Cont. Levalbuterol. Add Ipratropium. Add IV solumedrol this evening.     11/30/24 BED STATUS CHANGED TO INPATIENT  Pt reports feeling tight this am and wheezing, which improved after nebs tx. During conversation with MD, pt required stopping her conversation to force of breath and with notable inspiratory wheeze. Abnormal labs: Procal 0.30, WBC 13.73. Plan: Continue IV solumedrol today, plan to transition to Prednisone PO tomorrow. Continue Flonase, Atrovent nebs TID, Xopenex TID.    Certification Statement: The patient will continue to require additional inpatient hospital stay due to asthma exacerbation requiring IV steroids     12/1/24 - pt stable for discharge on this date. No events overnight. Pt ambulating without any dyspnea.     ED Treatment-Medication Administration from 11/28/2024 0114 to 11/28/2024 0536         Date/Time Order Dose Route Action     11/28/2024 0126 albuterol inhalation solution 10 mg 10 mg Nebulization Given     11/28/2024 0126 ipratropium (ATROVENT) 0.02 % inhalation solution 1 mg 1 mg Nebulization Given     11/28/2024 0125 sodium chloride 0.9 % inhalation solution 12 mL 12 mL Nebulization Given     11/28/2024 0131 methylPREDNISolone sodium succinate (Solu-MEDROL) injection 125 mg 125 mg Intravenous Given     11/28/2024 0138 EPINEPHrine PF (ADRENALIN) 1 mg/mL injection 0.3 mg 0.3 mg Intramuscular Given     11/28/2024 0128 sodium chloride 0.9 % bolus 1,000 mL  1,000 mL Intravenous New Bag     11/28/2024 0128 ondansetron (ZOFRAN) injection 4 mg 4 mg Intravenous Given     11/28/2024 0147 magnesium sulfate (FOR EMS ONLY) 2 g/50 mL IVPB (premix) 2 g 0 g Does not apply Given to EMS     11/28/2024 0147 ipratropium-albuterol (FOR EMS ONLY) (DUO-NEB) 0.5-2.5 mg/3 mL inhalation solution 6 mL 0 mL Does not apply Given to EMS     11/28/2024 0147 ipratropium-albuterol (FOR EMS ONLY) (DUO-NEB) 0.5-2.5 mg/3 mL inhalation solution 6 mL 0 mL Does not apply Given to EMS     11/28/2024 0345 lactated ringers bolus 1,000 mL 1,000 mL Intravenous New Bag     11/28/2024 0409 albuterol inhalation solution 10 mg 10 mg Nebulization Given     11/28/2024 0409 ipratropium (ATROVENT) 0.02 % inhalation solution 1 mg 1 mg Nebulization Given     11/28/2024 0408 sodium chloride 0.9 % inhalation solution 12 mL 12 mL Nebulization Given            Scheduled Medications:  azelastine, 1 spray, Each Nare, BID  enoxaparin, 40 mg, Subcutaneous, Daily  escitalopram, 10 mg, Oral, Daily  fluticasone, 2 spray, Each Nare, Daily  ipratropium, 0.5 mg, Nebulization, TID  levalbuterol, 1.25 mg, Nebulization, TID  ipratropium (ATROVENT) 0.02 % inhalation solution 0.5 mg  Dose: 0.5 mg  Freq: 4 times daily (RESP) Route: NEBULIZATION  Start: 11/29/24 1200 End: 11/29/24 2108  methylPREDNISolone sodium succinate (Solu-MEDROL) injection 40 mg  Dose: 40 mg  Freq: Every 12 hours scheduled Route: IV  Start: 11/29/24 2100 End: 11/29/24 2203  methylPREDNISolone sodium succinate (Solu-MEDROL) injection 40 mg  Dose: 40 mg  Freq: Every 12 hours scheduled Route: IV  Start: 11/30/24 0915 End: 11/30/24 0933  predniSONE tablet 40 mg  Dose: 40 mg  Freq: Daily Route: PO  Start: 11/29/24 0900 End: 11/29/24 1139  Continuous IV Infusions:  No current facility-administered medications for this encounter.    PRN Meds:  acetaminophen, 650 mg, Oral, Q6H PRN  aluminum-magnesium hydroxide-simethicone, 30 mL, Oral, Q6H PRN  guaiFENesin, 200 mg, Oral,  Q4H PRN  hydrOXYzine HCL, 25 mg, Oral, Q6H PRN  levalbuterol, 1.25 mg, Nebulization, Q6H PRN  ondansetron, 4 mg, Intravenous, Q6H PRN  polyethylene glycol, 17 g, Oral, Daily PRN      ED Triage Vitals   Temperature Pulse Respirations Blood Pressure SpO2 Pain Score   11/28/24 0120 11/28/24 0120 11/28/24 0120 11/28/24 0120 11/28/24 0120 11/28/24 0545   99 °F (37.2 °C) (!) 135 16 113/64 92 % No Pain     Weight (last 2 days) before discharge       Date/Time Weight    11/29/24 0737 92.1 (203)            Vital Signs (last 3 days) before discharge       Date/Time Temp Pulse Resp BP MAP (mmHg) SpO2 Calculated FIO2 (%) - Nasal Cannula Nasal Cannula O2 Flow Rate (L/min) O2 Device Patient Position - Orthostatic VS Rafa Coma Scale Score Pain    12/01/24 0800 -- -- -- -- -- -- -- -- -- -- -- No Pain    12/01/24 0729 97.8 °F (36.6 °C) 88 17 110/75 85 100 % -- -- -- Sitting -- --    12/01/24 0721 -- -- -- -- -- 95 % -- -- None (Room air) -- -- --    11/30/24 2330 97.1 °F (36.2 °C) 87 16 122/62 81 97 % -- -- None (Room air) Sitting -- --    11/30/24 2121 -- -- -- -- -- -- -- -- -- -- 15 No Pain    11/30/24 1926 -- -- -- -- -- 95 % -- -- None (Room air) -- -- --    11/30/24 1517 98.3 °F (36.8 °C) 83 18 130/80 -- 98 % -- -- None (Room air) Sitting -- --    11/30/24 1304 -- -- -- -- -- 96 % -- -- None (Room air) -- -- --    11/30/24 1051 98 °F (36.7 °C) 85 18 110/70 76 96 % -- -- -- Sitting -- --    11/30/24 0902 98.9 °F (37.2 °C) 104 17 121/60 66 95 % -- -- None (Room air) Sitting -- --    11/30/24 0730 -- -- -- -- -- -- -- -- -- -- 15 No Pain    11/30/24 0723 -- -- -- -- -- 95 % -- -- None (Room air) -- -- --    11/30/24 0316 98.2 °F (36.8 °C) 77 16 109/63 77 96 % -- -- None (Room air) Sitting -- --    11/29/24 2253 97.8 °F (36.6 °C) 71 16 101/52 68 97 % -- -- None (Room air) Lying -- --    11/29/24 2052 -- -- -- -- -- -- -- -- None (Room air) -- 15 No Pain    11/29/24 1937 -- -- -- -- -- 97 % -- -- -- -- -- --    11/29/24 1925 --  -- -- -- -- -- -- -- -- -- -- No Pain    11/29/24 1548 98.1 °F (36.7 °C) 105 18 123/56 81 95 % -- -- None (Room air) Sitting -- --    11/29/24 1420 -- -- -- -- -- 98 % -- -- None (Room air) -- -- --    11/29/24 0756 -- -- -- -- -- -- -- -- -- -- 15 No Pain    11/29/24 0737 97.1 °F (36.2 °C) 81 18 139/57 82 97 % -- -- None (Room air) Sitting -- --    11/29/24 0700 -- -- -- -- -- 97 % -- -- None (Room air) -- -- --    11/29/24 0141 -- -- -- -- -- 95 % -- -- -- -- -- --    11/28/24 2232 97.3 °F (36.3 °C) 104 16 111/59 69 97 % -- -- None (Room air) Sitting -- --    11/28/24 2154 -- -- -- -- -- 95 % -- -- -- -- -- --    11/28/24 2130 -- -- -- -- -- 94 % -- -- None (Room air) -- 15 No Pain    11/28/24 2003 -- -- -- -- -- 99 % -- -- -- -- -- --    11/28/24 1946 -- -- -- -- -- 98 % -- -- -- -- -- --    11/28/24 1939 -- -- -- -- -- -- -- -- -- -- -- No Pain    11/28/24 1521 97.5 °F (36.4 °C) 107 16 136/66 79 97 % -- -- None (Room air) Lying -- --    11/28/24 1308 -- -- -- -- -- 98 % -- -- None (Room air) -- -- --    11/28/24 0842 97.9 °F (36.6 °C) 120 16 118/51 64 98 % -- -- -- Lying -- No Pain    11/28/24 0707 -- -- -- -- -- 98 % -- -- None (Room air) -- -- --    11/28/24 0545 98.8 °F (37.1 °C) 108 16 108/51 73 95 % -- -- None (Room air) Lying -- No Pain    11/28/24 0539 -- -- -- -- -- -- -- -- -- -- 15 --    11/28/24 0538 -- -- -- -- -- 98 % -- -- None (Room air) -- -- --    11/28/24 0500 -- 102 16 97/47 68 100 % -- -- None (Room air) -- -- --    11/28/24 0415 -- 100 16 117/55 74 100 % 36 4 L/min Nasal cannula -- -- --    11/28/24 0409 -- -- -- -- -- 97 % -- -- -- -- -- --    11/28/24 0400 -- 113 20 100/48 69 97 % 36 4 L/min Nasal cannula -- -- --    11/28/24 0345 -- 109 21 85/41 59 95 % -- -- None (Room air) Lying -- --    11/28/24 0230 -- 133 20 104/50 66 100 % 36 4 L/min Nasal cannula Lying -- --    11/28/24 0150 -- 123 14 99/50 -- 100 % 36 4 L/min Nasal cannula Lying -- --    11/28/24 0149 -- -- -- -- -- -- -- -- -- --  15 --    11/28/24 0148 -- -- -- -- -- -- -- -- Nasal cannula -- -- --    11/28/24 0127 -- -- -- -- -- 97 % -- -- -- -- -- --    11/28/24 0120 99 °F (37.2 °C) 135 16 113/64 -- 92 % 36 4 L/min Nasal cannula -- -- --              Pertinent Labs/Diagnostic Test Results:   Radiology:  X-ray chest 1 view portable   ED Interpretation by Inder Mitchell Jr., DO (11/28 0146)   The CXR was ordered by myself and interpreted by me independently.  On my read, it appears without acute abnormalities:  - The  cardiomediastinal  silhouette  is  unremarkable.    - The  lungs  are  clear.  - No  pleural  effusions.  - No  pneumothorax.  - The  pulmonary  vasculature  is  within  normal  limits.    - The  trachea  is  midline.    - Bony  thorax  is  unremarkable.      - Similar to previous from 10/30/24          Final Interpretation by Elida Carbajal MD (11/28 8992)      No acute cardiopulmonary disease.            Workstation performed: [id number]           Cardiology:  Echo complete w/ contrast if indicated   Final Result by Randall Moreno DO (11/29 6440)        Left Ventricle: Left ventricular cavity size is normal. Wall thickness    is normal. The left ventricular ejection fraction is 65%. Systolic    function is normal. Wall motion is normal. Diastolic function is normal.     Tricuspid Valve: There is trace regurgitation.         ECG 12 lead   Final Result by Ervin Villegas MD (11/28 5230)   Sinus tachycardia   Rightward axis   Nonspecific ST abnormality   Abnormal ECG   When compared with ECG of 09-Nov-2024 03:52,   No significant change was found   Confirmed by Ervin Villegas ([id number]) on 11/28/2024 6:50:24 AM        GI:  No orders to display       Results from last 7 days   Lab Units 11/29/24  1205   SARS-COV-2  Negative     Results from last 7 days   Lab Units 11/30/24  0433 11/29/24  0505 11/28/24  0122   WBC Thousand/uL 13.73* 22.55* 21.04*   HEMOGLOBIN g/dL 10.1* 10.5* 11.2*   HEMATOCRIT % 32.8* 33.5*  36.8   PLATELETS Thousands/uL 328 353 452*   BANDS PCT %  --   --  3         Results from last 7 days   Lab Units 11/30/24  0433 11/29/24  0505 11/28/24  0453 11/28/24  0122   SODIUM mmol/L 138 137 138 134*   POTASSIUM mmol/L 4.2 4.5 3.4* 3.9   CHLORIDE mmol/L 109* 110* 109* 104   CO2 mmol/L 23 20* 21 17*   ANION GAP mmol/L 6 7 8 13   BUN mg/dL 16 9 13 15   CREATININE mg/dL 0.77 0.69 0.78 0.95   EGFR ml/min/1.73sq m 111 125 109 86   CALCIUM mg/dL 8.7 8.9 8.3* 8.9     Results from last 7 days   Lab Units 11/28/24  0122   AST U/L 14   ALT U/L 10   ALK PHOS U/L 75   TOTAL PROTEIN g/dL 7.2   ALBUMIN g/dL 3.8   TOTAL BILIRUBIN mg/dL 0.32         Results from last 7 days   Lab Units 11/30/24  0433 11/29/24  0505 11/28/24  0453 11/28/24  0122   GLUCOSE RANDOM mg/dL 117 121 128 254*     Results from last 7 days   Lab Units 11/28/24  0453 11/28/24  0122   PH ADRIENNE  7.277* 7.138*   PCO2 ADRIENNE mm Hg 41.7* 43.0   PO2 ADRIENNE mm Hg 34.5* 75.8*   HCO3 ADRIENNE mmol/L 19.0* 14.2*   BASE EXC ADRIENNE mmol/L -7.3 -14.2   O2 CONTENT ADRIENNE ml/dL 9.3 15.4   O2 HGB, VENOUS % 57.7* 89.6*     Results from last 7 days   Lab Units 11/29/24  1204   D-DIMER QUANTITATIVE ug/ml FEU 0.33         Results from last 7 days   Lab Units 11/29/24  0505   TSH 3RD GENERATON uIU/mL 0.664     Results from last 7 days   Lab Units 12/01/24  0511 11/30/24  0433 11/29/24  0505   PROCALCITONIN ng/ml 0.16 0.30* 0.42*     Results from last 7 days   Lab Units 11/29/24  1205   INFLUENZA A PCR  Negative   INFLUENZA B PCR  Negative   RSV PCR  Negative             Results from last 7 days   Lab Units 11/28/24  0136   ETHANOL LVL mg/dL <10       Past Medical History:   Diagnosis Date    Asthma     Leukocytosis 6/21/2022    SIRS (systemic inflammatory response syndrome) ([facility]) 12/16/2023     Present on Admission:   Severe persistent asthma with acute exacerbation   Anxiety   Syncope   Morbid obesity due to excess calories ([facility])      Admitting Diagnosis: Severe persistent asthma with acute  exacerbation [J45.51]  Age/Sex: 19 y.o. female    Network Utilization Review Department  ATTENTION: Please call with any questions or concerns to [phone] and carefully listen to the prompts so that you are directed to the right person. All voicemails are confidential.   For Discharge needs, contact Care Management DC Support Team at [phone] opt. 2  Send all requests for admission clinical reviews, approved or denied determinations and any other requests to dedicated fax number below belonging to the campus where the patient is receiving treatment. List of dedicated fax numbers for the Facilities:  FACILITY NAME UR FAX NUMBER   ADMISSION DENIALS (Administrative/Medical Necessity) [phone]   DISCHARGE SUPPORT TEAM (NETWORK) [phone]   PARENT CHILD HEALTH (Maternity/NICU/Pediatrics) [phone]   [facility] [phone]   [facility] [phone]   [facility] [phone]   [facility] [phone]   Pending sale to [facility] [phone]   [facility] [phone]   [facility] [phone]   [facility] [phone]   [facility] [phone]   [facility] [phone]   [facility] [phone]   [facility] [phone]

## 2024-11-30 NOTE — PLAN OF CARE
Problem: INFECTION - ADULT  Goal: Absence or prevention of progression during hospitalization  Description: INTERVENTIONS:  - Assess and monitor for signs and symptoms of infection  - Monitor lab/diagnostic results  - Monitor all insertion sites, i.e. indwelling lines, tubes, and drains  - Monitor endotracheal if appropriate and nasal secretions for changes in amount and color  - [place] appropriate cooling/warming therapies per order  - Administer medications as ordered  - Instruct and encourage patient and family to use good hand hygiene technique  - Identify and instruct in appropriate isolation precautions for identified infection/condition  Outcome: Progressing     Problem: SAFETY ADULT  Goal: Patient will remain free of falls  Description: INTERVENTIONS:  - Educate patient/family on patient safety including physical limitations  - Instruct patient to call for assistance with activity   - Consult OT/PT to assist with strengthening/mobility   - Keep Call bell within reach  - Keep bed low and locked with side rails adjusted as appropriate  - Keep care items and personal belongings within reach  - Initiate and maintain comfort rounds  - Make Fall Risk Sign visible to staff  - Offer Toileting every 2 Hours, in advance of need  - Initiate/Maintain bed alarm  - Obtain necessary fall risk management equipment  - Apply yellow socks and bracelet for high fall risk patients  - Consider moving patient to room near nurses station  Outcome: Progressing     Problem: DISCHARGE PLANNING  Goal: Discharge to home or other facility with appropriate resources  Description: INTERVENTIONS:  - Identify barriers to discharge w/patient and caregiver  - Arrange for needed discharge resources and transportation as appropriate  - Identify discharge learning needs (meds, wound care, etc.)  - Arrange for interpretive services to assist at discharge as needed  - Refer to Case Management Department for coordinating discharge planning if  the patient needs post-hospital services based on physician/advanced practitioner order or complex needs related to functional status, cognitive ability, or social support system  Outcome: Progressing     Problem: Knowledge Deficit  Goal: Patient/family/caregiver demonstrates understanding of disease process, treatment plan, medications, and discharge instructions  Description: Complete learning assessment and assess knowledge base.  Interventions:  - Provide teaching at level of understanding  - Provide teaching via preferred learning methods  Outcome: Progressing     Problem: RESPIRATORY - ADULT  Goal: Achieves optimal ventilation and oxygenation  Description: INTERVENTIONS:  - Assess for changes in respiratory status  - Assess for changes in mentation and behavior  - Position to facilitate oxygenation and minimize respiratory effort  - Oxygen administered by appropriate delivery if ordered  - Initiate smoking cessation education as indicated  - Encourage broncho-pulmonary hygiene including cough, deep breathe, Incentive Spirometry  - Assess the need for suctioning and aspirate as needed  - Assess and instruct to report SOB or any respiratory difficulty  - Respiratory Therapy support as indicated  Outcome: Progressing     Problem: METABOLIC, FLUID AND ELECTROLYTES - ADULT  Goal: Electrolytes maintained within normal limits  Description: INTERVENTIONS:  - Monitor labs and assess patient for signs and symptoms of electrolyte imbalances  - Administer electrolyte replacement as ordered  - Monitor response to electrolyte replacements, including repeat lab results as appropriate  - Instruct patient on fluid and nutrition as appropriate  Outcome: Progressing     Problem: COPING  Goal: Will report anxiety at manageable levels  Description: INTERVENTIONS:  - Administer medication as ordered  - Teach and encourage coping skills  - Provide emotional support  - Assess patient/family for anxiety and ability to cope  Outcome:  Progressing

## 2024-11-30 NOTE — ASSESSMENT & PLAN NOTE
Confirms Lexapro. Feels her anxiety has improved with the initiation of Lexapro, continue 10 mg  Patient is also on hydroxyzine 25 mg by mouth every 6 hours as needed  Suspect the primary cause of her anxiety is her respiratory issues.  Needs close follow-up with her [facility] pulmonologist

## 2024-11-30 NOTE — ASSESSMENT & PLAN NOTE
Patient reports multiple syncopal episodes.  Always triggered from asthma exacerbations, followed by patient screaming/having outbursts, followed by loss of consciousness.  Patient reports remembering hyperventilating and losing her vision, does not remember the outbursts or loss of consciousness.  Given the history and the fact that she does not immediately lose postural tone but instead exhibits outbursts,, suspect that this is largely anxiety/panic attack driven and triggered by respiratory issues.  TSH WNL, D-dimer negative  Echo WNL, no valvular dysfunction  Telemetry shows sinus tachycardia 100-120s  Improve breathing, follow up with pulmonology on discharge. Continue lexapro, if no improvement, can follow up with psychiatry however financial barrier

## 2024-11-30 NOTE — UTILIZATION REVIEW
NOTIFICATION OF OBSERVATION ADMISSION   AUTHORIZATION REQUEST   SERVICING FACILITY:   [place] [address]  Tax ID: [id number]  NPI: [id number]   ATTENDING PROVIDER:  Attending Name and NPI#: Silvio Thompson Do [[id number]]  Address: [address] [place] [address]  Phone: [phone]     ADMISSION INFORMATION:  Place of Service: On [facility]  Place of Service Code: 22 CPT Code:   Admitting Diagnosis Code/Description:  Severe persistent asthma with acute exacerbation [J45.51]  Observation Admission Date/Time: 11/28/@0436  Discharge Date/Time: No discharge date for patient encounter.     UTILIZATION REVIEW CONTACT:  Junie Whalen, Utilization   Network Utilization Review Department  Phone: [phone]  Fax: [phone]  Email: [email or web address]  Contact for approvals/pending authorizations, clinical reviews, and discharge.     PHYSICIAN ADVISORY SERVICES:  Medical Necessity Denial & [facility] Review  Phone: [phone]  Fax: [phone]  Email: [email or web address]     DISCHARGE SUPPORT TEAM:  For Patients Discharge Needs & Updates  Phone: [phone] opt. 2 Fax: [phone]  Email: [email or web address]

## 2024-11-30 NOTE — ASSESSMENT & PLAN NOTE
- IV Solumedrol 40mg daily, start oral Prednisone tomorrow  - Cont. Nebs as ordered  - needs allergy testing as outpatient  - Rx Prednisone to keep on hand at home.  - Trying to get biologic therapy thru primary pulmonologist    -Home medication regime: Trelegy 200-62.5 -25 mcg 1 puff daily, DuoNebs every 4 hours as needed, levalbuterol 2 puffs every 6 hours as needed, Zyrtec 10 mg daily, Flonase 1 spray each nostril daily, Claritin daily  -Follows with LOR[facility] last seen in the office 11/15/2024

## 2024-11-30 NOTE — ASSESSMENT & PLAN NOTE
- high pretest probability of AZAR  - needs o/p sleep study, which is scheduled  - may be contributing to hard to control asthma    Discussed with pt. Mother at bedside.   Concerns addressed.

## 2024-11-30 NOTE — ASSESSMENT & PLAN NOTE
Presented after worsening wheezing and shortness of breath not improved by home nebulizer's.  Reports more frequent exacerbations.  Follows with [facility].  Was to start Dupixent, however denied by insurance.  Unclear trigger.  Began while cleaning house and scooping litter.  Also reported sore throat, congestion, body aches prior, possible viral infection.  COVID/flu/RSV negative  Sats in the 50s on EMS arrival  Now on room air  CXR with no acute cardiopulmonary disease  Continue nebs, IV steroids.  Suspect patient will need prolonged course of steroid taper on discharge.  Noted elevated procalcitonin x 2.  Clinically improving without antibiotics,  Pulmonology following

## 2024-12-01 VITALS
OXYGEN SATURATION: 100 % | HEART RATE: 88 BPM | HEIGHT: 63 IN | BODY MASS INDEX: 35.97 KG/M2 | RESPIRATION RATE: 17 BRPM | DIASTOLIC BLOOD PRESSURE: 75 MMHG | SYSTOLIC BLOOD PRESSURE: 110 MMHG | WEIGHT: 203 LBS | TEMPERATURE: 97.8 F

## 2024-12-01 LAB — PROCALCITONIN SERPL-MCNC: 0.16 NG/ML

## 2024-12-01 PROCEDURE — 99239 HOSP IP/OBS DSCHRG MGMT >30: CPT | Performed by: PHYSICIAN ASSISTANT

## 2024-12-01 PROCEDURE — 94640 AIRWAY INHALATION TREATMENT: CPT

## 2024-12-01 PROCEDURE — 94760 N-INVAS EAR/PLS OXIMETRY 1: CPT

## 2024-12-01 PROCEDURE — 84145 PROCALCITONIN (PCT): CPT | Performed by: PHYSICIAN ASSISTANT

## 2024-12-01 RX ORDER — IPRATROPIUM BROMIDE AND ALBUTEROL SULFATE 2.5; .5 MG/3ML; MG/3ML
3 SOLUTION RESPIRATORY (INHALATION) 4 TIMES DAILY
Qty: 360 ML | Refills: 0 | Status: SHIPPED | OUTPATIENT
Start: 2024-12-01 | End: 2025-03-01

## 2024-12-01 RX ORDER — IPRATROPIUM BROMIDE AND ALBUTEROL SULFATE 2.5; .5 MG/3ML; MG/3ML
3 SOLUTION RESPIRATORY (INHALATION) EVERY 6 HOURS PRN
Qty: 90 ML | Refills: 0 | Status: SHIPPED | OUTPATIENT
Start: 2024-12-01 | End: 2024-12-01

## 2024-12-01 RX ORDER — FLUTICASONE FUROATE, UMECLIDINIUM BROMIDE AND VILANTEROL TRIFENATATE 200; 62.5; 25 UG/1; UG/1; UG/1
1 POWDER RESPIRATORY (INHALATION) DAILY
Qty: 180 BLISTER | Refills: 0 | Status: SHIPPED | OUTPATIENT
Start: 2024-12-01 | End: 2025-03-01

## 2024-12-01 RX ORDER — MONTELUKAST SODIUM 10 MG/1
10 TABLET ORAL DAILY
Status: DISCONTINUED | OUTPATIENT
Start: 2024-12-01 | End: 2024-12-01 | Stop reason: HOSPADM

## 2024-12-01 RX ORDER — PREDNISONE 20 MG/1
40 TABLET ORAL ONCE
Status: COMPLETED | OUTPATIENT
Start: 2024-12-01 | End: 2024-12-01

## 2024-12-01 RX ORDER — PREDNISONE 20 MG/1
20 TABLET ORAL DAILY PRN
Qty: 20 TABLET | Refills: 0 | Status: SHIPPED | OUTPATIENT
Start: 2024-12-01 | End: 2024-12-01

## 2024-12-01 RX ORDER — IPRATROPIUM BROMIDE AND ALBUTEROL SULFATE 2.5; .5 MG/3ML; MG/3ML
3 SOLUTION RESPIRATORY (INHALATION) 4 TIMES DAILY
Qty: 180 ML | Refills: 0 | Status: SHIPPED | OUTPATIENT
Start: 2024-12-01

## 2024-12-01 RX ORDER — MONTELUKAST SODIUM 10 MG/1
10 TABLET ORAL
Qty: 30 TABLET | Refills: 0 | Status: SHIPPED | OUTPATIENT
Start: 2024-12-01

## 2024-12-01 RX ORDER — PREDNISONE 10 MG/1
TABLET ORAL
Qty: 15 TABLET | Refills: 0 | Status: SHIPPED | OUTPATIENT
Start: 2024-12-01 | End: 2024-12-06

## 2024-12-01 RX ORDER — PREDNISONE 10 MG/1
TABLET ORAL
Qty: 30 TABLET | Refills: 0 | Status: SHIPPED | OUTPATIENT
Start: 2024-12-01 | End: 2024-12-13

## 2024-12-01 RX ADMIN — AZELASTINE HYDROCHLORIDE 1 SPRAY: 137 SPRAY, METERED NASAL at 08:20

## 2024-12-01 RX ADMIN — PREDNISONE 40 MG: 20 TABLET ORAL at 10:43

## 2024-12-01 RX ADMIN — MONTELUKAST 10 MG: 10 TABLET, FILM COATED ORAL at 09:34

## 2024-12-01 RX ADMIN — IPRATROPIUM BROMIDE 0.5 MG: 0.5 SOLUTION RESPIRATORY (INHALATION) at 07:20

## 2024-12-01 RX ADMIN — LEVALBUTEROL HYDROCHLORIDE 1.25 MG: 1.25 SOLUTION RESPIRATORY (INHALATION) at 07:20

## 2024-12-01 RX ADMIN — LEVALBUTEROL HYDROCHLORIDE 1.25 MG: 1.25 SOLUTION RESPIRATORY (INHALATION) at 00:06

## 2024-12-01 RX ADMIN — FLUTICASONE PROPIONATE 2 SPRAY: 50 SPRAY, METERED NASAL at 08:20

## 2024-12-01 RX ADMIN — ENOXAPARIN SODIUM 40 MG: 40 INJECTION SUBCUTANEOUS at 08:22

## 2024-12-01 RX ADMIN — ESCITALOPRAM OXALATE 10 MG: 10 TABLET ORAL at 08:23

## 2024-12-01 NOTE — ASSESSMENT & PLAN NOTE
"Continue home nasal sprays with flonase and azelastine  Montelukast was discontinued as an outpatient due to side effects - discussed with pt. Does not remember any side effects, maybe \"not feeling well\". Plan to restart and monitor.  [place] allergy panel reviewed  Did encourage patient to have the cat not sleep in the bed or at the very least get an air purifier  "

## 2024-12-01 NOTE — DISCHARGE INSTR - OTHER ORDERS
Please follow up with [facility] regarding the Medicaid application that you started. Contact for the office is below.     [address], [address]  [place] [address]  OFFICE HOURS: 7:30 a.m.-5 p.m. Toll-Free: [phone]  Phone: [phone]  FAX: [phone]

## 2024-12-01 NOTE — UTILIZATION REVIEW
Observation to INPT    NOTIFICATION OF INPATIENT ADMISSION   AUTHORIZATION REQUEST   SERVICING FACILITY:   [place] [address]  Tax ID: [id number]  NPI: [id number] ATTENDING PROVIDER:  Attending Name and NPI#: Silvio Thompson Do [[id number]]  Address: [address] [place] [address]  Phone: [phone]     ADMISSION INFORMATION:  Place of Service: Inpatient [facility]  Place of Service Code: 21  Inpatient Admission Date/Time: 11/30/24  1:35 PM  Discharge Date/Time: No discharge date for patient encounter.  Admitting Diagnosis Code/Description:  Severe persistent asthma with acute exacerbation [J45.51]     UTILIZATION REVIEW CONTACT:  Junie Whalen Utilization   Network Utilization Review Department  Phone: [phone]  Fax [phone]  Email: [email or web address]  Contact for approvals/pending authorizations, clinical reviews, and discharge.     PHYSICIAN ADVISORY SERVICES:  Medical Necessity Denial & Rrot-be-Yjjq Review  Phone: [phone]  Fax: [phone]  Email: [email or web address]     DISCHARGE SUPPORT TEAM:  For Patients Discharge Needs & Updates  Phone: [phone] opt. 2 Fax: [phone]  Email: [email or web address]

## 2024-12-01 NOTE — DISCHARGE SUMMARY
"Discharge Summary - Hospitalist   Name: Lakhwinder Puckett 19 y.o. female I MRN: [id number]  Unit/Bed#: E2 -01 I Date of Admission: 11/28/2024   Date of Service: 12/1/2024 I Hospital Day: 1     Assessment & Plan  Severe persistent asthma with acute exacerbation  Presented after worsening wheezing and shortness of breath not improved by home nebulizer's.  Reports more frequent exacerbations.  [facility]  Was to start Dupixent, however denied by insurance.  Unclear trigger.  Began while cleaning house and scooping litter.  Also reported sore throat, congestion, body aches prior, possible viral infection.  COVID/flu/RSV negative  Sats in the 50s on EMS arrival  Now on room air  CXR with no acute cardiopulmonary disease  Procalcitonin down trended without antibiotics  Discharged on prednisone course, continue Trelegy, continue as needed DuoNebs.  Discussed with patient, will restart montelukast. Monitor for anxiety/depressive symptoms.  Anxiety  Confirms Lexapro. Feels her anxiety has improved with the initiation of Lexapro, continue 10 mg  Patient is also on hydroxyzine 25 mg by mouth every 6 hours as needed  Suspect the primary cause of her anxiety is her respiratory issues.  Needs close follow-up with her [facility] pulmonologist  Started on montelukast, monitor anxiety  Peripheral eosinophilia  [facility], Dupixent noted to be rejected by insurance  Environmental allergies  Continue home nasal sprays with flonase and azelastine  Montelukast was discontinued as an outpatient due to side effects - discussed with pt. Does not remember any side effects, maybe \"not feeling well\". Plan to restart and monitor.  [facility] allergy panel reviewed  Did encourage patient to have the cat not sleep in the bed or at the very least get an air purifier  Syncope  Patient reports multiple syncopal episodes.  Always triggered from asthma exacerbations, followed by patient screaming/having outbursts, followed " by loss of consciousness.  Patient reports remembering hyperventilating and losing her vision, does not remember the outbursts or loss of consciousness.  Given the history and the fact that she does not immediately lose postural tone but instead exhibits outbursts,, suspect that this is largely anxiety/panic attack driven and triggered by respiratory issues.  TSH WNL, D-dimer negative  Echo WNL, no valvular dysfunction  Telemetry shows sinus tachycardia 100-120s  Improve breathing, follow up with pulmonology on discharge. Continue lexapro, if no improvement, can follow up with psychiatry however financial barrier  Morbid obesity due to excess calories (HCC)  Could benefit from weight loss     Medical Problems       Resolved Problems  Date Reviewed: 10/31/2024   None       Discharging Physician / Practitioner: Braxton Maldonado PA-C  PCP: Elias Schoen, MD  Admission Date:   Admission Orders (From admission, onward)       Ordered        11/30/24 1335  INPATIENT ADMISSION  Once            11/28/24 0436  Place in Observation  Once                          Discharge Date: 12/01/24    Consultations During Hospital Stay:  Pulmonology    Procedures Performed:   Oxygen supplementation-2 L    Significant Findings / Test Results:   Hypoxic in the 50s per EMS    X-ray chest 1 view portable  Result Date: 11/28/2024  Impression: No acute cardiopulmonary disease. Workstation performed: [id number]         Incidental Findings:   None    Test Results Pending at Discharge (will require follow up):   None     Outpatient Tests Requested:  None    Complications: Shortness of breath, syncope    Reason for Admission: Asthma exacerbation, hypoxia, syncope    Hospital Course:   Lakhwinder Puckett is a 19 y.o. female patient with PMH severe persistent asthma, obesity who originally presented to the hospital on 11/28/2024 due to asthma exacerbation and syncope.  Patient reports that she has been feeling short of breath and wheezing for 3-4 days  "prior to arrival to the emergency department.  She attempted nebulizers at home without any relief.  Syncopal episode was described as \"blacking out, and having an outburst, then losing consciousness, then waking up and EMS\".  On EMS arrival, patient was found to be saturating at 50% and placed on supplemental O2.  She was found to be in a severe asthma exacerbation requiring oxygen, nebulizers, IV steroids.  Also found to have elevated eosinophilia with a cat at home.  She also reported cleaning with cleaning products and scooping litter shortly prior to acute exacerbation.  She underwent treatment and was eventually weaned to room air and transition to p.o. prednisone.  She is being discharged on prednisone taper starting at 40 mg x 3 days, taper by 10 mg every 3 days.  She was also provided with a quick prednisone burst to have on hand for future exacerbations.  She already has Trelegy and DuoNebs which were filled last week but she has not restarted her Trelegy yet.  She was instructed to restart this.  Additionally, we discussed her montelukast given her ongoing allergy and eosinophilia, patient does not remember why she was taken off of it and does not recall any severe side effects.  We have agreed to restart her on this and monitor her for side effects.  Follow-up with [facility] pulmonology      Please see above list of diagnoses and related plan for additional information.     Condition at Discharge: good    Discharge Day Visit / Exam:   Subjective: No acute events overnight.  Patient ambulating without any dyspnea.  Vitals: Blood Pressure: 110/75 (12/01/24 0729)  Pulse: 88 (12/01/24 0729)  Temperature: 97.8 °F (36.6 °C) (12/01/24 0729)  Temp Source: Oral (12/01/24 0729)  Respirations: 17 (12/01/24 0729)  Height: 5' 3\" (160 cm) (11/29/24 0737)  Weight - Scale: 92.1 kg (203 lb) (11/29/24 0737)  SpO2: 100 % (12/01/24 0729)  Physical Exam  Vitals and nursing note reviewed.   Constitutional:       Appearance: Normal " appearance.   HENT:      Head: Normocephalic and atraumatic.      Mouth/Throat:      Mouth: Mucous membranes are moist.      Pharynx: Oropharynx is clear. No oropharyngeal exudate.   Eyes:      Extraocular Movements: Extraocular movements intact.   Cardiovascular:      Rate and Rhythm: Normal rate and regular rhythm.      Pulses: Normal pulses.      Heart sounds: Normal heart sounds. No murmur heard.     No friction rub. No gallop.   Pulmonary:      Effort: Pulmonary effort is normal. No respiratory distress.      Breath sounds: Normal breath sounds. No stridor. No wheezing or rales.   Abdominal:      General: Abdomen is flat. Bowel sounds are normal. There is no distension.      Palpations: Abdomen is soft.      Tenderness: There is no abdominal tenderness.   Musculoskeletal:      Right lower leg: No edema.      Left lower leg: No edema.   Skin:     General: Skin is warm and dry.   Neurological:      General: No focal deficit present.      Mental Status: She is alert and oriented to person, place, and time.          Discussion with Family: Updated  (mother) at bedside.    Discharge instructions/Information to patient and family:   See after visit summary for information provided to patient and family.      Provisions for Follow-Up Care:  See after visit summary for information related to follow-up care and any pertinent home health orders.      Mobility at time of Discharge:   Basic Mobility Inpatient Raw Score: 24  JH-HLM Goal: 8: Walk 250 feet or more  JH-HLM Achieved: 8: Walk 250 feet ot more  HLM Goal achieved. Continue to encourage appropriate mobility.     Disposition:   Home    Planned Readmission: None    Discharge Medications:  See after visit summary for reconciled discharge medications provided to patient and/or family.      Administrative Statements   Discharge Statement:  I have spent a total time of 45 minutes in caring for this patient on the day of the visit/encounter. .    **Please  Note: This note may have been constructed using a voice recognition system**

## 2024-12-01 NOTE — ASSESSMENT & PLAN NOTE
Confirms Lexapro. Feels her anxiety has improved with the initiation of Lexapro, continue 10 mg  Patient is also on hydroxyzine 25 mg by mouth every 6 hours as needed  Suspect the primary cause of her anxiety is her respiratory issues.  Needs close follow-up with her [facility] pulmonologist  Started on montelukast, monitor anxiety

## 2024-12-01 NOTE — CASE MANAGEMENT
Case Management Assessment & Discharge Planning Note    Patient name Lakhwinder Puckett  Location East 2 /E2 -* MRN [id number]  : 2005 Date 2024       Current Admission Date: 2024  Current Admission Diagnosis:Severe persistent asthma with acute exacerbation   Patient Active Problem List    Diagnosis Date Noted Date Diagnosed    Peripheral eosinophilia 2024     Environmental allergies 2024     Eosinophilic asthma 2023     Severe persistent asthma with acute exacerbation 2023     Morbid obesity due to excess calories (HCC) 2023     Syncope 2022     Anxiety 05/10/2022       LOS (days): 1  Geometric Mean LOS (GMLOS) (days):   Days to GMLOS:     OBJECTIVE:    Risk of Unplanned Readmission Score: 13.4         Current admission status: Inpatient  Referral Reason: Other (Discharge planning)    Preferred Pharmacy:   [id number] [place], [place] - [address]  [address]  [place] [facility] [place] [address]  Phone: [phone] Fax: [phone]     [facility] [place] - [address]  [address] [facility] [address]  Phone: [phone] Fax: [phone]    [id number] [place], [place] - [address]  [address]  [place] [address]  Phone: [phone] Fax: [phone]    [facility] [place], [place] - [address]  [address]  [place] [address]  Phone: [phone] Fax: [phone]    Primary Care Provider: Elias Schoen, MD    Primary Insurance: BLUE CROSS  Secondary Insurance:     ASSESSMENT:  Active Health Care Proxies    There are no active Health Care Proxies on file.       Advance Directives  Does patient have a Health Care POA?: No  Does patient have Advance Directives?: No  Primary Contact: Willian López (Mother)  [phone] (Mobile)    Readmission Root Cause  30 Day Readmission: No    Patient Information  Admitted from:: Home  Mental Status: Alert  During Assessment patient  was accompanied by: Parent  Primary Caregiver: Self  Support Systems: Self, Family members, Parent  County of Residence: [place]  What city do you live in?: [place]  Is patient a ?: No    Activities of Daily Living Prior to Admission  Functional Status: Independent  Completes ADLs independently?: Yes  Ambulates independently?: Yes  Does patient use assisted devices?: Yes  Assisted Devices (DME) used: Nebulizer, Other (Comment) (Inhaler)  Does patient currently own DME?: Yes  What DME does the patient currently own?: Nebulizer, Other (Comment) (Inhaler)  Does patient have a history of Outpatient Therapy (PT/OT)?: No  Does the patient have a history of Short-Term Rehab?: No  Does patient have a history of HHC?: No  Does patient currently have HHC?: No    Patient Information Continued  Income Source: Employed  Does patient receive dialysis treatments?: No  Does patient have a history of substance abuse?: No  Does patient have a history of Mental Health Diagnosis?: Yes (Anxiety)  Is patient receiving treatment for mental health?: Yes (Medication management through PCP)    Means of Transportation  Means of Transport to Appts:: Family transport    DISCHARGE DETAILS:    Discharge planning discussed with:: Patient and Patients mother     Contacts  Patient Contacts: Willian López (Mother)  [phone] (Mobile)    Treatment Team Recommendation: Home  Discharge Destination Plan:: Home  Transport at Discharge : Family     CM met with Pt to introduce self, explain role, complete CM assessment, and discuss DC planning. Pts mom was also present.    Pt reports she lives at home with her mom and grandparents. Pt works. She does not drive. Pt is independent with ADLs and does not use AD for ambulation. Pt does have nebulizer and inhaler.     CM discussed with Pt and mom the concern of not having insurance/insurance lapsing. Mom stated that Pts insurance will be expiring the end of this month (December) and that they had  already started the process to apply for Medicaid. Mom states that they have not heard anything yet and it was about a month ago. CM discussed providing Pt/mom with contact for the [facility]. CM provided this to them and also put the contact information on Pts AVS.     CM updated provider of information gathered. CM will continue to follow for discharge planning needs.

## 2024-12-01 NOTE — ASSESSMENT & PLAN NOTE
Presented after worsening wheezing and shortness of breath not improved by home nebulizer's.  Reports more frequent exacerbations.  Follows with [facility].  Was to start Dupixent, however denied by insurance.  Unclear trigger.  Began while cleaning house and scooping litter.  Also reported sore throat, congestion, body aches prior, possible viral infection.  COVID/flu/RSV negative  Sats in the 50s on EMS arrival  Now on room air  CXR with no acute cardiopulmonary disease  Procalcitonin down trended without antibiotics  Discharged on prednisone course, continue Trelegy, continue as needed DuoNebs.  Discussed with patient, will restart montelukast. Monitor for anxiety/depressive symptoms.

## 2024-12-01 NOTE — DISCHARGE INSTR - AVS FIRST PAGE
"You presented due to an asthma exacerbation and temporarily required a little bit of oxygen.  Fortunately there was no signs of infection.    You will need to be stricter with your asthma regimen given your severe asthma.    On discharge:  Restart your Trelegy Ellipta (3 medications and 1 inhaler), once a day, preferably in the morning.  Continue a course of prednisone (40mg x 3 day, 30mg x 3 days, 20mg x 3days, 10mg x 3 days, then stop)  Restart your Singulair (Montelukast). Monitor for side effects including anxiety/depression. If you feel \"off\" or unwell, stop this medication but it would be beneficial for your asthma/allergies if you are able to take it  Call pulmonologist on Monday. He may want to see you sooner, otherwise the 23rd is acceptable.    Remember you have a quick 5 day taper of steroids on hand if you feel as though your breathing is bad. General rule of thumb is if your asthma is unrelieved by your nebulizers x 2 treatments or if you feel like you are going to need to go to the hospital if your breathing doesn't improve. Make sure to let your pulmonologist know so he can follow up with you and call you in a new script to have on hand.    Use Duonebs every 6 hours as needed for on going shortness of breath/wheezing, unrelieved by albuterol inhaler. If you are out of duonebs, the pharmacy has filled a new albuterol nebulizer solution script that you may also use. If you are still wheezing/short of breath after 2 treatments, start your emergency prednisone or come to the ER if severe.  "

## 2024-12-02 ENCOUNTER — TELEPHONE (OUTPATIENT)
Dept: FAMILY MEDICINE CLINIC | Facility: CLINIC | Age: 19
End: 2024-12-02

## 2024-12-02 ENCOUNTER — TRANSITIONAL CARE MANAGEMENT (OUTPATIENT)
Dept: FAMILY MEDICINE CLINIC | Facility: CLINIC | Age: 19
End: 2024-12-02

## 2024-12-02 ENCOUNTER — PATIENT OUTREACH (OUTPATIENT)
Dept: FAMILY MEDICINE CLINIC | Facility: CLINIC | Age: 19
End: 2024-12-02

## 2024-12-02 DIAGNOSIS — Z91.89 HIGH RISK FOR READMISSION: Primary | ICD-10-CM

## 2024-12-02 NOTE — UTILIZATION REVIEW
NOTIFICATION OF ADMISSION DISCHARGE   This is a Notification of Discharge from [facility]. Please be advised that this patient has been discharge from our facility. Below you will find the admission and discharge date and time including the patient’s disposition.   UTILIZATION REVIEW CONTACT:  Celena Cha  Utilization   Network Utilization Review Department  Phone: [phone] x carefully listen to the prompts. All voicemails are confidential.  Email: [email or web address]     ADMISSION INFORMATION  PRESENTATION DATE: 11/28/2024  1:14 AM  OBERVATION ADMISSION DATE: 11/28/2024 0436  INPATIENT ADMISSION DATE: 11/30/24  1:35 PM   DISCHARGE DATE: 12/1/2024 11:05 AM   DISPOSITION:Home/Self Care    Network Utilization Review Department  ATTENTION: Please call with any questions or concerns to [phone] and carefully listen to the prompts so that you are directed to the right person. All voicemails are confidential.   For Discharge needs, contact Care Management DC Support Team at [phone] opt. 2  Send all requests for admission clinical reviews, approved or denied determinations and any other requests to dedicated fax number below belonging to the campus where the patient is receiving treatment. List of dedicated fax numbers for the Facilities:  FACILITY NAME UR FAX NUMBER   ADMISSION DENIALS (Administrative/Medical Necessity) [phone]   DISCHARGE SUPPORT TEAM ([facility]) [phone]   PARENT CHILD HEALTH (Maternity/NICU/Pediatrics) [phone]   [facility] [phone]   [facility] [phone]   [facility] [phone]   [facility] [phone]   [facility] [phone]   [facility] [phone]   [facility] [phone]   [facility]  [phone]   [facility] [phone]   [facility] [phone]   [facility] [phone]   [facility] [phone]

## 2024-12-02 NOTE — PROGRESS NOTES
IB message received re HRR.    I called the patient but received voicemail.  Message was left asking for a return call.  I will continue further outreach.    Chart reviewed.

## 2024-12-02 NOTE — TELEPHONE ENCOUNTER
Providers Evaluation  form received on 12/2/2024  to be completed by PCP. Copy made and placed in PCP folder.    Forms to be delivered to PCP mailbox at assigned time.

## 2024-12-02 NOTE — UTILIZATION REVIEW
Notification of Unplanned, Urgent, or   Emergency Inpatient Admission   AUTHORIZATION REQUEST   [place] [address]  Tax ID: [id number]  NPI: [id number]  Place of Service: Acute Care Hospital  Admission Level of Care: Inpatient  Place of Service Code: 21     Attending Physician Information  Attending Name and NPI#: Silvio Thompson Do [[id number]]  Phone: [phone]     Admission Information  Inpatient Admission Date/Time: 11/30/24  1:35 PM  Discharge Date/Time: 12/1/2024 11:05 AM  Admitting Diagnosis Code/Description:  Severe persistent asthma with acute exacerbation [J45.51]     Utilization Review Contact  Celena Cha Utilization   Phone: [phone]  Fax: [phone]  Email: [email or web address]  Contact for approvals/pending authorizations, clinical reviews, and discharge.     Physician Advisory Services Contact  Medical Necessity Denial & [place] Discussion  Phone: [phone]  Fax: [phone]  Email: [email or web address]     DISCHARGE SUPPORT TEAM:  For Patients Discharge Needs & Updates  Phone: [phone] opt. 2 Fax: [phone]  Email: [email or web address]

## 2024-12-03 ENCOUNTER — TELEPHONE (OUTPATIENT)
Dept: FAMILY MEDICINE CLINIC | Facility: CLINIC | Age: 19
End: 2024-12-03

## 2024-12-03 NOTE — TELEPHONE ENCOUNTER
FAXED ON 12/03/24 TO [facility]  at [phone]. FAX CONFIRMATION RECEIVED.

## 2024-12-05 ENCOUNTER — PATIENT OUTREACH (OUTPATIENT)
Dept: FAMILY MEDICINE CLINIC | Facility: CLINIC | Age: 19
End: 2024-12-05

## 2024-12-05 NOTE — PROGRESS NOTES
I called the patient but received voicemail.  Message was left asking for a return call.  I am sending the 'unable to reach' letter via [employer] and regular mail and await response.

## 2024-12-05 NOTE — LETTER
Date: 12/05/24    Dear Lakhwinder Puckett,   My name is Ximena; I am a registered nurse care manager working with [facility].   I have not been able to reach you and would like to set a time that I can talk with you over the phone or in-person.  My work is to help patients that have complex medical conditions get the care they need. This includes patients who may have been in the hospital or emergency room.    Please call me with any questions you may have. I look forward to meeting with you.    Sincerely,  Ximena  [phone]  Outpatient Care Manager

## 2024-12-20 ENCOUNTER — PATIENT OUTREACH (OUTPATIENT)
Dept: FAMILY MEDICINE CLINIC | Facility: CLINIC | Age: 19
End: 2024-12-20

## 2024-12-30 ENCOUNTER — HOSPITAL ENCOUNTER (EMERGENCY)
Facility: HOSPITAL | Age: 19
Discharge: HOME | End: 2024-12-30
Attending: EMERGENCY MEDICINE
Payer: COMMERCIAL

## 2024-12-30 ENCOUNTER — APPOINTMENT (EMERGENCY)
Dept: RADIOLOGY | Facility: HOSPITAL | Age: 19
End: 2024-12-30
Payer: COMMERCIAL

## 2024-12-30 VITALS
HEART RATE: 103 BPM | TEMPERATURE: 98.9 F | RESPIRATION RATE: 20 BRPM | SYSTOLIC BLOOD PRESSURE: 141 MMHG | DIASTOLIC BLOOD PRESSURE: 59 MMHG | WEIGHT: 207.23 LBS | OXYGEN SATURATION: 100 % | BODY MASS INDEX: 36.71 KG/M2

## 2024-12-30 DIAGNOSIS — J06.9 URI (UPPER RESPIRATORY INFECTION): ICD-10-CM

## 2024-12-30 DIAGNOSIS — J45.901 ASTHMA EXACERBATION: Primary | ICD-10-CM

## 2024-12-30 LAB
ANION GAP SERPL CALCULATED.3IONS-SCNC: 7 MMOL/L (ref 4–13)
BASOPHILS # BLD AUTO: 0.05 THOUSANDS/ΜL (ref 0–0.1)
BASOPHILS NFR BLD AUTO: 1 % (ref 0–1)
BUN SERPL-MCNC: 8 MG/DL (ref 5–25)
CALCIUM SERPL-MCNC: 8.5 MG/DL (ref 8.4–10.2)
CHLORIDE SERPL-SCNC: 107 MMOL/L (ref 96–108)
CO2 SERPL-SCNC: 22 MMOL/L (ref 21–32)
CREAT SERPL-MCNC: 0.73 MG/DL (ref 0.6–1.3)
EOSINOPHIL # BLD AUTO: 0.47 THOUSAND/ΜL (ref 0–0.61)
EOSINOPHIL NFR BLD AUTO: 5 % (ref 0–6)
ERYTHROCYTE [DISTWIDTH] IN BLOOD BY AUTOMATED COUNT: 15.9 % (ref 11.6–15.1)
EXT PREGNANCY TEST URINE: NEGATIVE
EXT. CONTROL: NORMAL
FLUAV RNA RESP QL NAA+PROBE: NEGATIVE
FLUBV RNA RESP QL NAA+PROBE: NEGATIVE
GFR SERPL CREATININE-BSD FRML MDRD: 118 ML/MIN/1.73SQ M
GLUCOSE SERPL-MCNC: 120 MG/DL (ref 65–140)
HCT VFR BLD AUTO: 35.2 % (ref 34.8–46.1)
HGB BLD-MCNC: 11 G/DL (ref 11.5–15.4)
IMM GRANULOCYTES # BLD AUTO: 0.06 THOUSAND/UL (ref 0–0.2)
IMM GRANULOCYTES NFR BLD AUTO: 1 % (ref 0–2)
LYMPHOCYTES # BLD AUTO: 3.2 THOUSANDS/ΜL (ref 0.6–4.47)
LYMPHOCYTES NFR BLD AUTO: 31 % (ref 14–44)
MCH RBC QN AUTO: 27.6 PG (ref 26.8–34.3)
MCHC RBC AUTO-ENTMCNC: 31.3 G/DL (ref 31.4–37.4)
MCV RBC AUTO: 88 FL (ref 82–98)
MONOCYTES # BLD AUTO: 0.66 THOUSAND/ΜL (ref 0.17–1.22)
MONOCYTES NFR BLD AUTO: 6 % (ref 4–12)
NEUTROPHILS # BLD AUTO: 5.85 THOUSANDS/ΜL (ref 1.85–7.62)
NEUTS SEG NFR BLD AUTO: 56 % (ref 43–75)
NRBC BLD AUTO-RTO: 0 /100 WBCS
PLATELET # BLD AUTO: 294 THOUSANDS/UL (ref 149–390)
PMV BLD AUTO: 10.3 FL (ref 8.9–12.7)
POTASSIUM SERPL-SCNC: 3.5 MMOL/L (ref 3.5–5.3)
RBC # BLD AUTO: 3.98 MILLION/UL (ref 3.81–5.12)
RSV RNA RESP QL NAA+PROBE: NEGATIVE
S PYO DNA THROAT QL NAA+PROBE: NOT DETECTED
SARS-COV-2 RNA RESP QL NAA+PROBE: NEGATIVE
SODIUM SERPL-SCNC: 136 MMOL/L (ref 135–147)
WBC # BLD AUTO: 10.29 THOUSAND/UL (ref 4.31–10.16)

## 2024-12-30 PROCEDURE — 99284 EMERGENCY DEPT VISIT MOD MDM: CPT

## 2024-12-30 PROCEDURE — 94760 N-INVAS EAR/PLS OXIMETRY 1: CPT

## 2024-12-30 PROCEDURE — 87651 STREP A DNA AMP PROBE: CPT

## 2024-12-30 PROCEDURE — 80048 BASIC METABOLIC PNL TOTAL CA: CPT

## 2024-12-30 PROCEDURE — 81025 URINE PREGNANCY TEST: CPT

## 2024-12-30 PROCEDURE — 96365 THER/PROPH/DIAG IV INF INIT: CPT

## 2024-12-30 PROCEDURE — 71045 X-RAY EXAM CHEST 1 VIEW: CPT

## 2024-12-30 PROCEDURE — 94640 AIRWAY INHALATION TREATMENT: CPT

## 2024-12-30 PROCEDURE — 94644 CONT INHLJ TX 1ST HOUR: CPT

## 2024-12-30 PROCEDURE — 96366 THER/PROPH/DIAG IV INF ADDON: CPT

## 2024-12-30 PROCEDURE — 96375 TX/PRO/DX INJ NEW DRUG ADDON: CPT

## 2024-12-30 PROCEDURE — 85025 COMPLETE CBC W/AUTO DIFF WBC: CPT

## 2024-12-30 PROCEDURE — 36415 COLL VENOUS BLD VENIPUNCTURE: CPT

## 2024-12-30 PROCEDURE — 99291 CRITICAL CARE FIRST HOUR: CPT

## 2024-12-30 PROCEDURE — 0241U HB NFCT DS VIR RESP RNA 4 TRGT: CPT

## 2024-12-30 PROCEDURE — 94664 DEMO&/EVAL PT USE INHALER: CPT

## 2024-12-30 RX ORDER — IPRATROPIUM BROMIDE AND ALBUTEROL SULFATE 2.5; .5 MG/3ML; MG/3ML
3 SOLUTION RESPIRATORY (INHALATION) 4 TIMES DAILY
Qty: 360 ML | Refills: 0 | Status: SHIPPED | OUTPATIENT
Start: 2024-12-30

## 2024-12-30 RX ORDER — PREDNISONE 20 MG/1
50 TABLET ORAL DAILY
Qty: 10 TABLET | Refills: 0 | Status: SHIPPED | OUTPATIENT
Start: 2024-12-30 | End: 2025-01-03

## 2024-12-30 RX ORDER — ALBUTEROL SULFATE 90 UG/1
2 INHALANT RESPIRATORY (INHALATION) ONCE
Status: COMPLETED | OUTPATIENT
Start: 2024-12-30 | End: 2024-12-30

## 2024-12-30 RX ORDER — ALBUTEROL SULFATE 90 UG/1
2 INHALANT RESPIRATORY (INHALATION) EVERY 6 HOURS PRN
Qty: 6.7 G | Refills: 0 | Status: SHIPPED | OUTPATIENT
Start: 2024-12-30 | End: 2025-01-13

## 2024-12-30 RX ORDER — ALBUTEROL SULFATE 5 MG/ML
10 SOLUTION RESPIRATORY (INHALATION) ONCE
Status: COMPLETED | OUTPATIENT
Start: 2024-12-30 | End: 2024-12-30

## 2024-12-30 RX ORDER — METHYLPREDNISOLONE SODIUM SUCCINATE 125 MG/2ML
125 INJECTION, POWDER, LYOPHILIZED, FOR SOLUTION INTRAMUSCULAR; INTRAVENOUS ONCE
Status: COMPLETED | OUTPATIENT
Start: 2024-12-30 | End: 2024-12-30

## 2024-12-30 RX ORDER — SODIUM CHLORIDE FOR INHALATION 0.9 %
12 VIAL, NEBULIZER (ML) INHALATION ONCE
Status: COMPLETED | OUTPATIENT
Start: 2024-12-30 | End: 2024-12-30

## 2024-12-30 RX ORDER — IPRATROPIUM BROMIDE AND ALBUTEROL SULFATE 2.5; .5 MG/3ML; MG/3ML
3 SOLUTION RESPIRATORY (INHALATION) ONCE
Status: COMPLETED | OUTPATIENT
Start: 2024-12-30 | End: 2024-12-30

## 2024-12-30 RX ORDER — MAGNESIUM SULFATE HEPTAHYDRATE 40 MG/ML
2 INJECTION, SOLUTION INTRAVENOUS ONCE
Status: COMPLETED | OUTPATIENT
Start: 2024-12-30 | End: 2024-12-30

## 2024-12-30 RX ADMIN — IPRATROPIUM BROMIDE AND ALBUTEROL SULFATE 3 ML: 2.5; .5 SOLUTION RESPIRATORY (INHALATION) at 04:35

## 2024-12-30 RX ADMIN — ALBUTEROL SULFATE 10 MG: 2.5 SOLUTION RESPIRATORY (INHALATION) at 02:48

## 2024-12-30 RX ADMIN — MAGNESIUM SULFATE HEPTAHYDRATE 2 G: 40 INJECTION, SOLUTION INTRAVENOUS at 03:18

## 2024-12-30 RX ADMIN — METHYLPREDNISOLONE SODIUM SUCCINATE 125 MG: 125 INJECTION, POWDER, FOR SOLUTION INTRAMUSCULAR; INTRAVENOUS at 03:18

## 2024-12-30 RX ADMIN — ISODIUM CHLORIDE 12 ML: 0.03 SOLUTION RESPIRATORY (INHALATION) at 02:48

## 2024-12-30 RX ADMIN — ALBUTEROL SULFATE 2 PUFF: 90 AEROSOL, METERED RESPIRATORY (INHALATION) at 05:20

## 2024-12-30 RX ADMIN — IPRATROPIUM BROMIDE 1 MG: 0.5 SOLUTION RESPIRATORY (INHALATION) at 02:48

## 2024-12-30 RX ADMIN — SODIUM CHLORIDE 1000 ML: 0.9 INJECTION, SOLUTION INTRAVENOUS at 03:19

## 2024-12-30 NOTE — ED PROVIDER NOTES
Time reflects when diagnosis was documented in both MDM as applicable and the Disposition within this note       Time User Action Codes Description Comment    12/30/2024  5:17 AM Joanna Franklin [J45.901] Asthma exacerbation     12/30/2024  5:17 AM Joanna Franklin [J06.9] URI (upper respiratory infection)           ED Disposition       ED Disposition   Discharge    Condition   Stable    Date/Time   Mon Dec 30, 2024  5:17 AM    Comment   Lakhwinder Pukcett discharge to home/self care.                   Assessment & Plan       Medical Decision Making  DDx including but not limited to: asthma exacerbation, URI, bronchitis, pneumonia; doubt PTX, pneumomediastinum or cardiac etiology.      Will obtain chest x-ray to evaluate for cardiopulmonary abnormality including pneumonia, pneumothorax.  Will obtain CBC to evaluate for leukocytosis, anemia.  Will obtain CMP to evaluate kidney function, for electrolyte disturbance.  Will obtain COVID/flu/Strep testing.  Will obtain urine pregnancy.     MACEDO neb, Solu medrol, and Mg given as well    Following heart neb, patient with significant improvement in air movement, decrease in wheezing.  Patient reports only mild ongoing chest tightness.  Will give additional DuoNeb.  Mild leukocytosis, suspect reactive.  Mild anemia, though improved from previous labs.  Labs without actionable derangement.    Following DuoNeb, patient reports significant improvement.  Patient ambulated without dyspnea, hypoxia.  She reports feeling comfortable with plan for discharge home.    At the time of discharge, the patient is in no acute distress. I discussed with the patient the diagnosis, treatment plan, follow-up, return precautions, and discharge instructions; they were given the opportunity to ask questions and verbalized understanding.      Problems Addressed:  Asthma exacerbation: acute illness or injury  URI (upper respiratory infection): acute illness or injury    Amount and/or  "Complexity of Data Reviewed  External Data Reviewed: labs and notes.  Labs: ordered. Decision-making details documented in ED Course.  Radiology: ordered and independent interpretation performed. Decision-making details documented in ED Course.    Risk  Prescription drug management.             Medications   albuterol inhalation solution 10 mg (10 mg Nebulization Given 12/30/24 0248)   ipratropium (ATROVENT) 0.02 % inhalation solution 1 mg (1 mg Nebulization Given 12/30/24 0248)   sodium chloride 0.9 % inhalation solution 12 mL (12 mL Nebulization Given 12/30/24 0248)   methylPREDNISolone sodium succinate (Solu-MEDROL) injection 125 mg (125 mg Intravenous Given 12/30/24 0318)   magnesium sulfate 2 g/50 mL IVPB (premix) 2 g (0 g Intravenous Stopped 12/30/24 0449)   sodium chloride 0.9 % bolus 1,000 mL (0 mL Intravenous Stopped 12/30/24 0449)   ipratropium-albuterol (DUO-NEB) 0.5-2.5 mg/3 mL inhalation solution 3 mL (3 mL Nebulization Given 12/30/24 0435)   albuterol (PROVENTIL HFA,VENTOLIN HFA) inhaler 2 puff (2 puffs Inhalation Given 12/30/24 0520)       ED Risk Strat Scores            CRAFFT      Flowsheet Row Most Recent Value   CRAFFT Initial Screen: During the past 12 months, did you:    1. Drink any alcohol (more than a few sips)?  No Filed at: 12/30/2024 0450   2. Smoke any marijuana or hashish No Filed at: 12/30/2024 0450   3. Use anything else to get high? (\"anything else\" includes illegal drugs, over the counter and prescription drugs, and things that you sniff or 'patiño')? No Filed at: 12/30/2024 0450                    History of Present Illness       Chief Complaint   Patient presents with    URI     Pt reports congestion for 3 days, sore throat, and SOB for 1 hour       Past Medical History:   Diagnosis Date    Asthma     Leukocytosis 6/21/2022    SIRS (systemic inflammatory response syndrome) (HCC) 12/16/2023      History reviewed. No pertinent surgical history.   Family History   Problem Relation Age " of Onset    Thyroid disease Mother     No Known Problems Father       Social History     Tobacco Use    Smoking status: Never    Smokeless tobacco: Never   Vaping Use    Vaping status: Never Used   Substance Use Topics    Alcohol use: Not Currently    Drug use: Not Currently      E-Cigarette/Vaping    E-Cigarette Use Never User       E-Cigarette/Vaping Substances    Nicotine No     THC No     CBD No     Flavoring No     Other No     Unknown No       I have reviewed and agree with the history as documented.     The patient is a 19-year-old female with history of asthma who presents to the ED for evaluation of 3-day history of congestion, sore throat, and 1 hour history of worsening shortness of breath despite using her at home nebulizer.  She states she has been using her inhaler as well.  She otherwise denies fever, abdominal pain, nausea, vomiting, diarrhea, melena, hematochezia, dysuria, hematuria, hemoptysis, leg swelling, recent travel, recent surgery, history of DVT/PE.        Review of Systems   Constitutional:  Negative for chills and fever.   HENT:  Positive for congestion, rhinorrhea and sore throat.    Respiratory:  Positive for cough, chest tightness and wheezing. Negative for shortness of breath.    Cardiovascular:  Negative for chest pain and leg swelling.   Gastrointestinal:  Negative for abdominal pain, constipation, diarrhea, nausea and vomiting.   Genitourinary:  Negative for dysuria and flank pain.   Musculoskeletal:  Negative for arthralgias and myalgias.   Skin:  Negative for rash and wound.   Neurological:  Negative for dizziness, weakness and numbness.   Psychiatric/Behavioral:  Negative for behavioral problems.            Objective       ED Triage Vitals   Temperature Pulse Blood Pressure Respirations SpO2 Patient Position - Orthostatic VS   12/30/24 0152 12/30/24 0152 12/30/24 0152 12/30/24 0152 12/30/24 0152 12/30/24 0504   98.9 °F (37.2 °C) (!) 107 141/59 20 97 % Sitting      Temp Source  Heart Rate Source BP Location FiO2 (%) Pain Score    12/30/24 0152 12/30/24 0152 12/30/24 0504 -- 12/30/24 0152    Oral Monitor Left arm  No Pain      Vitals      Date and Time Temp Pulse SpO2 Resp BP Pain Score FACES Pain Rating User   12/30/24 0504 -- 103 100 % 20 -- -- -- ES   12/30/24 0249 -- -- 97 % -- -- -- -- ES   12/30/24 0152 98.9 °F (37.2 °C) 107 97 % 20 141/59 No Pain -- BRB            Physical Exam  Vitals and nursing note reviewed.   Constitutional:       General: She is not in acute distress.     Appearance: She is well-developed. She is not toxic-appearing.   HENT:      Head: Normocephalic and atraumatic.   Eyes:      Conjunctiva/sclera: Conjunctivae normal.   Cardiovascular:      Rate and Rhythm: Normal rate and regular rhythm.      Heart sounds: No murmur heard.  Pulmonary:      Effort: Pulmonary effort is normal. Tachypnea present. No respiratory distress.      Breath sounds: Decreased air movement present. Wheezing present. No rhonchi or rales.   Abdominal:      Palpations: Abdomen is soft.      Tenderness: There is no abdominal tenderness.   Musculoskeletal:         General: No swelling.      Cervical back: Neck supple.   Skin:     General: Skin is warm and dry.      Capillary Refill: Capillary refill takes less than 2 seconds.   Neurological:      Mental Status: She is alert.   Psychiatric:         Mood and Affect: Mood normal.         Results Reviewed       Procedure Component Value Units Date/Time    POCT pregnancy, urine [[id number]]  (Normal) Collected: 12/30/24 0426    Lab Status: Final result Updated: 12/30/24 0426     EXT Preg Test, Ur Negative     Control Valid    Basic metabolic panel [[id number]] Collected: 12/30/24 0317    Lab Status: Final result Specimen: Blood from Arm, Right Updated: 12/30/24 0338     Sodium 136 mmol/L      Potassium 3.5 mmol/L      Chloride 107 mmol/L      CO2 22 mmol/L      ANION GAP 7 mmol/L      BUN 8 mg/dL      Creatinine 0.73 mg/dL      Glucose 120 mg/dL       Calcium 8.5 mg/dL      eGFR 118 ml/min/1.73sq m     Narrative:      National Kid[employer] Disease Foundation guidelines for Chronic Kidney Disease (CKD):     Stage 1 with normal or high GFR (GFR > 90 mL/min/1.73 square meters)    Stage 2 Mild CKD (GFR = 60-89 mL/min/1.73 square meters)    Stage 3A Moderate CKD (GFR = 45-59 mL/min/1.73 square meters)    Stage 3B Moderate CKD (GFR = 30-44 mL/min/1.73 square meters)    Stage 4 Severe CKD (GFR = 15-29 mL/min/1.73 square meters)    Stage 5 End Stage CKD (GFR <15 mL/min/1.73 square meters)  Note: GFR calculation is accurate only with a steady state creatinine    FLU/RSV/COVID - if FLU/RSV clinically relevant (2hr TAT) [[id number]]  (Normal) Collected: 12/30/24 0245    Lab Status: Final result Specimen: Nares from Nose Updated: 12/30/24 0329     SARS-CoV-2 Negative     INFLUENZA A PCR Negative     INFLUENZA B PCR Negative     RSV PCR Negative    Narrative:      This test has been performed using the CoV-2/Flu/RSV plus assay on the [employer] GeneXpert platform. This test has been validated by the  and verified by the performing laboratory.     This test is designed to amplify and detect the following: nucleocapsid (N), envelope (E), and RNA-dependent RNA polymerase (RdRP) genes of the SARS-CoV-2 genome; matrix (M), basic polymerase (PB2), and acidic protein (PA) segments of the influenza A genome; matrix (M) and non-structural protein (NS) segments of the influenza B genome, and the nucleocapsid genes of RSV A and RSV B.     Positive results are indicative of the presence of Flu A, Flu B, RSV, and/or SARS-CoV-2 RNA. Positive results for SARS-CoV-2 or suspected novel influenza should be reported to state, local, or federal health departments according to local reporting requirements.      All results should be assessed in conjunction with clinical presentation and other laboratory markers for clinical management.     FOR PEDIATRIC PATIENTS - copy/paste COVID Guidelines  URL to browser: [email or web address]       CBC and differential [[id number]]  (Abnormal) Collected: 12/30/24 0317    Lab Status: Final result Specimen: Blood from Arm, Right Updated: 12/30/24 0321     WBC 10.29 Thousand/uL      RBC 3.98 Million/uL      Hemoglobin 11.0 g/dL      Hematocrit 35.2 %      MCV 88 fL      MCH 27.6 pg      MCHC 31.3 g/dL      RDW 15.9 %      MPV 10.3 fL      Platelets 294 Thousands/uL      nRBC 0 /100 WBCs      Segmented % 56 %      Immature Grans % 1 %      Lymphocytes % 31 %      Monocytes % 6 %      Eosinophils Relative 5 %      Basophils Relative 1 %      Absolute Neutrophils 5.85 Thousands/µL      Absolute Immature Grans 0.06 Thousand/uL      Absolute Lymphocytes 3.20 Thousands/µL      Absolute Monocytes 0.66 Thousand/µL      Eosinophils Absolute 0.47 Thousand/µL      Basophils Absolute 0.05 Thousands/µL     Strep A PCR [[id number]]  (Normal) Collected: 12/30/24 0245    Lab Status: Final result Specimen: Throat Updated: 12/30/24 0315     STREP A PCR Not Detected            XR chest 1 view portable   ED Interpretation by Joanna Franklin PA-C (12/30 0508)   No evidence of infiltrate, pneumothorax, or acute cardiopulmonary disease as interpreted by me      Final Interpretation by Elida Carbajal MD (12/30 0608)      No acute cardiopulmonary disease.            Workstation performed: [id number]             CriticalCare Time    Date/Time: 12/30/2024 7:04 AM    Performed by: Joanna Franklin PA-C  Authorized by: Joanna Franklin PA-C    Critical care provider statement:     Critical care time (minutes):  35    Critical care time was exclusive of:  Separately billable procedures and treating other patients    Critical care was necessary to treat or prevent imminent or life-threatening deterioration of the following conditions:  Respiratory failure    Critical care was time spent personally by me on the  following activities:  Evaluation of patient's response to treatment, examination of patient, ordering and review of laboratory studies, ordering and review of radiographic studies, re-evaluation of patient's condition and review of old charts      ED Medication and Procedure Management   Prior to Admission Medications   Prescriptions Last Dose Informant Patient Reported? Taking?   albuterol (2.5 mg/3 mL) 0.083 % nebulizer solution   No No   Sig: Take 3 mL (2.5 mg total) by nebulization every 6 (six) hours as needed for wheezing or shortness of breath   albuterol (PROVENTIL HFA,VENTOLIN HFA) 90 mcg/act inhaler   No No   Sig: Inhale 2 puffs 4 (four) times a day   cetirizine (ZyrTEC) 10 mg tablet   No No   Sig: Take 1 tablet (10 mg total) by mouth daily   escitalopram (LEXAPRO) 10 mg tablet   No No   Sig: Take 1 tablet (10 mg total) by mouth daily   fluticasone (FLONASE) 50 mcg/act nasal spray   Yes No   Si spray into each nostril daily   fluticasone-umeclidinium-vilanterol (Trelegy Ellipta) 200-62.5-25 mcg/actuation AEPB inhaler   No No   Sig: Inhale 1 puff daily Rinse mouth after use.   hydrOXYzine HCL (ATARAX) 25 mg tablet   No No   Sig: Take 1 tablet (25 mg total) by mouth every 6 (six) hours as needed for anxiety   ipratropium-albuterol (DUO-NEB) 0.5-2.5 mg/3 mL nebulizer solution   No No   Sig: Take 3 mL by nebulization 4 (four) times a day   ipratropium-albuterol (DUO-NEB) 0.5-2.5 mg/3 mL nebulizer solution   No No   Sig: Take 3 mL by nebulization 4 (four) times a day   loratadine (CLARITIN) 10 mg tablet   Yes No   Sig: Take 10 mg by mouth daily   montelukast (SINGULAIR) 10 mg tablet   No No   Sig: Take 1 tablet (10 mg total) by mouth daily at bedtime      Facility-Administered Medications: None     Discharge Medication List as of 2024  5:20 AM        START taking these medications    Details   !! albuterol (PROVENTIL HFA,VENTOLIN HFA) 90 mcg/act inhaler Inhale 2 puffs every 6 (six) hours as needed  for wheezing or shortness of breath for up to 14 days, Starting Mon 12/30/2024, Until Mon 1/13/2025 at 2359, Normal      !! ipratropium-albuterol (DUO-NEB) 0.5-2.5 mg/3 mL nebulizer solution Take 3 mL by nebulization 4 (four) times a day, Starting Mon 12/30/2024, Normal      predniSONE 20 mg tablet Take 2.5 tablets (50 mg total) by mouth daily for 4 days, Starting Mon 12/30/2024, Until Fri 1/3/2025, Normal       !! - Potential duplicate medications found. Please discuss with provider.        CONTINUE these medications which have NOT CHANGED    Details   albuterol (2.5 mg/3 mL) 0.083 % nebulizer solution Take 3 mL (2.5 mg total) by nebulization every 6 (six) hours as needed for wheezing or shortness of breath, Starting Wed 10/30/2024, Normal      !! albuterol (PROVENTIL HFA,VENTOLIN HFA) 90 mcg/act inhaler Inhale 2 puffs 4 (four) times a day, Starting Fri 11/29/2024, Normal      cetirizine (ZyrTEC) 10 mg tablet Take 1 tablet (10 mg total) by mouth daily, Starting Wed 5/22/2024, Normal      escitalopram (LEXAPRO) 10 mg tablet Take 1 tablet (10 mg total) by mouth daily, Starting Thu 10/31/2024, Fill Later      fluticasone (FLONASE) 50 mcg/act nasal spray 1 spray into each nostril daily, Starting Sat 6/4/2022, Historical Med      fluticasone-umeclidinium-vilanterol (Trelegy Ellipta) 200-62.5-25 mcg/actuation AEPB inhaler Inhale 1 puff daily Rinse mouth after use., Starting Sun 12/1/2024, Until Sat 3/1/2025, Normal      hydrOXYzine HCL (ATARAX) 25 mg tablet Take 1 tablet (25 mg total) by mouth every 6 (six) hours as needed for anxiety, Starting Sat 11/9/2024, Normal      !! ipratropium-albuterol (DUO-NEB) 0.5-2.5 mg/3 mL nebulizer solution Take 3 mL by nebulization 4 (four) times a day, Starting Sun 12/1/2024, Normal      !! ipratropium-albuterol (DUO-NEB) 0.5-2.5 mg/3 mL nebulizer solution Take 3 mL by nebulization 4 (four) times a day, Starting Sun 12/1/2024, Until Sat 3/1/2025, Normal      loratadine (CLARITIN) 10 mg  tablet Take 10 mg by mouth daily, Historical Med      montelukast (SINGULAIR) 10 mg tablet Take 1 tablet (10 mg total) by mouth daily at bedtime, Starting Sun 12/1/2024, Normal       !! - Potential duplicate medications found. Please discuss with provider.        No discharge procedures on file.  ED SEPSIS DOCUMENTATION   Time reflects when diagnosis was documented in both MDM as applicable and the Disposition within this note       Time User Action Codes Description Comment    12/30/2024  5:17 AM Joanna Franklin [J45.901] Asthma exacerbation     12/30/2024  5:17 AM Joanna Franklin [J06.9] URI (upper respiratory infection)                  Joanna Franklin PA-C  12/30/24 0707

## 2024-12-30 NOTE — DISCHARGE INSTRUCTIONS
Take prednisone as prescribed for the next 4 days    Use Duo Neb nebulizations and albuterol inhaler as needed for cough, wheezing    Return for worsening symptoms, chest pain, trouble breathing, leg swelling, coughing up blood, or any other new/concerning symptoms

## 2025-01-08 ENCOUNTER — HOSPITAL ENCOUNTER (EMERGENCY)
Facility: HOSPITAL | Age: 20
Discharge: HOME | End: 2025-01-09
Attending: EMERGENCY MEDICINE
Payer: COMMERCIAL

## 2025-01-08 DIAGNOSIS — J45.901 ASTHMA EXACERBATION: Primary | ICD-10-CM

## 2025-01-08 PROCEDURE — 99285 EMERGENCY DEPT VISIT HI MDM: CPT

## 2025-01-09 VITALS
RESPIRATION RATE: 20 BRPM | DIASTOLIC BLOOD PRESSURE: 60 MMHG | OXYGEN SATURATION: 99 % | TEMPERATURE: 99.5 F | BODY MASS INDEX: 37.92 KG/M2 | WEIGHT: 214.07 LBS | SYSTOLIC BLOOD PRESSURE: 131 MMHG | HEART RATE: 128 BPM

## 2025-01-09 LAB
ATRIAL RATE: 131 BPM
P AXIS: 51 DEGREES
PR INTERVAL: 104 MS
QRS AXIS: 58 DEGREES
QRSD INTERVAL: 68 MS
QT INTERVAL: 398 MS
QTC INTERVAL: 588 MS
T WAVE AXIS: 11 DEGREES
VENTRICULAR RATE: 131 BPM

## 2025-01-09 PROCEDURE — 96365 THER/PROPH/DIAG IV INF INIT: CPT

## 2025-01-09 PROCEDURE — 93005 ELECTROCARDIOGRAM TRACING: CPT

## 2025-01-09 PROCEDURE — 99291 CRITICAL CARE FIRST HOUR: CPT | Performed by: EMERGENCY MEDICINE

## 2025-01-09 PROCEDURE — 94640 AIRWAY INHALATION TREATMENT: CPT

## 2025-01-09 PROCEDURE — 93010 ELECTROCARDIOGRAM REPORT: CPT | Performed by: INTERNAL MEDICINE

## 2025-01-09 RX ORDER — PREDNISONE 20 MG/1
40 TABLET ORAL DAILY
Qty: 10 TABLET | Refills: 0 | Status: SHIPPED | OUTPATIENT
Start: 2025-01-09 | End: 2025-01-14

## 2025-01-09 RX ORDER — ALBUTEROL SULFATE 5 MG/ML
5 SOLUTION RESPIRATORY (INHALATION) ONCE
Status: COMPLETED | OUTPATIENT
Start: 2025-01-09 | End: 2025-01-09

## 2025-01-09 RX ORDER — MAGNESIUM SULFATE HEPTAHYDRATE 40 MG/ML
2 INJECTION, SOLUTION INTRAVENOUS ONCE
Status: COMPLETED | OUTPATIENT
Start: 2025-01-09 | End: 2025-01-09

## 2025-01-09 RX ORDER — PREDNISONE 20 MG/1
40 TABLET ORAL ONCE
Status: COMPLETED | OUTPATIENT
Start: 2025-01-09 | End: 2025-01-09

## 2025-01-09 RX ADMIN — PREDNISONE 40 MG: 20 TABLET ORAL at 00:31

## 2025-01-09 RX ADMIN — IPRATROPIUM BROMIDE 0.5 MG: 0.5 SOLUTION RESPIRATORY (INHALATION) at 00:32

## 2025-01-09 RX ADMIN — MAGNESIUM SULFATE HEPTAHYDRATE 2 G: 40 INJECTION, SOLUTION INTRAVENOUS at 01:01

## 2025-01-09 RX ADMIN — ALBUTEROL SULFATE 5 MG: 2.5 SOLUTION RESPIRATORY (INHALATION) at 00:32

## 2025-01-09 RX ADMIN — ALBUTEROL SULFATE 5 MG: 2.5 SOLUTION RESPIRATORY (INHALATION) at 01:01

## 2025-01-09 NOTE — ED ATTENDING ATTESTATION
1/8/2025  I, Inder Mitchell Jr, DO, saw and evaluated the patient. I have discussed the patient with the resident/non-physician practitioner and agree with the resident's/non-physician practitioner's findings, Plan of Care, and MDM as documented in the resident's/non-physician practitioner's note, except where noted. All available labs and Radiology studies were reviewed.  I was present for key portions of any procedure(s) performed by the resident/non-physician practitioner and I was immediately available to provide assistance.       At this point I agree with the current assessment done in the Emergency Department.  I have conducted an independent evaluation of this patient a history and physical is as follows:    Final Diagnosis:  1. Asthma exacerbation            MDM       Patient presenting with acute asthma exacerbation.  Patient appears in mild distress.   Patient well-known to me as I have seen her several times for asthma exacerbations.  Has been worse in the past.  Came in early for treatment this time.  Was just recently admitted.      Nebulized treatments and steroids ordered.  Will reassess patient for clinical improvement.    On reassessment after treatment with IV magnesium, multiple DuoNebs and steroid medication, the patient is moving air well and in no distress and oxygen saturation is within normal limits. There is no clinical evidence to suggest pneumonia at this time.     Patient has no wheezing after treatment.  Feels completely better.  I do not feel that she would need admission at this time.      Diagnosis, plan of care and management were discussed with the patient who agreed with plan and feels patient is better and ready to go home. They are to follow up with PCP within the next 1-2 weeks if symptoms are persisting but not as bad as today. The patient was instructed to return if the patient worsened in any way, worsening difficulty breathing or wheezing, persistent fever, irritability  or discomfort, decreased activity responsiveness, inability to keep medication or adequate fluids down with or without vomiting, or as needed.   Advised to avoid exposure to tobacco smoke, polluted air and other known asthma triggers.   Monitor albuterol use to report back at follow up.  Patient aware that increased albuterol use indicates poor control and may need further medication adjustment.          Lab Results:   Abnormal Labs Reviewed - No data to display  Lab Results: I have personally reviewed pertinent lab results.    Imaging:   No orders to display     I have personally reviewed pertinent reports.    EKG, Pathology, and Other Studies: I have personally reviewed pertinent films in PACS    Clinical Impression:    Final diagnoses:   Asthma exacerbation         Disposition    discharged           New Prescriptions:    New Prescriptions    PREDNISONE 20 MG TABLET    Take 2 tablets (40 mg total) by mouth daily for 5 days            Follow-up Instructions:    [facility] Emergency Department  [address] [place] [id number] [phone]  Go to   If symptoms worsen          History of Present Illness   Lakhwinder Puckett is a 19 y.o. female who presents with Asthma (Pt reporting chest tightness after work. Hx of asthma, used nebulizer and took meds before coming in with no improvement. Audible wheezes heard during triage)    has a past medical history of Asthma, Leukocytosis (6/21/2022), and SIRS (systemic inflammatory response syndrome) (HCC) (12/16/2023)..         Objective     Vitals:    01/08/25 2322 01/08/25 2328 01/08/25 2347 01/09/25 0107   BP: 143/83   131/60   BP Location: Right arm      Pulse: (!) 138  (!) 121 (!) 128   Resp: 20   20   Temp: 99.5 °F (37.5 °C)      TempSrc: Oral      SpO2: 100%   99%   Weight:  97.1 kg (214 lb 1.1 oz)       Body mass index is 37.92 kg/m².  No intake or output data in the 24 hours ending 01/09/25 0134  Invasive Devices       Peripheral Intravenous  Line  Duration             Peripheral IV 01/08/25 Dorsal (posterior);Left Hand <1 day                    ED Course         Critical Care Time  CriticalCare Time    Date/Time: 1/9/2025 1:31 AM    Performed by: Inder Mitchell Jr., DO  Authorized by: Inder Mitchell Jr., DO    Critical care provider statement:     Critical care time (minutes):  40    Critical care time was exclusive of:  Separately billable procedures and treating other patients and teaching time    Critical care was necessary to treat or prevent imminent or life-threatening deterioration of the following conditions:  Respiratory failure    Critical care was time spent personally by me on the following activities:  Blood draw for specimens, obtaining history from patient or surrogate, development of treatment plan with patient or surrogate, discussions with consultants, evaluation of patient's response to treatment, examination of patient, interpretation of cardiac output measurements, ordering and performing treatments and interventions, ordering and review of laboratory studies, ordering and review of radiographic studies, review of old charts and re-evaluation of patient's condition    I assumed direction of critical care for this patient from another provider in my specialty: no

## 2025-01-09 NOTE — DISCHARGE INSTRUCTIONS
Schedule appointment with pulmonology.  Return to emergency department if you have any other worsening symptoms.  Continue use nebulizers and rescue inhalers at home as needed.   your prednisone from the pharmacy and take it for 5 days.

## 2025-01-09 NOTE — ED PROVIDER NOTES
Time reflects when diagnosis was documented in both MDM as applicable and the Disposition within this note       Time User Action Codes Description Comment    1/9/2025  1:28 AM Reg Miller Add [J45.901] Asthma exacerbation           ED Disposition       ED Disposition   Discharge    Condition   Stable    Date/Time   Thu Jan 9, 2025  1:28 AM    Comment   Lakhwinder Puckett discharge to home/self care.                   Assessment & Plan       Medical Decision Making  Given patient's wheezing likely think this is an asthma exacerbation.  Will treat with prednisone, albuterol, Atrovent.  After initial reevaluation of the patient and she was still wheezing however retractions have improved and patient was able to speak in close to full sentences.  Initiated another dose of albuterol as well as started magnesium for her.  After the next follow-up.  Patient states that she was no longer wheezing.  Repeat lung exam did not demonstrate any wheezing.  Patient was able to speak in full sentences.  She states that she would like to go home and she feels safe doing so.  Patient states that she still has nebulizers and rescue inhalers and all the supplies needed in case of an exacerbation.  I also wrote patient prescription for prednisone to use over the next few days.  Strict return precautions noted.  Patient voiced understanding.  Patient agreeable and stable for discharge.    Risk  Prescription drug management.             Medications   magnesium sulfate 2 g/50 mL IVPB (premix) 2 g (2 g Intravenous New Bag 1/9/25 0101)   albuterol inhalation solution 5 mg (5 mg Nebulization Given 1/9/25 0032)   predniSONE tablet 40 mg (40 mg Oral Given 1/9/25 0031)   ipratropium (ATROVENT) 0.02 % inhalation solution 0.5 mg (0.5 mg Nebulization Given 1/9/25 0032)   albuterol inhalation solution 5 mg (5 mg Nebulization Given 1/9/25 0101)       ED Risk Strat Scores            CRAFFT      Flowsheet Row Most Recent Value   CRAFFT Initial Screen:  "During the past 12 months, did you:    1. Drink any alcohol (more than a few sips)?  No Filed at: 01/08/2025 1105   2. Smoke any marijuana or hashish No Filed at: 01/08/2025 1383   3. Use anything else to get high? (\"anything else\" includes illegal drugs, over the counter and prescription drugs, and things that you sniff or 'patiño')? No Filed at: 01/08/2025 8274                                          History of Present Illness       Chief Complaint   Patient presents with    Asthma     Pt reporting chest tightness after work. Hx of asthma, used nebulizer and took meds before coming in with no improvement. Audible wheezes heard during triage       Past Medical History:   Diagnosis Date    Asthma     Leukocytosis 6/21/2022    SIRS (systemic inflammatory response syndrome) (HCC) 12/16/2023      History reviewed. No pertinent surgical history.   Family History   Problem Relation Age of Onset    Thyroid disease Mother     No Known Problems Father       Social History     Tobacco Use    Smoking status: Never    Smokeless tobacco: Never   Vaping Use    Vaping status: Never Used   Substance Use Topics    Alcohol use: Not Currently    Drug use: Not Currently      E-Cigarette/Vaping    E-Cigarette Use Never User       E-Cigarette/Vaping Substances    Nicotine No     THC No     CBD No     Flavoring No     Other No     Unknown No       I have reviewed and agree with the history as documented.     Patient is a 19-year-old female with past medical history of asthma that has required hospitalization but never intubation in the past.  She states that she finished work today and started wheezing around 3 or 4.  No known triggers, no viral URI symptoms, no cough no fever no congestion.  States she has daily steroid and rescue inhaler that she has been using repeatedly.  Has been having to use the rescue inhaler more frequently.  Upon initial presentation patient was not speaking in full sentences and audibly wheezing.  Both " inspiratory and expiratory wheezes.  Patient states that she was having a hard time talking.      Asthma  Associated symptoms: shortness of breath and wheezing    Associated symptoms: no abdominal pain, no chest pain, no cough, no fever, no nausea, no rash and no vomiting        Review of Systems   Constitutional:  Negative for fever.   Respiratory:  Positive for shortness of breath and wheezing. Negative for cough and stridor.    Cardiovascular:  Negative for chest pain and palpitations.   Gastrointestinal:  Negative for abdominal pain, nausea and vomiting.   Genitourinary:  Negative for dysuria and hematuria.   Skin:  Negative for rash.   Neurological:  Negative for syncope.   All other systems reviewed and are negative.          Objective       ED Triage Vitals [01/08/25 2322]   Temperature Pulse Blood Pressure Respirations SpO2 Patient Position - Orthostatic VS   99.5 °F (37.5 °C) (!) 138 143/83 20 100 % Sitting      Temp Source Heart Rate Source BP Location FiO2 (%) Pain Score    Oral Monitor Right arm -- No Pain      Vitals      Date and Time Temp Pulse SpO2 Resp BP Pain Score FACES Pain Rating User   01/09/25 0107 -- 128 99 % 20 131/60 -- -- MA   01/08/25 2347 -- 121 -- -- -- -- -- MA   01/08/25 2322 99.5 °F (37.5 °C) 138 100 % 20 143/83 No Pain -- VF            Physical Exam  Vitals and nursing note reviewed.   Constitutional:       General: She is not in acute distress.     Appearance: She is well-developed.   HENT:      Head: Normocephalic and atraumatic.   Cardiovascular:      Rate and Rhythm: Normal rate and regular rhythm.   Pulmonary:      Effort: Pulmonary effort is normal. No respiratory distress.      Breath sounds: No stridor. Wheezing (bilateral expiratory expiratory wheezes that are audible.  Mild retractions.  Inability to speak in full sentences.) present.   Abdominal:      Palpations: Abdomen is soft.      Tenderness: There is no abdominal tenderness.   Musculoskeletal:         General: No  swelling.      Cervical back: Neck supple.   Skin:     Capillary Refill: Capillary refill takes less than 2 seconds.   Neurological:      Mental Status: She is alert.   Psychiatric:         Mood and Affect: Mood normal.         Results Reviewed       None            No orders to display       Procedures    ED Medication and Procedure Management   Prior to Admission Medications   Prescriptions Last Dose Informant Patient Reported? Taking?   albuterol (2.5 mg/3 mL) 0.083 % nebulizer solution   No No   Sig: Take 3 mL (2.5 mg total) by nebulization every 6 (six) hours as needed for wheezing or shortness of breath   albuterol (PROVENTIL HFA,VENTOLIN HFA) 90 mcg/act inhaler   No No   Sig: Inhale 2 puffs 4 (four) times a day   albuterol (PROVENTIL HFA,VENTOLIN HFA) 90 mcg/act inhaler   No No   Sig: Inhale 2 puffs every 6 (six) hours as needed for wheezing or shortness of breath for up to 14 days   cetirizine (ZyrTEC) 10 mg tablet   No No   Sig: Take 1 tablet (10 mg total) by mouth daily   escitalopram (LEXAPRO) 10 mg tablet   No No   Sig: Take 1 tablet (10 mg total) by mouth daily   fluticasone (FLONASE) 50 mcg/act nasal spray   Yes No   Si spray into each nostril daily   fluticasone-umeclidinium-vilanterol (Trelegy Ellipta) 200-62.5-25 mcg/actuation AEPB inhaler   No No   Sig: Inhale 1 puff daily Rinse mouth after use.   hydrOXYzine HCL (ATARAX) 25 mg tablet   No No   Sig: Take 1 tablet (25 mg total) by mouth every 6 (six) hours as needed for anxiety   ipratropium-albuterol (DUO-NEB) 0.5-2.5 mg/3 mL nebulizer solution   No No   Sig: Take 3 mL by nebulization 4 (four) times a day   ipratropium-albuterol (DUO-NEB) 0.5-2.5 mg/3 mL nebulizer solution   No No   Sig: Take 3 mL by nebulization 4 (four) times a day   ipratropium-albuterol (DUO-NEB) 0.5-2.5 mg/3 mL nebulizer solution   No No   Sig: Take 3 mL by nebulization 4 (four) times a day   loratadine (CLARITIN) 10 mg tablet   Yes No   Sig: Take 10 mg by mouth daily    montelukast (SINGULAIR) 10 mg tablet   No No   Sig: Take 1 tablet (10 mg total) by mouth daily at bedtime      Facility-Administered Medications: None     Patient's Medications   Discharge Prescriptions    PREDNISONE 20 MG TABLET    Take 2 tablets (40 mg total) by mouth daily for 5 days       Start Date: 1/9/2025  End Date: 1/14/2025       Order Dose: 40 mg       Quantity: 10 tablet    Refills: 0       ED SEPSIS DOCUMENTATION   Time reflects when diagnosis was documented in both MDM as applicable and the Disposition within this note       Time User Action Codes Description Comment    1/9/2025  1:28 AM Reg Miller Add [J45.901] Asthma exacerbation                  Reg Miller DO  01/09/25 0135

## 2025-02-11 ENCOUNTER — HOSPITAL ENCOUNTER (EMERGENCY)
Facility: HOSPITAL | Age: 20
Discharge: HOME | End: 2025-02-11
Attending: EMERGENCY MEDICINE

## 2025-02-11 VITALS
TEMPERATURE: 98.7 F | BODY MASS INDEX: 37.1 KG/M2 | OXYGEN SATURATION: 96 % | WEIGHT: 209.44 LBS | RESPIRATION RATE: 22 BRPM | HEART RATE: 126 BPM | SYSTOLIC BLOOD PRESSURE: 126 MMHG | DIASTOLIC BLOOD PRESSURE: 65 MMHG

## 2025-02-11 DIAGNOSIS — J45.901 ASTHMA EXACERBATION: Primary | ICD-10-CM

## 2025-02-11 DIAGNOSIS — J45.51 SEVERE PERSISTENT ASTHMA WITH ACUTE EXACERBATION: ICD-10-CM

## 2025-02-11 PROCEDURE — 99284 EMERGENCY DEPT VISIT MOD MDM: CPT

## 2025-02-11 RX ORDER — PREDNISONE 20 MG/1
40 TABLET ORAL DAILY
Qty: 10 TABLET | Refills: 0 | Status: SHIPPED | OUTPATIENT
Start: 2025-02-11 | End: 2025-02-16

## 2025-02-11 RX ORDER — PREDNISONE 20 MG/1
40 TABLET ORAL ONCE
Status: COMPLETED | OUTPATIENT
Start: 2025-02-11 | End: 2025-02-11

## 2025-02-11 RX ORDER — MONTELUKAST SODIUM 10 MG/1
10 TABLET ORAL
Status: DISCONTINUED | OUTPATIENT
Start: 2025-02-11 | End: 2025-02-11

## 2025-02-11 RX ORDER — MONTELUKAST SODIUM 10 MG/1
10 TABLET ORAL ONCE
Status: COMPLETED | OUTPATIENT
Start: 2025-02-11 | End: 2025-02-11

## 2025-02-11 RX ORDER — MONTELUKAST SODIUM 10 MG/1
10 TABLET ORAL
Qty: 20 TABLET | Refills: 0 | Status: SHIPPED | OUTPATIENT
Start: 2025-02-11

## 2025-02-11 RX ADMIN — PREDNISONE 40 MG: 20 TABLET ORAL at 19:36

## 2025-02-11 RX ADMIN — MONTELUKAST 10 MG: 10 TABLET, FILM COATED ORAL at 19:36

## 2025-02-12 NOTE — DISCHARGE INSTRUCTIONS
Please start taking Prednisone 2 tablets by mouth, 1 time each day, for 5 days. I sent a short refill of the Montelukast, please contact your PCP for further refills. Continue using your inhaler/nebulizer as needed, and any over-the-counter medications that help symptoms as well.

## 2025-02-12 NOTE — ED PROVIDER NOTES
Time reflects when diagnosis was documented in both MDM as applicable and the Disposition within this note       Time User Action Codes Description Comment    2/11/2025  7:22 PM George Mccabe [J45.901] Asthma exacerbation     2/11/2025  7:22 PM George Mccabe [J45.51] Severe persistent asthma with acute exacerbation           ED Disposition       ED Disposition   Discharge    Condition   Stable    Date/Time   Tue Feb 11, 2025  7:22 PM    Comment   Lakhwinder Puckett discharge to home/self care.                   Assessment & Plan       Medical Decision Making  19-year-old female presenting with asthma exacerbation, however is feeling better by the time she arrived in the ER after using nebulizer treatments at home.  Exam: Patient well-appearing and in NAD, tachycardic in triage but not tachycardic during exam.  Lungs are CTAB with no wheezing and good air movement throughout all fields.  Normal respiratory effort.  No conversational dyspnea.    Although not currently wheezing patient was experiencing symptoms of asthma exacerbation at home prior to arrival and states that they have been fairly persistent this week.  She does have history of hospitalizations for asthma exacerbations.  Thing would be best to start her on a short course of prednisone to prevent this worsening.  Recommend follow-up with PCP as needed for further evaluation and refills.  She did run out of her montelukast, gave her a 20-day refill for this until she can see her PCP.  Patient expresses understanding of the condition, treatment plan, follow-up instructions, and return precautions.      Risk  Prescription drug management.             Medications   predniSONE tablet 40 mg (40 mg Oral Given 2/11/25 1936)   montelukast (SINGULAIR) tablet 10 mg (10 mg Oral Given 2/11/25 1936)       ED Risk Strat Scores            CRAFFT      Flowsheet Row Most Recent Value   CRAFFT Initial Screen: During the past 12 months, did you:    1. Drink any alcohol  "(more than a few sips)?  No Filed at: 02/11/2025 1937   2. Smoke any marijuana or hashish No Filed at: 02/11/2025 1937   3. Use anything else to get high? (\"anything else\" includes illegal drugs, over the counter and prescription drugs, and things that you sniff or 'patiño')? No Filed at: 02/11/2025 1937                                          History of Present Illness       Chief Complaint   Patient presents with    Asthma     Pt c/o asthma exacerbation, reports using inhaler and neb treatments with minimal relief.        Past Medical History:   Diagnosis Date    Asthma     Leukocytosis 6/21/2022    SIRS (systemic inflammatory response syndrome) (HCC) 12/16/2023      History reviewed. No pertinent surgical history.   Family History   Problem Relation Age of Onset    Thyroid disease Mother     No Known Problems Father       Social History     Tobacco Use    Smoking status: Never    Smokeless tobacco: Never   Vaping Use    Vaping status: Never Used   Substance Use Topics    Alcohol use: Not Currently    Drug use: Not Currently      E-Cigarette/Vaping    E-Cigarette Use Never User       E-Cigarette/Vaping Substances    Nicotine No     THC No     CBD No     Flavoring No     Other No     Unknown No       I have reviewed and agree with the history as documented.     19-year-old female with PMH of asthma presenting for evaluation of SOB, chest tightness, wheezing.  Has been using her inhaler and nebulizer treatments at home initially with minimal relief.  However states that by the time she arrived in the ER for evaluation she was bleeding to feel much better, and at time of evaluation was not symptomatic.  Does admit to prior hospitalizations for asthma, never intubated for asthma.        Review of Systems   Constitutional:  Negative for chills and fever.   HENT:  Negative for ear pain and sore throat.    Eyes:  Negative for pain and visual disturbance.   Respiratory:  Positive for cough, chest tightness, shortness of " breath and wheezing.    Cardiovascular:  Negative for chest pain and palpitations.   Gastrointestinal:  Negative for abdominal pain and vomiting.   Genitourinary:  Negative for dysuria and hematuria.   Musculoskeletal:  Negative for arthralgias and back pain.   Skin:  Negative for color change and rash.   Neurological:  Negative for seizures and syncope.   All other systems reviewed and are negative.          Objective       ED Triage Vitals   Temperature Pulse Blood Pressure Respirations SpO2 Patient Position - Orthostatic VS   02/11/25 1827 02/11/25 1827 02/11/25 1828 02/11/25 1827 02/11/25 1827 02/11/25 1827   98.7 °F (37.1 °C) (!) 126 126/65 22 96 % Sitting      Temp Source Heart Rate Source BP Location FiO2 (%) Pain Score    02/11/25 1827 02/11/25 1827 02/11/25 1827 -- 02/11/25 1827    Oral Monitor Right arm  No Pain      Vitals      Date and Time Temp Pulse SpO2 Resp BP Pain Score FACES Pain Rating User   02/11/25 1828 -- -- -- -- 126/65 -- -- CO   02/11/25 1827 98.7 °F (37.1 °C) 126 96 % 22 -- No Pain -- CO            Physical Exam  Vitals and nursing note reviewed.   Constitutional:       General: She is not in acute distress.     Appearance: She is well-developed.   HENT:      Head: Normocephalic and atraumatic.   Eyes:      Conjunctiva/sclera: Conjunctivae normal.   Cardiovascular:      Rate and Rhythm: Normal rate and regular rhythm.      Heart sounds: No murmur heard.  Pulmonary:      Effort: Pulmonary effort is normal. No respiratory distress.      Breath sounds: Normal breath sounds. No wheezing.   Abdominal:      Palpations: Abdomen is soft.      Tenderness: There is no abdominal tenderness.   Musculoskeletal:         General: No swelling.      Cervical back: Neck supple.   Skin:     General: Skin is warm and dry.      Capillary Refill: Capillary refill takes less than 2 seconds.   Neurological:      Mental Status: She is alert.   Psychiatric:         Mood and Affect: Mood normal.         Results  Reviewed       None            No orders to display       Procedures    ED Medication and Procedure Management   Prior to Admission Medications   Prescriptions Last Dose Informant Patient Reported? Taking?   albuterol (2.5 mg/3 mL) 0.083 % nebulizer solution   No No   Sig: Take 3 mL (2.5 mg total) by nebulization every 6 (six) hours as needed for wheezing or shortness of breath   albuterol (PROVENTIL HFA,VENTOLIN HFA) 90 mcg/act inhaler   No No   Sig: Inhale 2 puffs 4 (four) times a day   cetirizine (ZyrTEC) 10 mg tablet   No No   Sig: Take 1 tablet (10 mg total) by mouth daily   escitalopram (LEXAPRO) 10 mg tablet   No No   Sig: Take 1 tablet (10 mg total) by mouth daily   fluticasone (FLONASE) 50 mcg/act nasal spray   Yes No   Si spray into each nostril daily   fluticasone-umeclidinium-vilanterol (Trelegy Ellipta) 200-62.5-25 mcg/actuation AEPB inhaler   No No   Sig: Inhale 1 puff daily Rinse mouth after use.   hydrOXYzine HCL (ATARAX) 25 mg tablet   No No   Sig: Take 1 tablet (25 mg total) by mouth every 6 (six) hours as needed for anxiety   ipratropium-albuterol (DUO-NEB) 0.5-2.5 mg/3 mL nebulizer solution   No No   Sig: Take 3 mL by nebulization 4 (four) times a day   ipratropium-albuterol (DUO-NEB) 0.5-2.5 mg/3 mL nebulizer solution   No No   Sig: Take 3 mL by nebulization 4 (four) times a day   ipratropium-albuterol (DUO-NEB) 0.5-2.5 mg/3 mL nebulizer solution   No No   Sig: Take 3 mL by nebulization 4 (four) times a day   loratadine (CLARITIN) 10 mg tablet   Yes No   Sig: Take 10 mg by mouth daily   montelukast (SINGULAIR) 10 mg tablet   No No   Sig: Take 1 tablet (10 mg total) by mouth daily at bedtime   montelukast (SINGULAIR) 10 mg tablet   No Yes   Sig: Take 1 tablet (10 mg total) by mouth daily at bedtime      Facility-Administered Medications: None     Discharge Medication List as of 2025  7:55 PM        START taking these medications    Details   predniSONE 20 mg tablet Take 2 tablets (40 mg  total) by mouth daily for 5 days, Starting Tue 2/11/2025, Until Sun 2/16/2025, Normal           CONTINUE these medications which have CHANGED    Details   montelukast (SINGULAIR) 10 mg tablet Take 1 tablet (10 mg total) by mouth daily at bedtime, Starting Tue 2/11/2025, Normal           CONTINUE these medications which have NOT CHANGED    Details   albuterol (2.5 mg/3 mL) 0.083 % nebulizer solution Take 3 mL (2.5 mg total) by nebulization every 6 (six) hours as needed for wheezing or shortness of breath, Starting Wed 10/30/2024, Normal      albuterol (PROVENTIL HFA,VENTOLIN HFA) 90 mcg/act inhaler Inhale 2 puffs 4 (four) times a day, Starting Fri 11/29/2024, Normal      cetirizine (ZyrTEC) 10 mg tablet Take 1 tablet (10 mg total) by mouth daily, Starting Wed 5/22/2024, Normal      escitalopram (LEXAPRO) 10 mg tablet Take 1 tablet (10 mg total) by mouth daily, Starting u 10/31/2024, Fill Later      fluticasone (FLONASE) 50 mcg/act nasal spray 1 spray into each nostril daily, Starting Sat 6/4/2022, Historical Med      fluticasone-umeclidinium-vilanterol (Trelegy Ellipta) 200-62.5-25 mcg/actuation AEPB inhaler Inhale 1 puff daily Rinse mouth after use., Starting Sun 12/1/2024, Until Sat 3/1/2025, Normal      hydrOXYzine HCL (ATARAX) 25 mg tablet Take 1 tablet (25 mg total) by mouth every 6 (six) hours as needed for anxiety, Starting Sat 11/9/2024, Normal      !! ipratropium-albuterol (DUO-NEB) 0.5-2.5 mg/3 mL nebulizer solution Take 3 mL by nebulization 4 (four) times a day, Starting Sun 12/1/2024, Normal      !! ipratropium-albuterol (DUO-NEB) 0.5-2.5 mg/3 mL nebulizer solution Take 3 mL by nebulization 4 (four) times a day, Starting Sun 12/1/2024, Until Sat 3/1/2025, Normal      !! ipratropium-albuterol (DUO-NEB) 0.5-2.5 mg/3 mL nebulizer solution Take 3 mL by nebulization 4 (four) times a day, Starting Mon 12/30/2024, Normal      loratadine (CLARITIN) 10 mg tablet Take 10 mg by mouth daily, Historical Med       !!  - Potential duplicate medications found. Please discuss with provider.        No discharge procedures on file.  ED SEPSIS DOCUMENTATION   Time reflects when diagnosis was documented in both MDM as applicable and the Disposition within this note       Time User Action Codes Description Comment    2/11/2025  7:22 PM George Mccabe [J45.901] Asthma exacerbation     2/11/2025  7:22 PM George Mccabe [J45.51] Severe persistent asthma with acute exacerbation                  George Mccabe PA-C  02/12/25 0536

## 2025-02-28 ENCOUNTER — HOSPITAL ENCOUNTER (EMERGENCY)
Facility: HOSPITAL | Age: 20
Discharge: HOME | End: 2025-02-28
Attending: EMERGENCY MEDICINE

## 2025-02-28 VITALS
SYSTOLIC BLOOD PRESSURE: 112 MMHG | RESPIRATION RATE: 19 BRPM | BODY MASS INDEX: 37.14 KG/M2 | TEMPERATURE: 98.4 F | OXYGEN SATURATION: 100 % | WEIGHT: 209.66 LBS | HEART RATE: 98 BPM | DIASTOLIC BLOOD PRESSURE: 58 MMHG

## 2025-02-28 DIAGNOSIS — J45.901 ACUTE ASTHMA EXACERBATION: Primary | ICD-10-CM

## 2025-02-28 PROCEDURE — 96365 THER/PROPH/DIAG IV INF INIT: CPT

## 2025-02-28 PROCEDURE — 94640 AIRWAY INHALATION TREATMENT: CPT

## 2025-02-28 PROCEDURE — 99291 CRITICAL CARE FIRST HOUR: CPT | Performed by: EMERGENCY MEDICINE

## 2025-02-28 PROCEDURE — 94760 N-INVAS EAR/PLS OXIMETRY 1: CPT

## 2025-02-28 PROCEDURE — 94664 DEMO&/EVAL PT USE INHALER: CPT

## 2025-02-28 PROCEDURE — 96375 TX/PRO/DX INJ NEW DRUG ADDON: CPT

## 2025-02-28 PROCEDURE — 96361 HYDRATE IV INFUSION ADD-ON: CPT

## 2025-02-28 PROCEDURE — 99283 EMERGENCY DEPT VISIT LOW MDM: CPT

## 2025-02-28 PROCEDURE — 94644 CONT INHLJ TX 1ST HOUR: CPT

## 2025-02-28 RX ORDER — PREDNISONE 20 MG/1
60 TABLET ORAL DAILY
Qty: 12 TABLET | Refills: 0 | Status: SHIPPED | OUTPATIENT
Start: 2025-03-01 | End: 2025-03-02

## 2025-02-28 RX ORDER — SODIUM CHLORIDE FOR INHALATION 0.9 %
12 VIAL, NEBULIZER (ML) INHALATION ONCE
Status: COMPLETED | OUTPATIENT
Start: 2025-02-28 | End: 2025-02-28

## 2025-02-28 RX ORDER — ALBUTEROL SULFATE 5 MG/ML
10 SOLUTION RESPIRATORY (INHALATION) ONCE
Status: COMPLETED | OUTPATIENT
Start: 2025-02-28 | End: 2025-02-28

## 2025-02-28 RX ORDER — METHYLPREDNISOLONE SODIUM SUCCINATE 125 MG/2ML
125 INJECTION, POWDER, LYOPHILIZED, FOR SOLUTION INTRAMUSCULAR; INTRAVENOUS ONCE
Status: COMPLETED | OUTPATIENT
Start: 2025-02-28 | End: 2025-02-28

## 2025-02-28 RX ORDER — MAGNESIUM SULFATE HEPTAHYDRATE 40 MG/ML
2 INJECTION, SOLUTION INTRAVENOUS ONCE
Status: COMPLETED | OUTPATIENT
Start: 2025-02-28 | End: 2025-02-28

## 2025-02-28 RX ADMIN — ISODIUM CHLORIDE 12 ML: 0.03 SOLUTION RESPIRATORY (INHALATION) at 12:05

## 2025-02-28 RX ADMIN — IPRATROPIUM BROMIDE 1 MG: 0.5 SOLUTION RESPIRATORY (INHALATION) at 12:05

## 2025-02-28 RX ADMIN — METHYLPREDNISOLONE SODIUM SUCCINATE 125 MG: 125 INJECTION, POWDER, FOR SOLUTION INTRAMUSCULAR; INTRAVENOUS at 12:11

## 2025-02-28 RX ADMIN — ALBUTEROL SULFATE 10 MG: 2.5 SOLUTION RESPIRATORY (INHALATION) at 12:05

## 2025-02-28 RX ADMIN — MAGNESIUM SULFATE HEPTAHYDRATE 2 G: 40 INJECTION, SOLUTION INTRAVENOUS at 12:12

## 2025-02-28 RX ADMIN — SODIUM CHLORIDE 1000 ML: 0.9 INJECTION, SOLUTION INTRAVENOUS at 12:15

## 2025-02-28 NOTE — ED PROVIDER NOTES
Time reflects when diagnosis was documented in both MDM as applicable and the Disposition within this note       Time User Action Codes Description Comment    2/28/2025  1:08 PM Tabitha Lei Add [J45.901] Acute asthma exacerbation           ED Disposition       ED Disposition   Discharge    Condition   Stable    Date/Time   Fri Feb 28, 2025  1:08 PM    Comment   Lakhwinder Puckett discharge to home/self care.                   Assessment & Plan       Medical Decision Making  19-year-old female with h/o asthma presenting with wheezing consistent with acute exacerbation of asthma.    Initial consideration in this patient included exacerbation of asthma, acute bronchitis, pneumonia, influenza/COVID, COPD exacerbation, allergic rhinitis, upper respiratory infections (URI), foreign body airway obstruction, pulmonary embolism (PE) among others.     Patient's lung sounds were expiratory wheezing.  Patient noted to no evidence of fever or other systemic infectious symptoms.  A chest x-ray was not obtained  due to absence of abnormal lung sounds or other evidence suggestive of pneumonia or other acute intrathoracic pathology.     Treatment was initiated with gutierrez neb (albuterol and ipratropium) and IV corticosteroids/magnesium.  Patient was noted to have significant improvement with this treatment and declined additional nebs.      Pt provided with rx for prednisone.  Pt reports she has all her asthma meds at home and does not need a refill.        Critical Care Time Statement: Upon my evaluation, this patient had a high probability of imminent or life-threatening deterioration due to asthma exacerbation, which required my direct attention, intervention, and personal management.  I spent a total of 62 minutes directly providing critical care services, including evaluating for the presence of life-threatening injuries or illnesses, management of organ system failure(s) , and complex medical decision making (to support/prevent  "further life-threatening deterioration). This time is exclusive of procedures, teaching, treating other patients, family meetings, and any prior time recorded by providers other than myself.       Risk  Prescription drug management.        ED Course as of 02/28/25 1342   Fri Feb 28, 2025   1145 Pulse(!): 122  Tachycardic   1145 Respirations(!): 24   1145 SpO2: 95 %   1248 Pulse: 92  Improved   1248 SpO2: 100 %  Improved   1307 Pt reports feeling better.  Declining another neb.  Pt reports she does not need any refills of asthma meds.       Medications   albuterol inhalation solution 10 mg (10 mg Nebulization Given 2/28/25 1205)   ipratropium (ATROVENT) 0.02 % inhalation solution 1 mg (1 mg Nebulization Given 2/28/25 1205)   sodium chloride 0.9 % inhalation solution 12 mL (12 mL Nebulization Given 2/28/25 1205)   sodium chloride 0.9 % bolus 1,000 mL (0 mL Intravenous Stopped 2/28/25 1315)   magnesium sulfate 2 g/50 mL IVPB (premix) 2 g (0 g Intravenous Stopped 2/28/25 1232)   methylPREDNISolone sodium succinate (Solu-MEDROL) injection 125 mg (125 mg Intravenous Given 2/28/25 1211)       ED Risk Strat Scores              CRAFFT      Flowsheet Row Most Recent Value   CRAFFT Initial Screen: During the past 12 months, did you:    1. Drink any alcohol (more than a few sips)?  No Filed at: 02/28/2025 1216   2. Smoke any marijuana or hashish No Filed at: 02/28/2025 1216   3. Use anything else to get high? (\"anything else\" includes illegal drugs, over the counter and prescription drugs, and things that you sniff or 'patiño')? No Filed at: 02/28/2025 1216                                          History of Present Illness       Chief Complaint   Patient presents with    Asthma     Began wheezinf 3 days ago. Pt has some, but not all Rxs at home.        Past Medical History:   Diagnosis Date    Asthma     Leukocytosis 6/21/2022    SIRS (systemic inflammatory response syndrome) (HCC) 12/16/2023      History reviewed. No pertinent " surgical history.   Family History   Problem Relation Age of Onset    Thyroid disease Mother     No Known Problems Father       Social History     Tobacco Use    Smoking status: Never    Smokeless tobacco: Never   Vaping Use    Vaping status: Never Used   Substance Use Topics    Alcohol use: Not Currently    Drug use: Not Currently      E-Cigarette/Vaping    E-Cigarette Use Never User       E-Cigarette/Vaping Substances    Nicotine No     THC No     CBD No     Flavoring No     Other No     Unknown No       I have reviewed and agree with the history as documented.     19 y.o. F w/h/o asthma (pt reports admissions in the past) p/w wheezing x 4 days.  Having cough, wheezing, chest tightness.  Using inhalers without relief.  Denies F/C, sore throat, runny nose, abd pain, N/V/D, myalgias, HA.      History provided by:  Patient   used: No    Asthma  Associated symptoms: cough, shortness of breath and wheezing    Associated symptoms: no abdominal pain, no diarrhea, no fever, no headaches, no myalgias, no nausea, no rhinorrhea, no sore throat and no vomiting        Review of Systems   Constitutional:  Negative for chills and fever.   HENT:  Negative for rhinorrhea and sore throat.    Respiratory:  Positive for cough, chest tightness, shortness of breath and wheezing.    Gastrointestinal:  Negative for abdominal pain, diarrhea, nausea and vomiting.   Musculoskeletal:  Negative for myalgias.   Neurological:  Negative for headaches.           Objective       ED Triage Vitals   Temperature Pulse Blood Pressure Respirations SpO2 Patient Position - Orthostatic VS   02/28/25 1143 02/28/25 1143 02/28/25 1143 02/28/25 1143 02/28/25 1143 02/28/25 1143   98.4 °F (36.9 °C) (!) 122 141/67 (!) 24 95 % Sitting      Temp Source Heart Rate Source BP Location FiO2 (%) Pain Score    02/28/25 1143 02/28/25 1230 02/28/25 1143 -- --    Oral Monitor Right arm        Vitals      Date and Time Temp Pulse SpO2 Resp BP Pain Score  FACES Pain Rating User   02/28/25 1300 -- 98 100 % 19 112/58 -- -- SS   02/28/25 1230 -- 92 100 % 22 116/56 -- -- SS   02/28/25 1208 -- -- 97 % -- -- -- -- AP   02/28/25 1143 98.4 °F (36.9 °C) 122 95 % 24 141/67 -- -- NG            Physical Exam  Vitals and nursing note reviewed.   Constitutional:       General: She is in acute distress.      Appearance: She is well-developed. She is not ill-appearing, toxic-appearing or diaphoretic.   HENT:      Head: Normocephalic and atraumatic.      Right Ear: Tympanic membrane normal. No drainage. No middle ear effusion. Tympanic membrane is not injected, erythematous or bulging.      Left Ear: Tympanic membrane normal. No drainage.  No middle ear effusion. Tympanic membrane is not injected, erythematous or bulging.      Nose: Nose normal.      Mouth/Throat:      Pharynx: Oropharynx is clear. Uvula midline. No pharyngeal swelling, oropharyngeal exudate, posterior oropharyngeal erythema or uvula swelling.      Tonsils: No tonsillar exudate or tonsillar abscesses.   Eyes:      General:         Right eye: No discharge.         Left eye: No discharge.      Conjunctiva/sclera: Conjunctivae normal.      Right eye: Right conjunctiva is not injected.      Left eye: Left conjunctiva is not injected.   Neck:      Trachea: Trachea and phonation normal.   Cardiovascular:      Rate and Rhythm: Regular rhythm. Tachycardia present.      Heart sounds: Normal heart sounds. No murmur heard.     No friction rub.   Pulmonary:      Effort: Tachypnea present.      Breath sounds: No stridor. Wheezing present. No rhonchi or rales.   Abdominal:      General: There is no distension.      Palpations: Abdomen is soft.      Tenderness: There is no abdominal tenderness. There is no guarding or rebound.   Musculoskeletal:      Cervical back: Normal range of motion. No rigidity.      Right lower leg: No edema.      Left lower leg: No edema.   Lymphadenopathy:      Cervical: No cervical adenopathy.   Skin:      General: Skin is warm and dry.      Coloration: Skin is not pale.      Findings: No rash.   Neurological:      Mental Status: She is alert.      GCS: GCS eye subscore is 4. GCS verbal subscore is 5. GCS motor subscore is 6.   Psychiatric:         Behavior: Behavior is cooperative.         Results Reviewed       None            No orders to display       Procedures    ED Medication and Procedure Management   Prior to Admission Medications   Prescriptions Last Dose Informant Patient Reported? Taking?   albuterol (2.5 mg/3 mL) 0.083 % nebulizer solution   No No   Sig: Take 3 mL (2.5 mg total) by nebulization every 6 (six) hours as needed for wheezing or shortness of breath   albuterol (PROVENTIL HFA,VENTOLIN HFA) 90 mcg/act inhaler   No No   Sig: Inhale 2 puffs 4 (four) times a day   cetirizine (ZyrTEC) 10 mg tablet   No No   Sig: Take 1 tablet (10 mg total) by mouth daily   escitalopram (LEXAPRO) 10 mg tablet   No No   Sig: Take 1 tablet (10 mg total) by mouth daily   fluticasone (FLONASE) 50 mcg/act nasal spray   Yes No   Si spray into each nostril daily   fluticasone-umeclidinium-vilanterol (Trelegy Ellipta) 200-62.5-25 mcg/actuation AEPB inhaler   No No   Sig: Inhale 1 puff daily Rinse mouth after use.   hydrOXYzine HCL (ATARAX) 25 mg tablet   No No   Sig: Take 1 tablet (25 mg total) by mouth every 6 (six) hours as needed for anxiety   ipratropium-albuterol (DUO-NEB) 0.5-2.5 mg/3 mL nebulizer solution   No No   Sig: Take 3 mL by nebulization 4 (four) times a day   ipratropium-albuterol (DUO-NEB) 0.5-2.5 mg/3 mL nebulizer solution   No No   Sig: Take 3 mL by nebulization 4 (four) times a day   ipratropium-albuterol (DUO-NEB) 0.5-2.5 mg/3 mL nebulizer solution   No No   Sig: Take 3 mL by nebulization 4 (four) times a day   loratadine (CLARITIN) 10 mg tablet   Yes No   Sig: Take 10 mg by mouth daily   montelukast (SINGULAIR) 10 mg tablet   No No   Sig: Take 1 tablet (10 mg total) by mouth daily at bedtime       Facility-Administered Medications: None     Discharge Medication List as of 2/28/2025  1:08 PM        START taking these medications    Details   predniSONE 20 mg tablet Take 3 tablets (60 mg total) by mouth daily for 4 days Do not start before March 1, 2025., Starting Sat 3/1/2025, Until Wed 3/5/2025, Normal           CONTINUE these medications which have NOT CHANGED    Details   albuterol (2.5 mg/3 mL) 0.083 % nebulizer solution Take 3 mL (2.5 mg total) by nebulization every 6 (six) hours as needed for wheezing or shortness of breath, Starting Wed 10/30/2024, Normal      albuterol (PROVENTIL HFA,VENTOLIN HFA) 90 mcg/act inhaler Inhale 2 puffs 4 (four) times a day, Starting Fri 11/29/2024, Normal      cetirizine (ZyrTEC) 10 mg tablet Take 1 tablet (10 mg total) by mouth daily, Starting Wed 5/22/2024, Normal      escitalopram (LEXAPRO) 10 mg tablet Take 1 tablet (10 mg total) by mouth daily, Starting u 10/31/2024, Fill Later      fluticasone (FLONASE) 50 mcg/act nasal spray 1 spray into each nostril daily, Starting Sat 6/4/2022, Historical Med      fluticasone-umeclidinium-vilanterol (Trelegy Ellipta) 200-62.5-25 mcg/actuation AEPB inhaler Inhale 1 puff daily Rinse mouth after use., Starting Sun 12/1/2024, Until Sat 3/1/2025, Normal      hydrOXYzine HCL (ATARAX) 25 mg tablet Take 1 tablet (25 mg total) by mouth every 6 (six) hours as needed for anxiety, Starting Sat 11/9/2024, Normal      !! ipratropium-albuterol (DUO-NEB) 0.5-2.5 mg/3 mL nebulizer solution Take 3 mL by nebulization 4 (four) times a day, Starting Sun 12/1/2024, Normal      !! ipratropium-albuterol (DUO-NEB) 0.5-2.5 mg/3 mL nebulizer solution Take 3 mL by nebulization 4 (four) times a day, Starting Sun 12/1/2024, Until Sat 3/1/2025, Normal      !! ipratropium-albuterol (DUO-NEB) 0.5-2.5 mg/3 mL nebulizer solution Take 3 mL by nebulization 4 (four) times a day, Starting Mon 12/30/2024, Normal      loratadine (CLARITIN) 10 mg tablet Take 10 mg by  mouth daily, Historical Med      montelukast (SINGULAIR) 10 mg tablet Take 1 tablet (10 mg total) by mouth daily at bedtime, Starting Tue 2/11/2025, Normal       !! - Potential duplicate medications found. Please discuss with provider.        No discharge procedures on file.  ED SEPSIS DOCUMENTATION   Time reflects when diagnosis was documented in both MDM as applicable and the Disposition within this note       Time User Action Codes Description Comment    2/28/2025  1:08 PM Tabitha Lei Add [J45.901] Acute asthma exacerbation                  Tabitha Lei DO  02/28/25 1348

## 2025-03-01 ENCOUNTER — HOSPITAL ENCOUNTER (INPATIENT)
Facility: HOSPITAL | Age: 20
LOS: 1 days | Discharge: HOME | End: 2025-03-02
Attending: EMERGENCY MEDICINE | Admitting: INTERNAL MEDICINE
Payer: COMMERCIAL

## 2025-03-01 ENCOUNTER — APPOINTMENT (EMERGENCY)
Dept: RADIOLOGY | Facility: HOSPITAL | Age: 20
End: 2025-03-01
Payer: COMMERCIAL

## 2025-03-01 DIAGNOSIS — J45.40 MODERATE PERSISTENT ASTHMA WITHOUT COMPLICATION: ICD-10-CM

## 2025-03-01 DIAGNOSIS — J45.51 SEVERE PERSISTENT ASTHMA WITH ACUTE EXACERBATION: ICD-10-CM

## 2025-03-01 DIAGNOSIS — J45.901 ASTHMA EXACERBATION: Primary | ICD-10-CM

## 2025-03-01 DIAGNOSIS — J45.901 ACUTE ASTHMA EXACERBATION: ICD-10-CM

## 2025-03-01 DIAGNOSIS — J82.83 EOSINOPHILIC ASTHMA: ICD-10-CM

## 2025-03-01 DIAGNOSIS — F41.9 ANXIETY: ICD-10-CM

## 2025-03-01 LAB
ALBUMIN SERPL BCG-MCNC: 4 G/DL (ref 3.5–5)
ALP SERPL-CCNC: 56 U/L (ref 34–104)
ALT SERPL W P-5'-P-CCNC: 8 U/L (ref 7–52)
ANION GAP SERPL CALCULATED.3IONS-SCNC: 7 MMOL/L (ref 4–13)
AST SERPL W P-5'-P-CCNC: 30 U/L (ref 13–39)
BASOPHILS # BLD AUTO: 0.03 THOUSANDS/ÂΜL (ref 0–0.1)
BASOPHILS NFR BLD AUTO: 0 % (ref 0–1)
BILIRUB SERPL-MCNC: 0.32 MG/DL (ref 0.2–1)
BUN SERPL-MCNC: 10 MG/DL (ref 5–25)
CALCIUM SERPL-MCNC: 9 MG/DL (ref 8.4–10.2)
CHLORIDE SERPL-SCNC: 108 MMOL/L (ref 96–108)
CO2 SERPL-SCNC: 18 MMOL/L (ref 21–32)
CREAT SERPL-MCNC: 0.62 MG/DL (ref 0.6–1.3)
EOSINOPHIL # BLD AUTO: 0.03 THOUSAND/ÂΜL (ref 0–0.61)
EOSINOPHIL NFR BLD AUTO: 0 % (ref 0–6)
ERYTHROCYTE [DISTWIDTH] IN BLOOD BY AUTOMATED COUNT: 15.8 % (ref 11.6–15.1)
FLUAV RNA RESP QL NAA+PROBE: NEGATIVE
FLUBV RNA RESP QL NAA+PROBE: NEGATIVE
GFR SERPL CREATININE-BSD FRML MDRD: 130 ML/MIN/1.73SQ M
GLUCOSE SERPL-MCNC: 120 MG/DL (ref 65–140)
HCT VFR BLD AUTO: 34.9 % (ref 34.8–46.1)
HGB BLD-MCNC: 11 G/DL (ref 11.5–15.4)
IMM GRANULOCYTES # BLD AUTO: 0.12 THOUSAND/UL (ref 0–0.2)
IMM GRANULOCYTES NFR BLD AUTO: 1 % (ref 0–2)
LYMPHOCYTES # BLD AUTO: 1.98 THOUSANDS/ÂΜL (ref 0.6–4.47)
LYMPHOCYTES NFR BLD AUTO: 13 % (ref 14–44)
MCH RBC QN AUTO: 27 PG (ref 26.8–34.3)
MCHC RBC AUTO-ENTMCNC: 31.5 G/DL (ref 31.4–37.4)
MCV RBC AUTO: 86 FL (ref 82–98)
MONOCYTES # BLD AUTO: 0.88 THOUSAND/ÂΜL (ref 0.17–1.22)
MONOCYTES NFR BLD AUTO: 6 % (ref 4–12)
NEUTROPHILS # BLD AUTO: 11.86 THOUSANDS/ÂΜL (ref 1.85–7.62)
NEUTS SEG NFR BLD AUTO: 80 % (ref 43–75)
NRBC BLD AUTO-RTO: 0 /100 WBCS
PLATELET # BLD AUTO: 348 THOUSANDS/UL (ref 149–390)
PMV BLD AUTO: 10.7 FL (ref 8.9–12.7)
POTASSIUM SERPL-SCNC: 5.5 MMOL/L (ref 3.5–5.3)
PROT SERPL-MCNC: 7.5 G/DL (ref 6.4–8.4)
RBC # BLD AUTO: 4.07 MILLION/UL (ref 3.81–5.12)
RSV RNA RESP QL NAA+PROBE: NEGATIVE
S PYO DNA THROAT QL NAA+PROBE: NOT DETECTED
SARS-COV-2 RNA RESP QL NAA+PROBE: NEGATIVE
SODIUM SERPL-SCNC: 133 MMOL/L (ref 135–147)
WBC # BLD AUTO: 14.9 THOUSAND/UL (ref 4.31–10.16)

## 2025-03-01 PROCEDURE — 94640 AIRWAY INHALATION TREATMENT: CPT

## 2025-03-01 PROCEDURE — 99291 CRITICAL CARE FIRST HOUR: CPT

## 2025-03-01 PROCEDURE — 99285 EMERGENCY DEPT VISIT HI MDM: CPT

## 2025-03-01 PROCEDURE — 80053 COMPREHEN METABOLIC PANEL: CPT

## 2025-03-01 PROCEDURE — 94644 CONT INHLJ TX 1ST HOUR: CPT

## 2025-03-01 PROCEDURE — 99239 HOSP IP/OBS DSCHRG MGMT >30: CPT | Performed by: INTERNAL MEDICINE

## 2025-03-01 PROCEDURE — 99255 IP/OBS CONSLTJ NEW/EST HI 80: CPT | Performed by: INTERNAL MEDICINE

## 2025-03-01 PROCEDURE — 94664 DEMO&/EVAL PT USE INHALER: CPT

## 2025-03-01 PROCEDURE — 94760 N-INVAS EAR/PLS OXIMETRY 1: CPT

## 2025-03-01 PROCEDURE — 71046 X-RAY EXAM CHEST 2 VIEWS: CPT

## 2025-03-01 PROCEDURE — 87651 STREP A DNA AMP PROBE: CPT

## 2025-03-01 PROCEDURE — 0241U HB NFCT DS VIR RESP RNA 4 TRGT: CPT

## 2025-03-01 PROCEDURE — 36415 COLL VENOUS BLD VENIPUNCTURE: CPT

## 2025-03-01 PROCEDURE — 85025 COMPLETE CBC W/AUTO DIFF WBC: CPT

## 2025-03-01 RX ORDER — MONTELUKAST SODIUM 10 MG/1
10 TABLET ORAL
Status: DISCONTINUED | OUTPATIENT
Start: 2025-03-01 | End: 2025-03-02 | Stop reason: HOSPADM

## 2025-03-01 RX ORDER — GUAIFENESIN/DEXTROMETHORPHAN 100-10MG/5
10 SYRUP ORAL EVERY 4 HOURS PRN
Status: DISCONTINUED | OUTPATIENT
Start: 2025-03-01 | End: 2025-03-02 | Stop reason: HOSPADM

## 2025-03-01 RX ORDER — POLYETHYLENE GLYCOL 3350 17 G/17G
17 POWDER, FOR SOLUTION ORAL DAILY PRN
Status: DISCONTINUED | OUTPATIENT
Start: 2025-03-01 | End: 2025-03-02 | Stop reason: HOSPADM

## 2025-03-01 RX ORDER — SODIUM CHLORIDE FOR INHALATION 0.9 %
12 VIAL, NEBULIZER (ML) INHALATION ONCE
Status: COMPLETED | OUTPATIENT
Start: 2025-03-01 | End: 2025-03-01

## 2025-03-01 RX ORDER — ONDANSETRON 2 MG/ML
4 INJECTION INTRAMUSCULAR; INTRAVENOUS EVERY 6 HOURS PRN
Status: DISCONTINUED | OUTPATIENT
Start: 2025-03-01 | End: 2025-03-02 | Stop reason: HOSPADM

## 2025-03-01 RX ORDER — ESCITALOPRAM OXALATE 10 MG/1
10 TABLET ORAL DAILY
Status: DISCONTINUED | OUTPATIENT
Start: 2025-03-01 | End: 2025-03-01

## 2025-03-01 RX ORDER — MONTELUKAST SODIUM 10 MG/1
10 TABLET ORAL
Qty: 30 TABLET | Refills: 0 | Status: SHIPPED | OUTPATIENT
Start: 2025-03-01 | End: 2025-03-02

## 2025-03-01 RX ORDER — FLUTICASONE FUROATE, UMECLIDINIUM BROMIDE AND VILANTEROL TRIFENATATE 200; 62.5; 25 UG/1; UG/1; UG/1
1 POWDER RESPIRATORY (INHALATION) DAILY
Qty: 180 BLISTER | Refills: 0 | Status: SHIPPED | OUTPATIENT
Start: 2025-03-01 | End: 2025-03-02

## 2025-03-01 RX ORDER — ALBUTEROL SULFATE 90 UG/1
2 INHALANT RESPIRATORY (INHALATION) EVERY 4 HOURS PRN
Status: DISCONTINUED | OUTPATIENT
Start: 2025-03-01 | End: 2025-03-02 | Stop reason: HOSPADM

## 2025-03-01 RX ORDER — ALBUTEROL SULFATE 5 MG/ML
10 SOLUTION RESPIRATORY (INHALATION) ONCE
Status: COMPLETED | OUTPATIENT
Start: 2025-03-01 | End: 2025-03-01

## 2025-03-01 RX ORDER — PREDNISONE 20 MG/1
40 TABLET ORAL DAILY
Qty: 10 TABLET | Refills: 0 | Status: SHIPPED | OUTPATIENT
Start: 2025-03-02 | End: 2025-03-02

## 2025-03-01 RX ORDER — ALBUTEROL SULFATE 0.83 MG/ML
2.5 SOLUTION RESPIRATORY (INHALATION) EVERY 6 HOURS PRN
Qty: 75 ML | Refills: 0 | Status: SHIPPED | OUTPATIENT
Start: 2025-03-01 | End: 2025-03-02

## 2025-03-01 RX ORDER — LEVALBUTEROL INHALATION SOLUTION 1.25 MG/3ML
1.25 SOLUTION RESPIRATORY (INHALATION)
Status: DISCONTINUED | OUTPATIENT
Start: 2025-03-01 | End: 2025-03-01

## 2025-03-01 RX ORDER — FLUTICASONE PROPIONATE 50 MCG
1 SPRAY, SUSPENSION (ML) NASAL DAILY
Status: DISCONTINUED | OUTPATIENT
Start: 2025-03-02 | End: 2025-03-02 | Stop reason: HOSPADM

## 2025-03-01 RX ORDER — ACETAMINOPHEN 325 MG/1
975 TABLET ORAL EVERY 6 HOURS PRN
Status: DISCONTINUED | OUTPATIENT
Start: 2025-03-01 | End: 2025-03-02 | Stop reason: HOSPADM

## 2025-03-01 RX ORDER — IPRATROPIUM BROMIDE AND ALBUTEROL SULFATE 2.5; .5 MG/3ML; MG/3ML
3 SOLUTION RESPIRATORY (INHALATION) 4 TIMES DAILY
Qty: 180 ML | Refills: 0 | Status: SHIPPED | OUTPATIENT
Start: 2025-03-01 | End: 2025-03-02

## 2025-03-01 RX ORDER — LEVALBUTEROL INHALATION SOLUTION 1.25 MG/3ML
1.25 SOLUTION RESPIRATORY (INHALATION)
Status: DISCONTINUED | OUTPATIENT
Start: 2025-03-01 | End: 2025-03-02 | Stop reason: HOSPADM

## 2025-03-01 RX ORDER — HYDROXYZINE HYDROCHLORIDE 25 MG/1
25 TABLET, FILM COATED ORAL EVERY 6 HOURS PRN
Status: DISCONTINUED | OUTPATIENT
Start: 2025-03-01 | End: 2025-03-02 | Stop reason: HOSPADM

## 2025-03-01 RX ORDER — METHYLPREDNISOLONE SODIUM SUCCINATE 40 MG/ML
40 INJECTION, POWDER, LYOPHILIZED, FOR SOLUTION INTRAMUSCULAR; INTRAVENOUS EVERY 12 HOURS SCHEDULED
Status: DISCONTINUED | OUTPATIENT
Start: 2025-03-01 | End: 2025-03-01

## 2025-03-01 RX ORDER — PREDNISONE 20 MG/1
40 TABLET ORAL DAILY
Status: DISCONTINUED | OUTPATIENT
Start: 2025-03-02 | End: 2025-03-02 | Stop reason: HOSPADM

## 2025-03-01 RX ORDER — LORATADINE 10 MG/1
10 TABLET ORAL DAILY
Status: DISCONTINUED | OUTPATIENT
Start: 2025-03-01 | End: 2025-03-01

## 2025-03-01 RX ORDER — BUDESONIDE 0.5 MG/2ML
0.5 INHALANT ORAL
Status: DISCONTINUED | OUTPATIENT
Start: 2025-03-01 | End: 2025-03-02 | Stop reason: HOSPADM

## 2025-03-01 RX ORDER — METHYLPREDNISOLONE SODIUM SUCCINATE 40 MG/ML
40 INJECTION, POWDER, LYOPHILIZED, FOR SOLUTION INTRAMUSCULAR; INTRAVENOUS EVERY 6 HOURS SCHEDULED
Status: DISCONTINUED | OUTPATIENT
Start: 2025-03-01 | End: 2025-03-01

## 2025-03-01 RX ORDER — ESCITALOPRAM OXALATE 10 MG/1
10 TABLET ORAL DAILY
COMMUNITY
End: 2025-03-02

## 2025-03-01 RX ORDER — SODIUM CHLORIDE FOR INHALATION 0.9 %
3 VIAL, NEBULIZER (ML) INHALATION
Status: DISCONTINUED | OUTPATIENT
Start: 2025-03-01 | End: 2025-03-01

## 2025-03-01 RX ADMIN — METHYLPREDNISOLONE SODIUM SUCCINATE 40 MG: 40 INJECTION, POWDER, FOR SOLUTION INTRAMUSCULAR; INTRAVENOUS at 11:14

## 2025-03-01 RX ADMIN — IPRATROPIUM BROMIDE 0.5 MG: 0.5 SOLUTION RESPIRATORY (INHALATION) at 07:21

## 2025-03-01 RX ADMIN — BUDESONIDE 0.5 MG: 0.5 INHALANT RESPIRATORY (INHALATION) at 07:21

## 2025-03-01 RX ADMIN — LEVALBUTEROL HYDROCHLORIDE 1.25 MG: 1.25 SOLUTION RESPIRATORY (INHALATION) at 13:48

## 2025-03-01 RX ADMIN — LEVALBUTEROL HYDROCHLORIDE 1.25 MG: 1.25 SOLUTION RESPIRATORY (INHALATION) at 07:21

## 2025-03-01 RX ADMIN — ISODIUM CHLORIDE 12 ML: 0.03 SOLUTION RESPIRATORY (INHALATION) at 03:04

## 2025-03-01 RX ADMIN — IPRATROPIUM BROMIDE 0.5 MG: 0.5 SOLUTION RESPIRATORY (INHALATION) at 13:48

## 2025-03-01 RX ADMIN — IPRATROPIUM BROMIDE 0.5 MG: 0.5 SOLUTION RESPIRATORY (INHALATION) at 20:30

## 2025-03-01 RX ADMIN — MONTELUKAST 10 MG: 10 TABLET, FILM COATED ORAL at 21:56

## 2025-03-01 RX ADMIN — ALBUTEROL SULFATE 10 MG: 2.5 SOLUTION RESPIRATORY (INHALATION) at 03:04

## 2025-03-01 RX ADMIN — HYDROXYZINE HYDROCHLORIDE 25 MG: 25 TABLET, FILM COATED ORAL at 17:45

## 2025-03-01 RX ADMIN — IPRATROPIUM BROMIDE 1 MG: 0.5 SOLUTION RESPIRATORY (INHALATION) at 03:04

## 2025-03-01 RX ADMIN — LEVALBUTEROL HYDROCHLORIDE 1.25 MG: 1.25 SOLUTION RESPIRATORY (INHALATION) at 20:30

## 2025-03-01 RX ADMIN — METHYLPREDNISOLONE SODIUM SUCCINATE 40 MG: 40 INJECTION, POWDER, FOR SOLUTION INTRAMUSCULAR; INTRAVENOUS at 07:04

## 2025-03-01 RX ADMIN — BUDESONIDE 0.5 MG: 0.5 INHALANT RESPIRATORY (INHALATION) at 20:30

## 2025-03-01 NOTE — DISCHARGE SUMMARY
Discharge Summary - Hospitalist   Name: Lakhwinder Puckett 19 y.o. female I MRN: [id number]  Unit/Bed#: E2 -01 I Date of Admission: 3/1/2025   Date of Service: 3/1/2025 I Hospital Day: 0     Patient became anxious and decided to remain in the hospital. She physically left on 3/2/2025- No changes in care plan.    Will have Case management meet her to discuss any new issues.    Admitting Provider:  Julia Enciso MD  Discharge Provider:  Laureano Philip DO  Admission Date: 3/1/2025       Discharge Date: 03/01/25   LOS: 0  Primary Care Physician at Discharge: Elias Schoen, [phone]    HOSPITAL COURSE:  Lakhwinder Puckett is a 19 y.o. female who presented with shortness of breath and difficulty breathing.  The patient has a past medical history is asthma.  The patient reported using her home inhaler nebulizer with minimal relief.  She was initially seen in the ER 1 day prior and treated with nebulizer and steroids and sent home with a prednisone taper.  She then returned back to the hospital due to worsening symptoms.  She reports having cough and nasal congestion.  She follows with [facility] pulmonary group and had recently been unable to fill her medications due to cost.  The patient lives at home with a cat, but reports no previous allergy to feline's.    The patient was evaluated in consultation by the pulmonary service.  Her breathing improved with nebulizer treatments as well as IV steroids and she was cleared by the pulmonary service for discharge home.    The patient reported feeling significantly improved and requested to be discharged home.    At the time of discharge the patient was tolerating oral diet they were without acute complaint and they were medically cleared for discharge.  All questions were answered the patient's satisfaction and they were in agreement with the discharge plan.    The patient, initially admitted to the hospital as inpatient, was discharged earlier than expected given the  following: Rapid unexpected improvement.  DISCHARGE DIAGNOSES  Eosinophilic asthma  Assessment & Plan  Dupixent not yet started  Maintained on Singulair.  Ran out of medication  Case management consulted  Good Rx Card given    Morbid obesity due to excess calories (HCC)  Assessment & Plan  Weight loss, exercise, dietary and lifestyle modifications    Anxiety  Assessment & Plan  No longer on lexapro    Acute asthma exacerbation  Assessment & Plan  Second ED visit today for asthma exacerbation.  Received heart neb during previous visit and given Rx for prednisone 40 mg  CXR  PTA regimen  Add DuoNeb 4 times daily, Pulmicort twice daily, IV Solu-Medrol 40 mg 4 times daily    * Severe persistent asthma with acute exacerbation  Assessment & Plan  Second ED visit today for asthma exacerbation.  Received heart neb, IV magnesium and IV Solu-Medrol 125 mg during previous visit and given Rx for prednisone 40 mg  Rapidly improved today  Patient cleared by Pulmonary for discharge  Will need local follow up.  Discharge on Prednisone 40 mg once a day for the next 5 days  Nebulizer solution refilled  Case management placed PATH referral given  [facility] information given as patient pays cash for RX      CONSULTING PROVIDERS   IP CONSULT TO CASE MANAGEMENT  IP CONSULT TO PULMONOLOGY    PROCEDURES PERFORMED  * No surgery found *    RADIOLOGY RESULTS  XR chest 2 views  Result Date: 3/1/2025  Impression: No acute cardiopulmonary disease. Workstation performed: [id number]       LABS  Results from last 7 days   Lab Units 03/01/25  0302   WBC Thousand/uL 14.90*   HEMOGLOBIN g/dL 11.0*   HEMATOCRIT % 34.9   MCV fL 86   PLATELETS Thousands/uL 348     Results from last 7 days   Lab Units 03/01/25  0302   SODIUM mmol/L 133*   POTASSIUM mmol/L 5.5*   CHLORIDE mmol/L 108   CO2 mmol/L 18*   BUN mg/dL 10   CREATININE mg/dL 0.62   CALCIUM mg/dL 9.0   ALBUMIN g/dL 4.0   TOTAL BILIRUBIN mg/dL 0.32   ALK PHOS U/L 56   ALT U/L 8   AST U/L 30   EGFR  "ml/min/1.73sq m 130   GLUCOSE RANDOM mg/dL 120                                        Cultures:         Invalid input(s): \"URIBILINOGEN\"        Results from last 7 days   Lab Units 03/01/25  0302   INFLUENZA A PCR  Negative     Results from last 7 days   Lab Units 03/01/25  0302   SARS-COV-2  Negative   INFLUENZA A PCR  Negative   INFLUENZA B PCR  Negative   RSV PCR  Negative         PHYSICAL EXAM:  Vitals:   Blood Pressure: 125/67 (03/01/25 1455)  Pulse: 78 (03/01/25 1455)  Temperature: 97.6 °F (36.4 °C) (03/01/25 1455)  Temp Source: Temporal (03/01/25 1455)  Respirations: 18 (03/01/25 1455)  Weight - Scale: 96.3 kg (212 lb 4.9 oz) (03/01/25 0234)  SpO2: 98 % (03/01/25 1455)      General: well appearing, no acute distress  HEENT: atraumatic, PERRLA, moist mucosa, normal pharynx, normal tonsils and adenoids, normal tongue, no fluid in sinuses  Neck: Trachea midline, no carotid bruit, no masses  Respiratory: normal chest wall expansion, CTA B  Cardiovascular: RRR, no m/r/g, Normal S1 and S2  Abdomen: Soft, non-tender, non-distended, normal bowel sounds in all quadrants, no hepatosplenomegaly, no tympany  Rectal: deferred  Musculoskeletal: Moves all  Integumentary: warm, dry, and pink, with no visible rash, purpura, or petechia  Heme/Lymph: no lymphadenopathy, no bruises  Neurological: Cranial Nerves II-XII grossly intact  Psychiatric: cooperative with normal mood, affect, and cognition       Discharge Disposition: Home/Self Care    AM-PAC Basic Mobility:  Basic Mobility Inpatient Raw Score: 24    JH-HLM Achieved: 8: Walk 250 feet ot more  JH-HLM Goal: 8: Walk 250 feet or more    HLM Goal listed above. Continue with ongoing physical therapy and encourage appropriate mobility to improve upon HLM goals.      Test Results Pending at Discharge:           Medications   Summary of Medication Adjustments made as a result of this hospitalization: See discharge summary and AVS for medication changes  Medication Dosing Tapers - " Please refer to Discharge Medication List for details on any medication dosing tapers (if applicable to patient).  Discharge Medication List: See after visit summary for reconciled discharge medications.     Diet restrictions:         Diet Orders   (From admission, onward)                 Start     Ordered    03/01/25 0655  Diet Regular; Regular House  Diet effective now        References:    Adult Nutrition Support Algorithm    RD Therapeutic Diet Order Protocol   Question Answer Comment   Diet Type Regular    Regular Regular House    RD to adjust diet per protocol? Yes        03/01/25 0654                  Activity restrictions: No strenuous activity  Discharge Condition: good    Outpatient Follow-Up and Discharge Instructions  See after visit summary section titled Discharge Instructions for information provided to patient and family.      Code Status: Level 1 - Full Code  Discharge Statement   I spent 86 minutes discharging the patient. This time was spent on the day of discharge. Greater than 50% of total time was spent with the patient and / or family counseling and / or coordination of care.    ** Please Note: This note was completed in part utilizing [employer] Dragon Medical One Software.  Grammatical errors, random word insertions, spelling mistakes, and incomplete sentences may be an occasional consequence of this system secondary to software limitations, ambient noise, and hardware issues.  If you have any questions or concerns about the content, text, or information contained within the body of this dictation, please contact the provider for clarification.**

## 2025-03-01 NOTE — DISCHARGE INSTR - AVS FIRST PAGE
Dear Lakhwinder Puckett,     [facility] was [facility].  It is our hope that we were always able to meet and exceed the expected standards for your care during your stay.  You were hospitalized due to Asthma exacerbation .  You were cared for on the 2nd floor under the service of Laureano Philip DO with the [facility] Group who covers for your primary care physician (PCP), Elias Schoen, MD, while you were hospitalized.  If you have any questions or concerns related to this hospitalization, you may contact us at .  For follow up, we recommend that you follow up with your primary care physician.  Please review this entire discharge summary as additional general instructions may be provided later as well.  However, at this time we provide for you here, the most important instructions / recommendations at discharge:     As stated in the follow up appointments section of this After Visit Summary, Please call to arrange a follow up with the Pulmonologist (lung doctor) for outpatient follow-up, even if feeling better.  Part of the plan of care will be to avoid additional flare ups of the COPD in the future.  Continue to take the prednisone steroid taper as indicated on the discharge medication list.  Please note that the dose is a tapering dose that starts off higher and gets to smaller and smaller doses as you go along.  Initially, you will need multiple pills to equal the daily dose recommended (for example if the pills are 10 mg pills and your initial dose is 40 mg, then 4 pills would be needed).  Please look carefully at the medication instructions.    If you begin to notice wheezing or begin to get more short of breath again, it is important that you call the lung doctor (or your PCP) as soon as possible to avoid symptoms getting so bad that you would require a rehospitalization.  I have prescribed refills of your medications  Recommend  Claritin over the counter to help with allergies as well.    Sincerely,     Laureano Philip DO, and Lucia Lutz RN

## 2025-03-01 NOTE — ASSESSMENT & PLAN NOTE
Second ED visit today for asthma exacerbation.  Received heart neb, IV magnesium and IV Solu-Medrol 125 mg during previous visit and given Rx for prednisone 40 mg  Rapidly improved today  Patient cleared by Pulmonary for discharge  Will need local follow up.  Discharge on Prednisone 40 mg once a day for the next 5 days  Nebulizer solution refilled  Case management placed PATH referral given  Tai information given as patient pays cash for RX

## 2025-03-01 NOTE — ASSESSMENT & PLAN NOTE
Second ED visit today for asthma exacerbation.  Received heart neb, IV magnesium and IV Solu-Medrol 125 mg during previous visit and given Rx for prednisone 40 mg  Status post repeat heart neb in ED  Respiratory viral panel normal    TX:  Xopenex and Atrovent QID, Pulmicort BID, IV Solu-Medrol 40 mg q6h  Followed by pulmonology at [facility]  Dupixent not approved by insurance   Case management consult for assistance with medications

## 2025-03-01 NOTE — ASSESSMENT & PLAN NOTE
Dupixent denied by insurance carrier  [facility]  Ran out of medication and cannot afford refills.  Will consult case management

## 2025-03-01 NOTE — ASSESSMENT & PLAN NOTE
No elevated eosinophils noted during this hospitalization, however does have significant previous eosinophils ([phone], [phone])  Undergoing prior authorization for dupixent. I recommended she follow up with her pulmonologist regarding dupixent as she would benefit from it

## 2025-03-01 NOTE — ASSESSMENT & PLAN NOTE
PFTs 1/2024 with resolution of prior mild obstruction.  Does have bronchodilator response.  CXR unremarkable  Covid/flu/RSV and strep A all negative  Some leukocytosis (14.90) suspect secondary to steroids  Home regimen: Trelegy 200 one puff once daily, duonebs 4x daily, albuterol HFA prn, singulair daily.  Resume this regimen upon discharge  Continue pulmicort nebs BID, atrovent/xopenex nebs TID, singulair daily  She can start prednisone 40 mg tomorrow with 10 mg taper every 3 days  Follows outpatient with [facility] pulmonology. Encouraged her to schedule a follow up with them

## 2025-03-01 NOTE — ASSESSMENT & PLAN NOTE
Second ED visit today for asthma exacerbation.  Received heart neb during previous visit and given Rx for prednisone 40 mg  CXR  PTA regimen  Add DuoNeb 4 times daily, Pulmicort twice daily, IV Solu-Medrol 40 mg 4 times daily

## 2025-03-01 NOTE — H&P
"H&P - Hospitalist   Name: Lakhwinder Puckett 19 y.o. female I MRN: [id number]  Unit/Bed#: ED-17 I Date of Admission: 3/1/2025   Date of Service: 3/1/2025 I Hospital Day: 0     Assessment & Plan  Severe persistent asthma with acute exacerbation  Second ED visit today for asthma exacerbation.  Received heart neb, IV magnesium and IV Solu-Medrol 125 mg during previous visit and given Rx for prednisone 40 mg  Status post repeat heart neb in ED  Respiratory viral panel normal    TX:  Xopenex and Atrovent QID, Pulmicort BID, IV Solu-Medrol 40 mg q6h  Followed by pulmonology at [facility]  Dupixent not approved by insurance   Case management consult for assistance with medications  Eosinophilic asthma  Dupixent denied by insurance carrier  Maintained on Singulair.  Ran out of medication and cannot afford refills.  Will consult case management  Anxiety  Lexapro 10 mg daily and as needed Atarax  Morbid obesity due to excess calories (HCC)  Weight loss, exercise, dietary and lifestyle modifications      VTE Pharmacologic Prophylaxis:   Low Risk (Score 0-2) - Encourage Ambulation.  Code Status: Prior FC  Discussion with family:  family at bedside.     Anticipated Length of Stay: Patient will be admitted on an inpatient basis with an anticipated length of stay of greater than 2 midnights secondary to severe persistent asthma exac.    History of Present Illness   Chief Complaint: \"Tight chest, throat, wheezing\"    Lakhwinder Puckett is a 19 y.o. female with a PMH as above who presents with c/o SOB, chest tightness and wheezing.  Has been using her inhaler and nebulizer twice daily at home with minimal relief. Ran out of Singulair and cannot afford refill. Seen in ED earlier, treated and discharged home. Returns tonight stating symptoms became worse.  Denies fever, cough or chills.    Patient states she has a cat for 6 years and had no issues with asthma.  Asthma began 3 years ago after COVID infection.    Review of Systems   Constitutional: " Negative.    Respiratory:  Positive for chest tightness, shortness of breath and wheezing.    Cardiovascular: Negative.    Gastrointestinal: Negative.    Genitourinary: Negative.    Musculoskeletal: Negative.    Neurological: Negative.        Historical Information   Past Medical History:   Diagnosis Date    Asthma     Leukocytosis 6/21/2022    SIRS (systemic inflammatory response syndrome) (HCC) 12/16/2023     History reviewed. No pertinent surgical history.  Social History     Tobacco Use    Smoking status: Never    Smokeless tobacco: Never   Vaping Use    Vaping status: Never Used   Substance and Sexual Activity    Alcohol use: Not Currently    Drug use: Not Currently    Sexual activity: Not on file     E-Cigarette/Vaping    E-Cigarette Use Never User      E-Cigarette/Vaping Substances    Nicotine No     THC No     CBD No     Flavoring No     Other No     Unknown No      Family History   Problem Relation Age of Onset    Thyroid disease Mother     No Known Problems Father      Social History:  Marital Status: Single   Occupation:   Patient Pre-hospital Living Situation:   Patient Pre-hospital Level of Mobility: ambulatory  Patient Pre-hospital Diet Restrictions:     Meds/Allergies   I have reviewed home medications with patient personally.  Prior to Admission medications    Medication Sig Start Date End Date Taking? Authorizing Provider   albuterol (2.5 mg/3 mL) 0.083 % nebulizer solution Take 3 mL (2.5 mg total) by nebulization every 6 (six) hours as needed for wheezing or shortness of breath 10/30/24   Opal Hogue PA-C   albuterol (PROVENTIL HFA,VENTOLIN HFA) 90 mcg/act inhaler Inhale 2 puffs 4 (four) times a day 11/29/24   Lola Gaming PA-C   cetirizine (ZyrTEC) 10 mg tablet Take 1 tablet (10 mg total) by mouth daily 5/22/24   Elias Schoen, MD   escitalopram (LEXAPRO) 10 mg tablet Take 1 tablet (10 mg total) by mouth daily 10/31/24   Elias Schoen, MD   fluticasone (FLONASE) 50 mcg/act nasal spray 1  spray into each nostril daily 6/4/22   Historical Provider, MD   fluticasone-umeclidinium-vilanterol (Trelegy Ellipta) 200-62.5-25 mcg/actuation AEPB inhaler Inhale 1 puff daily Rinse mouth after use. 12/1/24 3/1/25  Braxton Maldonado PA-C   hydrOXYzine HCL (ATARAX) 25 mg tablet Take 1 tablet (25 mg total) by mouth every 6 (six) hours as needed for anxiety 11/9/24   Yevgeniy Camacho, DO   ipratropium-albuterol (DUO-NEB) 0.5-2.5 mg/3 mL nebulizer solution Take 3 mL by nebulization 4 (four) times a day 12/1/24   Braxton Maldonado PA-C   ipratropium-albuterol (DUO-NEB) 0.5-2.5 mg/3 mL nebulizer solution Take 3 mL by nebulization 4 (four) times a day 12/1/24 3/1/25  Braxton Maldonado PA-C   ipratropium-albuterol (DUO-NEB) 0.5-2.5 mg/3 mL nebulizer solution Take 3 mL by nebulization 4 (four) times a day 12/30/24   Joanna Franklin PA-C   loratadine (CLARITIN) 10 mg tablet Take 10 mg by mouth daily    Historical Provider, MD   montelukast (SINGULAIR) 10 mg tablet Take 1 tablet (10 mg total) by mouth daily at bedtime 2/11/25   George Mccabe PA-C   predniSONE 20 mg tablet Take 3 tablets (60 mg total) by mouth daily for 4 days Do not start before March 1, 2025. 3/1/25 3/5/25  Tabitha Lei, DO     No Known Allergies    Objective :  Temp:  [98.5 °F (36.9 °C)] 98.5 °F (36.9 °C)  HR:  [74-90] 90  BP: (125)/(75) 125/75  Resp:  [16] 16  SpO2:  [96 %-99 %] 96 %  O2 Device: None (Room air)    Physical Exam  Constitutional:       General: She is not in acute distress.     Appearance: Normal appearance. She is obese. She is not ill-appearing, toxic-appearing or diaphoretic.   Eyes:      Conjunctiva/sclera: Conjunctivae normal.   Cardiovascular:      Rate and Rhythm: Regular rhythm. Tachycardia present.      Heart sounds: Normal heart sounds.   Pulmonary:      Effort: Pulmonary effort is normal.      Breath sounds: Wheezing present.      Comments: Diffuse wheezing, tachypnea  Musculoskeletal:      Right lower  leg: No edema.      Left lower leg: No edema.   Skin:     General: Skin is warm.   Neurological:      Mental Status: She is alert and oriented to person, place, and time.   Psychiatric:         Mood and Affect: Mood normal.         Behavior: Behavior normal.         Thought Content: Thought content normal.         Judgment: Judgment normal.        Lines/Drains:            Lab Results: I have reviewed the following results:  Results from last 7 days   Lab Units 03/01/25  0302   WBC Thousand/uL 14.90*   HEMOGLOBIN g/dL 11.0*   HEMATOCRIT % 34.9   PLATELETS Thousands/uL 348   SEGS PCT % 80*   LYMPHO PCT % 13*   MONO PCT % 6   EOS PCT % 0     Results from last 7 days   Lab Units 03/01/25  0302   SODIUM mmol/L 133*   POTASSIUM mmol/L 5.5*   CHLORIDE mmol/L 108   CO2 mmol/L 18*   BUN mg/dL 10   CREATININE mg/dL 0.62   ANION GAP mmol/L 7   CALCIUM mg/dL 9.0   ALBUMIN g/dL 4.0   TOTAL BILIRUBIN mg/dL 0.32   ALK PHOS U/L 56   ALT U/L 8   AST U/L 30   GLUCOSE RANDOM mg/dL 120             Lab Results   Component Value Date    HGBA1C 5.5 08/06/2021           Imaging Results Review: I reviewed radiology reports from this admission including: Echocardiogram.CXR pending  Other Study Results Review: EKG was reviewed.     Administrative Statements       ** Please Note: This note has been constructed using a voice recognition system. **

## 2025-03-01 NOTE — ASSESSMENT & PLAN NOTE
Dupixent not yet started  Maintained on Singulair.  Ran out of medication  Case management consulted  Good Rx Card given

## 2025-03-01 NOTE — PROGRESS NOTES
Discharge paperwork reviewed with patient with verbal understanding. IV removed. Patient waiting for ride home. Call bell in reach.

## 2025-03-01 NOTE — ED PROVIDER NOTES
Time reflects when diagnosis was documented in both MDM as applicable and the Disposition within this note       Time User Action Codes Description Comment    3/1/2025  4:21 AM Marc Maloney [J45.901] Asthma exacerbation           ED Disposition       ED Disposition   Admit    Condition   Stable    Date/Time   Sat Mar 1, 2025  4:21 AM    Comment   Case was discussed with JHONY Hurley and the patient's admission status was agreed to be Admission Status: inpatient status to the service of Dr. Enciso.               Assessment & Plan       Medical Decision Making  19 y.o. F w/h/o asthma (pt reports admissions in the past) p/w wheezing x 4 days.  Patient complaining of symptoms of increased cough, wheezing, chest tightness.  Using inhalers without relief.  Physical exam shows decreased breath sounds throughout all lung fields, with diffuse wheezing throughout all lung fields.  DDx including but not limited to: asthma exacerbation, URI, bronchitis, pneumonia; doubt PTX, pneumomediastinum or cardiac etiology.  Findings most consistent with asthma exacerbation.  CBC shows elevated white count at 14.9.  CMP shows mild hyperkalemia 5.5.  Chest x-ray shows no acute cardiopulmonary process.  Finding most consistent with asthma exacerbation, due to outpatient treatment failure with necessitating a second MACEDO neb upon arrival, patient increased risk for worsening asthma exacerbation.  Discussed findings with patient, patient admitted for recurrent asthma exacerbation with acute respiratory failure secondary to asthma exacerbation.  Discussed findings with internal medicine, patient admitted inpatient for further management of acute asthma exacerbation.  Patient verbalized understanding and agrees to current plan at this time.    Amount and/or Complexity of Data Reviewed  Labs: ordered.  Radiology: ordered and independent interpretation performed.    Risk  Prescription drug management.  Decision regarding  "hospitalization.             Medications   albuterol inhalation solution 10 mg (10 mg Nebulization Given 3/1/25 0304)   ipratropium (ATROVENT) 0.02 % inhalation solution 1 mg (1 mg Nebulization Given 3/1/25 0304)   sodium chloride 0.9 % inhalation solution 12 mL (12 mL Nebulization Given 3/1/25 0304)       ED Risk Strat Scores              CRAFFT      Flowsheet Row Most Recent Value   CRAFFT Initial Screen: During the past 12 months, did you:    1. Drink any alcohol (more than a few sips)?  No Filed at: 03/01/2025 0310   2. Smoke any marijuana or hashish No Filed at: 03/01/2025 0310   3. Use anything else to get high? (\"anything else\" includes illegal drugs, over the counter and prescription drugs, and things that you sniff or 'patiño')? No Filed at: 03/01/2025 0310                                          History of Present Illness       Chief Complaint   Patient presents with    Asthma     Pt reports asthma exacerbation for past 3 days. Taking neb treatments at home with minimal relief. Pt audibly wheezing and with increased respiratory effort in triage       Past Medical History:   Diagnosis Date    Asthma     Leukocytosis 6/21/2022    SIRS (systemic inflammatory response syndrome) (HCC) 12/16/2023      History reviewed. No pertinent surgical history.   Family History   Problem Relation Age of Onset    Thyroid disease Mother     No Known Problems Father       Social History     Tobacco Use    Smoking status: Never    Smokeless tobacco: Never   Vaping Use    Vaping status: Never Used   Substance Use Topics    Alcohol use: Not Currently    Drug use: Not Currently      E-Cigarette/Vaping    E-Cigarette Use Never User       E-Cigarette/Vaping Substances    Nicotine No     THC No     CBD No     Flavoring No     Other No     Unknown No       I have reviewed and agree with the history as documented.     19 y.o. F w/h/o asthma (pt reports admissions in the past) p/w wheezing x 4 days.  Patient complaining of symptoms of " increased cough, wheezing, chest tightness.  Using inhalers without relief.  Patient states that she was seen earlier today and received a heart neb which did improve her shortness of breath symptoms.  Patient states since then she has gotten gradually worse.  Patient states that she has been taking neb treatments at home with minimal relief.  Patient admits to associated nasal congestion at this time.  Patient denies any fever, bodyaches, chills, productive cough, rash, nausea, vomiting, diarrhea, or any  symptoms at this time.      Asthma  Associated symptoms: congestion, cough, rhinorrhea, shortness of breath and wheezing    Associated symptoms: no abdominal pain, no chest pain, no ear pain, no fever, no headaches, no rash, no sore throat and no vomiting        Review of Systems   Constitutional:  Negative for chills and fever.   HENT:  Positive for congestion and rhinorrhea. Negative for dental problem, ear discharge, ear pain, postnasal drip, sinus pressure, sinus pain and sore throat.    Eyes:  Negative for pain and visual disturbance.   Respiratory:  Positive for cough, chest tightness, shortness of breath and wheezing. Negative for apnea, choking and stridor.    Cardiovascular:  Negative for chest pain and palpitations.   Gastrointestinal:  Negative for abdominal pain and vomiting.   Genitourinary:  Negative for dysuria and hematuria.   Musculoskeletal:  Negative for arthralgias, back pain, neck pain and neck stiffness.   Skin:  Negative for color change and rash.   Neurological:  Negative for dizziness, seizures, syncope, weakness, light-headedness and headaches.   All other systems reviewed and are negative.          Objective       ED Triage Vitals [03/01/25 0234]   Temperature Pulse Blood Pressure Respirations SpO2 Patient Position - Orthostatic VS   98.5 °F (36.9 °C) 74 125/75 16 99 % --      Temp Source Heart Rate Source BP Location FiO2 (%) Pain Score    Oral Monitor Left arm -- 5      Vitals       Date and Time Temp Pulse SpO2 Resp BP Pain Score FACES Pain Rating User   03/01/25 0515 -- -- -- -- -- No Pain -- MC   03/01/25 0304 -- -- 96 % -- -- -- -- MS   03/01/25 0245 -- 90 97 % -- 125/75 -- -- AS   03/01/25 0234 98.5 °F (36.9 °C) 74 99 % 16 125/75 5 -- ES            Physical Exam  Vitals and nursing note reviewed.   Constitutional:       General: She is not in acute distress.     Appearance: She is well-developed. She is not ill-appearing.   HENT:      Head: Normocephalic and atraumatic.      Right Ear: Tympanic membrane and external ear normal. There is no impacted cerumen.      Left Ear: Tympanic membrane and external ear normal. There is no impacted cerumen.      Nose: Congestion and rhinorrhea present.      Mouth/Throat:      Pharynx: Posterior oropharyngeal erythema present. No oropharyngeal exudate.   Eyes:      General:         Right eye: No discharge.         Left eye: No discharge.      Extraocular Movements: Extraocular movements intact.      Conjunctiva/sclera: Conjunctivae normal.      Pupils: Pupils are equal, round, and reactive to light.   Cardiovascular:      Rate and Rhythm: Normal rate and regular rhythm.      Heart sounds: No murmur heard.  Pulmonary:      Effort: Pulmonary effort is normal. No respiratory distress.      Breath sounds: No stridor. Examination of the right-upper field reveals decreased breath sounds and wheezing. Examination of the left-upper field reveals decreased breath sounds and wheezing. Examination of the right-middle field reveals decreased breath sounds and wheezing. Examination of the left-middle field reveals decreased breath sounds and wheezing. Examination of the right-lower field reveals decreased breath sounds and wheezing. Examination of the left-lower field reveals decreased breath sounds and wheezing. Decreased breath sounds and wheezing present. No rhonchi or rales.   Chest:      Chest wall: No tenderness.   Abdominal:      General: There is no  distension.      Palpations: Abdomen is soft.      Tenderness: There is no abdominal tenderness. There is no right CVA tenderness, left CVA tenderness or guarding.   Musculoskeletal:         General: No swelling.      Cervical back: Neck supple.   Skin:     General: Skin is warm and dry.      Capillary Refill: Capillary refill takes less than 2 seconds.      Coloration: Skin is not jaundiced or pale.      Findings: No bruising, erythema, lesion or rash.   Neurological:      Mental Status: She is alert.   Psychiatric:         Mood and Affect: Mood normal.         Results Reviewed       Procedure Component Value Units Date/Time    FLU/RSV/COVID - if FLU/RSV clinically relevant (2hr TAT) [[id number]]  (Normal) Collected: 03/01/25 0302    Lab Status: Final result Specimen: Nares from Nose Updated: 03/01/25 0354     SARS-CoV-2 Negative     INFLUENZA A PCR Negative     INFLUENZA B PCR Negative     RSV PCR Negative    Narrative:      This test has been performed using the CoV-2/Flu/RSV plus assay on the [employer] GeneXpert platform. This test has been validated by the  and verified by the performing laboratory.     This test is designed to amplify and detect the following: nucleocapsid (N), envelope (E), and RNA-dependent RNA polymerase (RdRP) genes of the SARS-CoV-2 genome; matrix (M), basic polymerase (PB2), and acidic protein (PA) segments of the influenza A genome; matrix (M) and non-structural protein (NS) segments of the influenza B genome, and the nucleocapsid genes of RSV A and RSV B.     Positive results are indicative of the presence of Flu A, Flu B, RSV, and/or SARS-CoV-2 RNA. Positive results for SARS-CoV-2 or suspected novel influenza should be reported to state, local, or federal health departments according to local reporting requirements.      All results should be assessed in conjunction with clinical presentation and other laboratory markers for clinical management.     FOR PEDIATRIC PATIENTS -  copy/paste COVID Guidelines URL to browser: [email or web address]       Strep A PCR [[id number]]  (Normal) Collected: 03/01/25 0302    Lab Status: Final result Specimen: Throat Updated: 03/01/25 0341     STREP A PCR Not Detected    Comprehensive metabolic panel [[id number]]  (Abnormal) Collected: 03/01/25 0302    Lab Status: Final result Specimen: Blood from Arm, Right Updated: 03/01/25 0340     Sodium 133 mmol/L      Potassium 5.5 mmol/L      Chloride 108 mmol/L      CO2 18 mmol/L      ANION GAP 7 mmol/L      BUN 10 mg/dL      Creatinine 0.62 mg/dL      Glucose 120 mg/dL      Calcium 9.0 mg/dL      AST 30 U/L      ALT 8 U/L      Alkaline Phosphatase 56 U/L      Total Protein 7.5 g/dL      Albumin 4.0 g/dL      Total Bilirubin 0.32 mg/dL      eGFR 130 ml/min/1.73sq m     Narrative:      Specimen is moderately hemolyzed, results maybe affected  National Kid[employer] guidelines for Chronic Kidney Disease (CKD):     Stage 1 with normal or high GFR (GFR > 90 mL/min/1.73 square meters)    Stage 2 Mild CKD (GFR = 60-89 mL/min/1.73 square meters)    Stage 3A Moderate CKD (GFR = 45-59 mL/min/1.73 square meters)    Stage 3B Moderate CKD (GFR = 30-44 mL/min/1.73 square meters)    Stage 4 Severe CKD (GFR = 15-29 mL/min/1.73 square meters)    Stage 5 End Stage CKD (GFR <15 mL/min/1.73 square meters)  Note: GFR calculation is accurate only with a steady state creatinine    CBC and differential [[id number]]  (Abnormal) Collected: 03/01/25 0302    Lab Status: Final result Specimen: Blood from Arm, Right Updated: 03/01/25 0315     WBC 14.90 Thousand/uL      RBC 4.07 Million/uL      Hemoglobin 11.0 g/dL      Hematocrit 34.9 %      MCV 86 fL      MCH 27.0 pg      MCHC 31.5 g/dL      RDW 15.8 %      MPV 10.7 fL      Platelets 348 Thousands/uL      nRBC 0 /100 WBCs      Segmented % 80 %      Immature Grans % 1 %      Lymphocytes % 13 %      Monocytes % 6 %      Eosinophils  Relative 0 %      Basophils Relative 0 %      Absolute Neutrophils 11.86 Thousands/µL      Absolute Immature Grans 0.12 Thousand/uL      Absolute Lymphocytes 1.98 Thousands/µL      Absolute Monocytes 0.88 Thousand/µL      Eosinophils Absolute 0.03 Thousand/µL      Basophils Absolute 0.03 Thousands/µL             XR chest 2 views   ED Interpretation by Marc Maloney PA-C ([id number])   No acute cardiopulmonary process.          Procedures  Critical Care Time Statement: Upon my evaluation, this patient had a high probability of imminent or life-threatening deterioration due to asthma exacerbation, which required my direct attention, intervention, and personal management. I spent a total of 60 minutes directly providing critical care services, including evaluating for the presence of life-threatening injuries or illnesses, management of organ system failure(s) , and complex medical decision making (to support/prevent further life-threatening deterioration). This time is exclusive of procedures, teaching, treating other patients, family meetings, and any prior time recorded by providers other than myself.       ED Medication and Procedure Management   Prior to Admission Medications   Prescriptions Last Dose Informant Patient Reported? Taking?   albuterol (2.5 mg/3 mL) 0.083 % nebulizer solution 3/1/2025 Morning  No Yes   Sig: Take 3 mL (2.5 mg total) by nebulization every 6 (six) hours as needed for wheezing or shortness of breath   albuterol (PROVENTIL HFA,VENTOLIN HFA) 90 mcg/act inhaler 3/1/2025 Morning  No Yes   Sig: Inhale 2 puffs 4 (four) times a day   cetirizine (ZyrTEC) 10 mg tablet Not Taking  No No   Sig: Take 1 tablet (10 mg total) by mouth daily   Patient not taking: Reported on 3/1/2025   escitalopram (LEXAPRO) 10 mg tablet More than a month  Yes No   Sig: Take 10 mg by mouth daily   fluticasone (FLONASE) 50 mcg/act nasal spray 3/1/2025 Morning  Yes Yes   Si spray into each nostril daily    fluticasone-umeclidinium-vilanterol (Trelegy Ellipta) 200-62.5-25 mcg/actuation AEPB inhaler 3/1/2025 Morning  No Yes   Sig: Inhale 1 puff daily Rinse mouth after use.   hydrOXYzine HCL (ATARAX) 25 mg tablet Past Month  No Yes   Sig: Take 1 tablet (25 mg total) by mouth every 6 (six) hours as needed for anxiety   ipratropium-albuterol (DUO-NEB) 0.5-2.5 mg/3 mL nebulizer solution 2/28/2025  No Yes   Sig: Take 3 mL by nebulization 4 (four) times a day   ipratropium-albuterol (DUO-NEB) 0.5-2.5 mg/3 mL nebulizer solution Not Taking  No No   Sig: Take 3 mL by nebulization 4 (four) times a day   Patient not taking: Reported on 3/1/2025   ipratropium-albuterol (DUO-NEB) 0.5-2.5 mg/3 mL nebulizer solution Not Taking  No No   Sig: Take 3 mL by nebulization 4 (four) times a day   Patient not taking: Reported on 3/1/2025   loratadine (CLARITIN) 10 mg tablet Not Taking  Yes No   Sig: Take 10 mg by mouth daily   Patient not taking: Reported on 3/1/2025   montelukast (SINGULAIR) 10 mg tablet Past Month  No Yes   Sig: Take 1 tablet (10 mg total) by mouth daily at bedtime   predniSONE 20 mg tablet 3/1/2025 Morning  No Yes   Sig: Take 3 tablets (60 mg total) by mouth daily for 4 days Do not start before March 1, 2025.      Facility-Administered Medications: None     Current Discharge Medication List        CONTINUE these medications which have NOT CHANGED    Details   albuterol (2.5 mg/3 mL) 0.083 % nebulizer solution Take 3 mL (2.5 mg total) by nebulization every 6 (six) hours as needed for wheezing or shortness of breath  Qty: 75 mL, Refills: 0    Associated Diagnoses: Asthma exacerbation      albuterol (PROVENTIL HFA,VENTOLIN HFA) 90 mcg/act inhaler Inhale 2 puffs 4 (four) times a day  Qty: 18 g, Refills: 0    Comments: Substitution to a formulary equivalent within the same pharmaceutical class is authorized.  Associated Diagnoses: Moderate persistent asthma without complication      fluticasone (FLONASE) 50 mcg/act nasal spray  1 spray into each nostril daily    Associated Diagnoses: Moderate persistent asthma with acute exacerbation      fluticasone-umeclidinium-vilanterol (Trelegy Ellipta) 200-62.5-25 mcg/actuation AEPB inhaler Inhale 1 puff daily Rinse mouth after use.  Qty: 180 blister, Refills: 0    Associated Diagnoses: Severe persistent asthma with acute exacerbation      hydrOXYzine HCL (ATARAX) 25 mg tablet Take 1 tablet (25 mg total) by mouth every 6 (six) hours as needed for anxiety  Qty: 30 tablet, Refills: 0    Associated Diagnoses: Anxiety      !! ipratropium-albuterol (DUO-NEB) 0.5-2.5 mg/3 mL nebulizer solution Take 3 mL by nebulization 4 (four) times a day  Qty: 180 mL, Refills: 0    Associated Diagnoses: Severe persistent asthma with acute exacerbation      montelukast (SINGULAIR) 10 mg tablet Take 1 tablet (10 mg total) by mouth daily at bedtime  Qty: 20 tablet, Refills: 0    Associated Diagnoses: Severe persistent asthma with acute exacerbation      predniSONE 20 mg tablet Take 3 tablets (60 mg total) by mouth daily for 4 days Do not start before March 1, 2025.  Qty: 12 tablet, Refills: 0    Associated Diagnoses: Acute asthma exacerbation      cetirizine (ZyrTEC) 10 mg tablet Take 1 tablet (10 mg total) by mouth daily  Qty: 90 tablet, Refills: 1    Associated Diagnoses: Severe persistent asthma without complication; Eosinophilic asthma      escitalopram (LEXAPRO) 10 mg tablet Take 10 mg by mouth daily      !! ipratropium-albuterol (DUO-NEB) 0.5-2.5 mg/3 mL nebulizer solution Take 3 mL by nebulization 4 (four) times a day  Qty: 360 mL, Refills: 0    Associated Diagnoses: Severe persistent asthma with acute exacerbation      !! ipratropium-albuterol (DUO-NEB) 0.5-2.5 mg/3 mL nebulizer solution Take 3 mL by nebulization 4 (four) times a day  Qty: 360 mL, Refills: 0    Associated Diagnoses: Asthma exacerbation      loratadine (CLARITIN) 10 mg tablet Take 10 mg by mouth daily       !! - Potential duplicate medications found.  Please discuss with provider.        No discharge procedures on file.  ED SEPSIS DOCUMENTATION   Time reflects when diagnosis was documented in both MDM as applicable and the Disposition within this note       Time User Action Codes Description Comment    3/1/2025  4:21 AM Marc Maloney Add [J45.901] Asthma exacerbation                  Marc Maloney PA-C  03/01/25 0534

## 2025-03-01 NOTE — CONSULTS
Consultation - Pulmonology   Name: Lakhwinder Puckett 19 y.o. female I MRN: [id number]  Unit/Bed#: E2 -01 I Date of Admission: 3/1/2025   Date of Service: 3/1/2025 I Hospital Day: 0   Inpatient consult to Pulmonology  Consult performed by: JHONY Starkey  Consult ordered by: Laureano Philip DO        Physician Requesting Evaluation: Laureano Philip DO   Reason for Evaluation / Principal Problem: eosinophilic asthma, severe persistent asthma with AE    Assessment & Plan  Severe persistent asthma with acute exacerbation  PFTs 1/2024 with resolution of prior mild obstruction.  Does have bronchodilator response.  CXR unremarkable  Covid/flu/RSV and strep A all negative  Some leukocytosis (14.90) suspect secondary to steroids  Home regimen: Trelegy 200 one puff once daily, duonebs 4x daily, albuterol HFA prn, singulair daily.  Resume this regimen upon discharge  Continue pulmicort nebs BID, atrovent/xopenex nebs TID, singulair daily  She can start prednisone 40 mg tomorrow with 10 mg taper every 3 days  Follows outpatient with [facility] pulmonology. Encouraged her to schedule a follow up with them   Eosinophilic asthma  No elevated eosinophils noted during this hospitalization, however does have significant previous eosinophils (470, 1680)  Undergoing prior authorization for dupixent. I recommended she follow up with her pulmonologist regarding dupixent as she would benefit from it  Anxiety    Morbid obesity due to excess calories (HCC)  BMI noted at 37.61    Pulmonology service will follow.    History of Present Illness   Lakhwinder Puckett is a 19 y.o. female who presents with increased shortness of breath, chest tightness and wheezing.  Was using her home inhaler and nebulizer with minimal relief.  She was initially seen in the ED yesterday, treated with nebs and steroids and sent home with prednisone taper.  She returns today due to worsening symptoms.    Currently she states her cough, nasal congestion  and wheezing all feel much improved.  She denies any shortness of breath or chest pain.    From a pulmonary standpoint she follows with [facility] pulmonology.  Last seen November 2024.  She is maintained on Trelegy 200, DuoNebs and Singulair.  She has not been taking Singulair due to the price, however states she will come [facility] to see if it is more affordable.  There is a little bit confusion on her prior authorization for Dupixent, she states she thought it was declined by her pulmonologist, however previous pulmonology note mentions undergoing prior authorization.    She lives at home with her cat.  Does endorse having a known allergy to cats, however she states she has had her cat longer than she has had asthma and not interested in getting rid of her cat at this time.      Review of Systems   Constitutional:  Negative for chills and fever.   HENT:  Negative for congestion, ear pain and postnasal drip.    Eyes:  Negative for pain and visual disturbance.   Respiratory:  Positive for wheezing. Negative for cough and shortness of breath.    Cardiovascular:  Negative for chest pain and palpitations.   Musculoskeletal:  Negative for arthralgias and back pain.   Allergic/Immunologic: Positive for environmental allergies.   Neurological:  Negative for syncope and headaches.   All other systems reviewed and are negative.      Historical Information   Medical History Review: I have reviewed the patient's PMH, PSH, Social History, Family History, Meds, and Allergies   Historical Information   Past Medical History:   Diagnosis Date    Asthma     Leukocytosis 6/21/2022    SIRS (systemic inflammatory response syndrome) (HCC) 12/16/2023     History reviewed. No pertinent surgical history.  Social History     Tobacco Use    Smoking status: Never    Smokeless tobacco: Never   Vaping Use    Vaping status: Never Used   Substance and Sexual Activity    Alcohol use: Not Currently    Drug use: Not Currently    Sexual activity:  Not on file     E-Cigarette/Vaping    E-Cigarette Use Never User      E-Cigarette/Vaping Substances    Nicotine No     THC No     CBD No     Flavoring No     Other No     Unknown No      Family History   Problem Relation Age of Onset    Thyroid disease Mother     No Known Problems Father      Social History     Tobacco Use    Smoking status: Never    Smokeless tobacco: Never   Vaping Use    Vaping status: Never Used   Substance and Sexual Activity    Alcohol use: Not Currently    Drug use: Not Currently    Sexual activity: Not on file       Current Facility-Administered Medications:     acetaminophen (TYLENOL) tablet 975 mg, Q6H PRN    budesonide (PULMICORT) inhalation solution 0.5 mg, Q12H    dextromethorphan-guaiFENesin (ROBITUSSIN DM) oral syrup 10 mL, Q4H PRN    [START ON 3/2/2025] fluticasone (FLONASE) 50 mcg/act nasal spray 1 spray, Daily    hydrOXYzine HCL (ATARAX) tablet 25 mg, Q6H PRN    ipratropium (ATROVENT) 0.02 % inhalation solution 0.5 mg, TID    levalbuterol (XOPENEX) inhalation solution 1.25 mg, TID **AND** [DISCONTINUED] sodium chloride 0.9 % inhalation solution 3 mL, 4x Daily    methylPREDNISolone sodium succinate (Solu-MEDROL) injection 40 mg, Q12H THOMAS    montelukast (SINGULAIR) tablet 10 mg, HS    ondansetron (ZOFRAN) injection 4 mg, Q6H PRN    polyethylene glycol (MIRALAX) packet 17 g, Daily PRN  Patient has no known allergies.      Objective :  Temp:  [97.4 °F (36.3 °C)-98.5 °F (36.9 °C)] 97.4 °F (36.3 °C)  HR:  [74-90] 75  BP: (116-125)/(74-75) 116/74  Resp:  [16-18] 18  SpO2:  [96 %-100 %] 100 %  O2 Device: Aerosol mask    Physical Exam  Vitals and nursing note reviewed.   Constitutional:       General: She is not in acute distress.     Appearance: She is well-developed. She is obese.   HENT:      Head: Normocephalic and atraumatic.      Nose: Nose normal.   Eyes:      Conjunctiva/sclera: Conjunctivae normal.   Cardiovascular:      Rate and Rhythm: Normal rate and regular rhythm.   Pulmonary:       Effort: Pulmonary effort is normal. No respiratory distress.      Breath sounds: Wheezing (mild bilateral expiratory) present. No rhonchi.   Musculoskeletal:         General: No swelling.      Cervical back: Normal range of motion and neck supple.   Skin:     General: Skin is warm and dry.      Capillary Refill: Capillary refill takes less than 2 seconds.   Neurological:      General: No focal deficit present.      Mental Status: She is alert and oriented to person, place, and time.   Psychiatric:         Mood and Affect: Mood normal.           Lab Results: I have reviewed the following results:  .     03/01/25  0302   WBC 14.90*   HGB 11.0*   HCT 34.9      SODIUM 133*   K 5.5*      CO2 18*   BUN 10   CREATININE 0.62   GLUC 120   AST 30   ALT 8   ALB 4.0   TBILI 0.32   ALKPHOS 56     ABG: No new results in last 24 hours.    Imaging Results Review: I reviewed radiology reports from this admission including: chest xray.  Other Study Results Review: No additional pertinent studies reviewed.  PFT Results Reviewed: reviewed      Milady Hassan, MSN RN FNP-BC  Nurse Practitioner  [facility]

## 2025-03-01 NOTE — CASE MANAGEMENT
Case Management Assessment & Discharge Planning Note    Patient name Lakhwinder Puckett  Location East 2 /E2 -* MRN [id number]  : 2005 Date 3/1/2025       Current Admission Date: 3/1/2025  Current Admission Diagnosis:Severe persistent asthma with acute exacerbation   Patient Active Problem List    Diagnosis Date Noted Date Diagnosed    Peripheral eosinophilia 2024     Environmental allergies 2024     Eosinophilic asthma 2023     Severe persistent asthma with acute exacerbation 2023     Morbid obesity due to excess calories (HCC) 2023     Syncope 2022     Anxiety 05/10/2022     Acute asthma exacerbation 2021       LOS (days): 0  Geometric Mean LOS (GMLOS) (days):   Days to GMLOS:     OBJECTIVE:    Risk of Unplanned Readmission Score: 20.86         Current admission status: Inpatient  Referral Reason: Medication Assistance    Preferred Pharmacy:   [id number] [place], [place] - [address]  [address]  [facility] [address]  Phone: [phone] Fax: [phone]     [facility] [place] - [address]  [address] [facility] [address]  Phone: [phone] Fax: [phone]    [facility] [place], [place] - [address]  [address]  [facility] [address]  Phone: [phone] Fax: [phone]    [facility] [place], [place] - [address]  [address]  [facility] [address]  Phone: [phone] Fax: [phone]    Primary Care Provider: Elias Schoen, MD    Primary Insurance:   Secondary Insurance:     ASSESSMENT:  Active Health Care Proxies    There are no active Health Care Proxies on file.                 Readmission Root Cause  30 Day Readmission: No    Patient Information  Admitted from:: Home  Mental Status: Alert  During Assessment patient was accompanied by: Parent  Assessment information provided by:: Patient, Parent  Primary Caregiver: Self  Support Systems: Self, Parent,  Family members  County of Residence: [place]  What [facility] do you live in?: [place]  Living Arrangements: Lives w/ Parent(s)    Activities of Daily Living Prior to Admission  Functional Status: Independent  Completes ADLs independently?: Yes  Ambulates independently?: Yes  Does patient use assisted devices?: No  Does patient currently own DME?: No  Does patient have a history of Outpatient Therapy (PT/OT)?: No  Does the patient have a history of Short-Term Rehab?: No  Does patient have a history of HHC?: No  Does patient currently have HHC?: No         Patient Information Continued  Does patient have prescription coverage?: No  Does patient receive dialysis treatments?: No         Means of Transportation  Means of Transport to Appts:: Family transport      Social Determinants of Health (SDOH)      Flowsheet Row Most Recent Value   Housing Stability    In the last 12 months, was there a time when you were not able to pay the mortgage or rent on time? N   In the past 12 months, how many times have you moved where you were living? 0   At any time in the past 12 months, were you homeless or living in a shelter (including now)? N   Transportation Needs    In the past 12 months, has lack of transportation kept you from medical appointments or from getting medications? no   In the past 12 months, has lack of transportation kept you from meetings, work, or from getting things needed for daily living? No   Food Insecurity    Within the past 12 months, you worried that your food would run out before you got the money to buy more. Never true   Within the past 12 months, the food you bought just didn't last and you didn't have money to get more. Never true   Utilities    In the past 12 months has the electric, gas, oil, or water company threatened to shut off services in your home? No            DISCHARGE DETAILS:    Discharge planning discussed with:: patient and mother  Freedom of Choice: Yes  Comments - Freedom of Choice:  agreeable to follow up with PCP after discharge  CM contacted family/caregiver?: Yes  Were Treatment Team discharge recommendations reviewed with patient/caregiver?: Yes  Did patient/caregiver verbalize understanding of patient care needs?: Yes  Were patient/caregiver advised of the risks associated with not following Treatment Team discharge recommendations?: Yes    Contacts  Patient Contacts: Willian López  Relationship to Patient:: Family (mother)  Contact Method: In Person  Reason/Outcome: Continuity of Care, Discharge Planning    Requested Home Health Care         Is the patient interested in HHC at discharge?: No    DME Referral Provided  Referral made for DME?: No              Treatment Team Recommendation: Home  Discharge Destination Plan:: Home  Transport at Discharge : Family      CM met with patient at bedside. Mother Willian present. Patient resides with her mother. Patient states that she has a cat. Patient acknowledges concern of asthma with cat in the home. Patient does not have insurance but stated that she applied via [employer] CM discussed PATHS program to assist with applying for [place] insurance. Patient agreeable to referral being sent. She said that she has a PCP to follow up with. Patient pays for meds OOP. Patient may need assistance with paying for meds. CM sent an email to Hospital financial counselors to refer patient to PATHS.

## 2025-03-02 VITALS
RESPIRATION RATE: 16 BRPM | DIASTOLIC BLOOD PRESSURE: 64 MMHG | HEART RATE: 83 BPM | OXYGEN SATURATION: 99 % | BODY MASS INDEX: 37.61 KG/M2 | TEMPERATURE: 98.6 F | SYSTOLIC BLOOD PRESSURE: 130 MMHG | WEIGHT: 212.3 LBS

## 2025-03-02 LAB
ANION GAP SERPL CALCULATED.3IONS-SCNC: 7 MMOL/L (ref 4–13)
BASOPHILS # BLD AUTO: 0.04 THOUSANDS/ÂΜL (ref 0–0.1)
BASOPHILS NFR BLD AUTO: 0 % (ref 0–1)
BUN SERPL-MCNC: 10 MG/DL (ref 5–25)
CALCIUM SERPL-MCNC: 8.7 MG/DL (ref 8.4–10.2)
CHLORIDE SERPL-SCNC: 107 MMOL/L (ref 96–108)
CO2 SERPL-SCNC: 24 MMOL/L (ref 21–32)
CREAT SERPL-MCNC: 0.66 MG/DL (ref 0.6–1.3)
EOSINOPHIL # BLD AUTO: 0.06 THOUSAND/ÂΜL (ref 0–0.61)
EOSINOPHIL NFR BLD AUTO: 0 % (ref 0–6)
ERYTHROCYTE [DISTWIDTH] IN BLOOD BY AUTOMATED COUNT: 16 % (ref 11.6–15.1)
GFR SERPL CREATININE-BSD FRML MDRD: 127 ML/MIN/1.73SQ M
GLUCOSE SERPL-MCNC: 131 MG/DL (ref 65–140)
HCT VFR BLD AUTO: 34.6 % (ref 34.8–46.1)
HGB BLD-MCNC: 11 G/DL (ref 11.5–15.4)
IMM GRANULOCYTES # BLD AUTO: 0.11 THOUSAND/UL (ref 0–0.2)
IMM GRANULOCYTES NFR BLD AUTO: 1 % (ref 0–2)
LYMPHOCYTES # BLD AUTO: 3.89 THOUSANDS/ÂΜL (ref 0.6–4.47)
LYMPHOCYTES NFR BLD AUTO: 25 % (ref 14–44)
MCH RBC QN AUTO: 27.4 PG (ref 26.8–34.3)
MCHC RBC AUTO-ENTMCNC: 31.8 G/DL (ref 31.4–37.4)
MCV RBC AUTO: 86 FL (ref 82–98)
MONOCYTES # BLD AUTO: 1.24 THOUSAND/ÂΜL (ref 0.17–1.22)
MONOCYTES NFR BLD AUTO: 8 % (ref 4–12)
NEUTROPHILS # BLD AUTO: 10.28 THOUSANDS/ÂΜL (ref 1.85–7.62)
NEUTS SEG NFR BLD AUTO: 66 % (ref 43–75)
NRBC BLD AUTO-RTO: 0 /100 WBCS
PLATELET # BLD AUTO: 333 THOUSANDS/UL (ref 149–390)
PMV BLD AUTO: 10.7 FL (ref 8.9–12.7)
POTASSIUM SERPL-SCNC: 4 MMOL/L (ref 3.5–5.3)
RBC # BLD AUTO: 4.01 MILLION/UL (ref 3.81–5.12)
SODIUM SERPL-SCNC: 138 MMOL/L (ref 135–147)
WBC # BLD AUTO: 15.62 THOUSAND/UL (ref 4.31–10.16)

## 2025-03-02 PROCEDURE — 80048 BASIC METABOLIC PNL TOTAL CA: CPT | Performed by: NURSE PRACTITIONER

## 2025-03-02 PROCEDURE — 85025 COMPLETE CBC W/AUTO DIFF WBC: CPT | Performed by: NURSE PRACTITIONER

## 2025-03-02 PROCEDURE — 94760 N-INVAS EAR/PLS OXIMETRY 1: CPT

## 2025-03-02 PROCEDURE — 94640 AIRWAY INHALATION TREATMENT: CPT

## 2025-03-02 RX ORDER — PREDNISONE 20 MG/1
40 TABLET ORAL DAILY
Qty: 10 TABLET | Refills: 0 | Status: SHIPPED | OUTPATIENT
Start: 2025-03-02 | End: 2025-03-07

## 2025-03-02 RX ORDER — FLUTICASONE FUROATE, UMECLIDINIUM BROMIDE AND VILANTEROL TRIFENATATE 200; 62.5; 25 UG/1; UG/1; UG/1
1 POWDER RESPIRATORY (INHALATION) DAILY
Qty: 180 BLISTER | Refills: 0 | Status: SHIPPED | OUTPATIENT
Start: 2025-03-02 | End: 2025-05-31

## 2025-03-02 RX ORDER — IPRATROPIUM BROMIDE AND ALBUTEROL SULFATE 2.5; .5 MG/3ML; MG/3ML
3 SOLUTION RESPIRATORY (INHALATION) 4 TIMES DAILY
Qty: 180 ML | Refills: 0 | Status: SHIPPED | OUTPATIENT
Start: 2025-03-02

## 2025-03-02 RX ORDER — ALBUTEROL SULFATE 90 UG/1
2 INHALANT RESPIRATORY (INHALATION) 4 TIMES DAILY
Qty: 18 G | Refills: 0 | Status: SHIPPED | OUTPATIENT
Start: 2025-03-02

## 2025-03-02 RX ORDER — HYDROXYZINE HYDROCHLORIDE 25 MG/1
25 TABLET, FILM COATED ORAL EVERY 6 HOURS PRN
Qty: 30 TABLET | Refills: 0 | Status: SHIPPED | OUTPATIENT
Start: 2025-03-02

## 2025-03-02 RX ORDER — MONTELUKAST SODIUM 10 MG/1
10 TABLET ORAL
Qty: 30 TABLET | Refills: 0 | Status: SHIPPED | OUTPATIENT
Start: 2025-03-02 | End: 2025-04-01

## 2025-03-02 RX ORDER — ALBUTEROL SULFATE 0.83 MG/ML
2.5 SOLUTION RESPIRATORY (INHALATION) EVERY 6 HOURS PRN
Qty: 75 ML | Refills: 0 | Status: SHIPPED | OUTPATIENT
Start: 2025-03-02

## 2025-03-02 RX ADMIN — PREDNISONE 40 MG: 20 TABLET ORAL at 09:00

## 2025-03-02 RX ADMIN — BUDESONIDE 0.5 MG: 0.5 INHALANT RESPIRATORY (INHALATION) at 07:18

## 2025-03-02 RX ADMIN — IPRATROPIUM BROMIDE 0.5 MG: 0.5 SOLUTION RESPIRATORY (INHALATION) at 07:18

## 2025-03-02 RX ADMIN — LEVALBUTEROL HYDROCHLORIDE 1.25 MG: 1.25 SOLUTION RESPIRATORY (INHALATION) at 07:18

## 2025-03-02 RX ADMIN — FLUTICASONE PROPIONATE 1 SPRAY: 50 SPRAY, METERED NASAL at 09:02

## 2025-03-02 NOTE — NURSING NOTE
Patient discharged at this time. Discharge paperwork reviewed again with patient. Verbalized understanding. Offers no complaints. Left in stable condition.

## 2025-03-02 NOTE — CASE MANAGEMENT
Case Management Discharge Planning Note    Patient name Lakhwinder Puckett  Location East 2 /E2 -* MRN [id number]  : 2005 Date 3/2/2025       Current Admission Date: 3/1/2025  Current Admission Diagnosis:Severe persistent asthma with acute exacerbation   Patient Active Problem List    Diagnosis Date Noted Date Diagnosed    Peripheral eosinophilia 2024     Environmental allergies 2024     Eosinophilic asthma 2023     Severe persistent asthma with acute exacerbation 2023     Morbid obesity due to excess calories (HCC) 2023     Syncope 2022     Anxiety 05/10/2022     Acute asthma exacerbation 2021       LOS (days): 1  Geometric Mean LOS (GMLOS) (days):   Days to GMLOS:     OBJECTIVE:  Risk of Unplanned Readmission Score: 20.84         Current admission status: Inpatient   Preferred Pharmacy:   [id number] [place], [place] - [address]  [address]  [facility] [address]  Phone: [phone] Fax: [phone]     [facility] [place] - [address]  [address] [facility] [address]  Phone: [phone] Fax: [phone]    [facility] [place], [place] - [address]  [address]  [facility] [address]  Phone: [phone] Fax: [phone]    [facility] [place], [place] - [address]  [address]  [facility] [address]  Phone: [phone] Fax: [phone]    [facility] [place], [place] - [address]  [facility],  [address]  [facility],  First Floor [facility] [address]  Phone: [phone] Fax: [phone]    Primary Care Provider: Elias Schoen, MD    Primary Insurance:   Secondary Insurance:     DISCHARGE DETAILS:    Discharge planning discussed with:: Pt                                     Other Referral/Resources/Interventions Provided:  Financial Resources Provided: Indigent Medication, Prescription Discount Card    Would you like to participate in our [facility] service program?  : Yes                                                 Additional Comments: CM met with pt at bedside. Pt stated she could not cover her prescriptions. Pt was referred to PATHs yesterday but has not been processed yet to know eligilibilty. Prescription was sent to [facility] to be filled. Pricing came back at 674.97. CM reached out to Director and CC for cost approval. CC approved. FAESR was filled out and delivered to pharmacy. Pt was also advised to followup with PCP and was given [facility] information. CM will continue to provide support.

## 2025-03-02 NOTE — PLAN OF CARE
Problem: Potential for Falls  Goal: Patient will remain free of falls  Description: INTERVENTIONS:  - Educate patient/family on patient safety including physical limitations  - Instruct patient to call for assistance with activity   - Consult OT/PT to assist with strengthening/mobility   - Keep Call bell within reach  - Keep bed low and locked with side rails adjusted as appropriate  - Keep care items and personal belongings within reach  - Initiate and maintain comfort rounds  - Make Fall Risk Sign visible to staff  - Apply yellow socks and bracelet for high fall risk patients  - Consider moving patient to room near nurses station  3/1/2025 1901 by Lucia Lutz, RN  Outcome: Completed  3/1/2025 1030 by Lucia Lutz, RN  Outcome: Progressing

## 2025-03-03 ENCOUNTER — TELEPHONE (OUTPATIENT)
Age: 20
End: 2025-03-03

## 2025-03-03 ENCOUNTER — PATIENT OUTREACH (OUTPATIENT)
Dept: FAMILY MEDICINE CLINIC | Facility: CLINIC | Age: 20
End: 2025-03-03

## 2025-03-03 NOTE — TELEPHONE ENCOUNTER
Attempted to contact patient per referral as she would be in need of a 2 week hospital follow up visit. I left a voicemail for patient to return call and schedule if interested. Patient does have an upcomming appointment with [facility] on 4/30/25. Thank you.

## 2025-03-03 NOTE — UTILIZATION REVIEW
Initial Clinical Review    Admission: Date/Time/Statement:   Admission Orders (From admission, onward)       Ordered        03/01/25 0421  INPATIENT ADMISSION  Once                          Orders Placed This Encounter   Procedures    INPATIENT ADMISSION     Standing Status:   Standing     Number of Occurrences:   1     Level of Care:   Med Surg [16]     Estimated length of stay:   More than 2 Midnights     Certification:   I certify that inpatient services are medically necessary for this patient for a duration of greater than two midnights. See H&P and MD Progress Notes for additional information about the patient's course of treatment.     ED Arrival Information       Expected   -    Arrival   3/1/2025 02:19    Acuity   Urgent              Means of arrival   Walk-In    Escorted by   Self    Service   Hospitalist    Admission type   Emergency              Arrival complaint   Asthma Attack             Chief Complaint   Patient presents with    Asthma     Pt reports asthma exacerbation for past 3 days. Taking neb treatments at home with minimal relief. Pt audibly wheezing and with increased respiratory effort in triage       Initial Presentation: 19 y.o. female presents to ED from home with shortness of breath, wheezing and chest tightness.  Has been using her inhaler and  nebulizer  Twice daily with minimal improvement. Ran out of singular and unable to afford it.  Seen in ED earlier, treated and discharged home.  Symptoms  worsened.  PMH is anxiety, asthma and COVID.  Admit  IP with  Severe persistent asthma  with acute  exacerbation  and plan is   IV  s/medrol, nebulizers and observe.    Pulmonary consult  Unsure what triggered asthma attack.   Has  known severe asthma.   Feels back to baseline.  Continue nebulizers and  transition to prednisone.    Anticipated Length of Stay/Certification Statement: Patient will be admitted on an inpatient basis with an anticipated length of stay of greater than 2 midnights  secondary to severe persistent asthma exac.       ED Treatment-Medication Administration from 03/01/2025 0219 to 03/01/2025 0513         Date/Time Order Dose Route Action     03/01/2025 0304 albuterol inhalation solution 10 mg 10 mg Nebulization Given     03/01/2025 0304 ipratropium (ATROVENT) 0.02 % inhalation solution 1 mg 1 mg Nebulization Given     03/01/2025 0304 sodium chloride 0.9 % inhalation solution 12 mL 12 mL Nebulization Given            Scheduled Medications:  Pulmicort Q 12 hrs  Atrovent  TID  Flonase daily  Lexapro daily  Atrovent QID  Xopenex  TID  Claritin daily  IV  s/medrol Q 12   Singular daily      Continuous IV Infusions:      PRN Meds:    ED Triage Vitals [03/01/25 0234]   Temperature Pulse Respirations Blood Pressure SpO2 Pain Score   98.5 °F (36.9 °C) 74 16 125/75 99 % 5     Weight (last 2 days) before discharge       Date/Time Weight    03/01/25 0234 96.3 (212.3)            Vital Signs (last 3 days) before discharge       Date/Time Temp Pulse Resp BP MAP (mmHg) SpO2 O2 Device Patient Position - Orthostatic VS Pain    03/02/25 0825 98.6 °F (37 °C) 83 16 130/64 84 99 % None (Room air) Sitting --    03/02/25 0720 -- -- -- -- -- -- None (Room air) -- --    03/01/25 2344 97.8 °F (36.6 °C) 75 18 120/62 73 99 % None (Room air) Sitting --    03/01/25 2031 -- -- -- -- -- 100 % None (Room air) -- --    03/01/25 2015 -- -- -- -- -- -- -- -- No Pain    03/01/25 1455 97.6 °F (36.4 °C) 78 18 125/67 73 98 % None (Room air) Sitting --    03/01/25 1350 -- -- -- -- -- 100 % -- -- --    03/01/25 1030 -- -- -- -- -- -- -- -- No Pain    03/01/25 0805 97.4 °F (36.3 °C) 75 18 116/74 -- 100 % Aerosol mask Sitting --    03/01/25 0724 -- -- -- -- -- 96 % None (Room air) -- --    03/01/25 0515 -- -- -- -- -- -- -- -- No Pain    03/01/25 0304 -- -- -- -- -- 96 % -- -- --    03/01/25 0245 -- 90 -- 125/75 92 97 % None (Room air) -- --    03/01/25 0234 98.5 °F (36.9 °C) 74 16 125/75 -- 99 % None (Room air) -- 5               Pertinent Labs/Diagnostic Test Results:   Radiology:  XR chest 2 views   ED Interpretation by Marc Maloney PA-C (03/01 0528)   No acute cardiopulmonary process.      Final Interpretation by Kevan Morales MD (03/01 1200)      No acute cardiopulmonary disease.            Workstation performed: [id number]           Results from last 7 days   Lab Units 03/01/25  0302   SARS-COV-2  Negative     Results from last 7 days   Lab Units 03/02/25  0441 03/01/25  0302   WBC Thousand/uL 15.62* 14.90*   HEMOGLOBIN g/dL 11.0* 11.0*   HEMATOCRIT % 34.6* 34.9   PLATELETS Thousands/uL 333 348   TOTAL NEUT ABS Thousands/µL 10.28* 11.86*         Results from last 7 days   Lab Units 03/02/25  0441 03/01/25  0302   SODIUM mmol/L 138 133*   POTASSIUM mmol/L 4.0 5.5*   CHLORIDE mmol/L 107 108   CO2 mmol/L 24 18*   ANION GAP mmol/L 7 7   BUN mg/dL 10 10   CREATININE mg/dL 0.66 0.62   EGFR ml/min/1.73sq m 127 130   CALCIUM mg/dL 8.7 9.0     Results from last 7 days   Lab Units 03/01/25  0302   AST U/L 30   ALT U/L 8   ALK PHOS U/L 56   TOTAL PROTEIN g/dL 7.5   ALBUMIN g/dL 4.0   TOTAL BILIRUBIN mg/dL 0.32         Results from last 7 days   Lab Units 03/02/25  0441 03/01/25  0302   GLUCOSE RANDOM mg/dL 131 120           Results from last 7 days   Lab Units 03/01/25  0302   INFLUENZA A PCR  Negative   INFLUENZA B PCR  Negative   RSV PCR  Negative               Present on Admission:   Eosinophilic asthma   Morbid obesity due to excess calories (HCC)   Severe persistent asthma with acute exacerbation   Anxiety      Admitting Diagnosis: Asthma exacerbation [J45.901]  Age/Sex: 19 y.o. female    Network Utilization Review Department  ATTENTION: Please call with any questions or concerns to [phone] and carefully listen to the prompts so that you are directed to the right person. All voicemails are confidential.   For Discharge needs, contact Care Management DC Support Team at [phone] opt. [phone]  Send all requests for admission clinical  reviews, approved or denied determinations and any other requests to dedicated fax number below belonging to the campus where the patient is receiving treatment. List of dedicated fax numbers for the Facilities:  FACILITY NAME UR FAX NUMBER   ADMISSION DENIALS (Administrative/Medical Necessity) [phone]   DISCHARGE SUPPORT TEAM (NETWORK) [phone]   PARENT CHILD HEALTH (Maternity/NICU/Pediatrics) [phone]   [facility] [phone]   [facility] [phone]   Critical access hospital [phone]   [facility] [phone]   [facility] [phone]   [facility] [phone]   [facility] [phone]   [facility] [phone]   [facility] [phone]   [facility] [phone]   [facility] [phone]   [facility] [phone]

## 2025-03-04 ENCOUNTER — TRANSITIONAL CARE MANAGEMENT (OUTPATIENT)
Dept: FAMILY MEDICINE CLINIC | Facility: CLINIC | Age: 20
End: 2025-03-04

## 2025-03-06 ENCOUNTER — PATIENT OUTREACH (OUTPATIENT)
Dept: FAMILY MEDICINE CLINIC | Facility: CLINIC | Age: 20
End: 2025-03-06

## 2025-03-13 ENCOUNTER — PATIENT OUTREACH (OUTPATIENT)
Dept: FAMILY MEDICINE CLINIC | Facility: CLINIC | Age: 20
End: 2025-03-13

## 2025-03-13 NOTE — LETTER
Date: 03/13/25    Dear Lakhwinder Puckett,   My name is Rosalina Young; I am a registered nurse care manager working with [address] [address]   I have not been able to reach you and would like to set a time that I can talk with you over the phone or in-person.  My work is to help patients that have complex medical conditions get the care they need. This includes patients who may have been in the hospital or emergency room.    Please call me with any questions you may have. I look forward to meeting with you.  Sincerely,  Rosalina Young    Outpatient Care Manager

## 2025-03-13 NOTE — PROGRESS NOTES
Outpatient RN Care Manager Note    RN CM received HRR patient referral due to hospital admission from 3/1 to 3/2 due to severe persistent asthma. Chart and discharge instructions reviewed.    Second call to  patient with no answer. Left message, this is Rosalina the Outpatient Nurse Care Manager at [facility] office calling to follow up with you. Requested return phone call at [phone].      UTR letter sent to My Chart.  Will close to complex care management and re-open upon patient engagement.

## 2025-04-10 ENCOUNTER — HOSPITAL ENCOUNTER (EMERGENCY)
Facility: HOSPITAL | Age: 20
Discharge: HOME | End: 2025-04-10
Attending: EMERGENCY MEDICINE

## 2025-04-10 VITALS
DIASTOLIC BLOOD PRESSURE: 68 MMHG | BODY MASS INDEX: 38.51 KG/M2 | WEIGHT: 217.37 LBS | TEMPERATURE: 99.2 F | OXYGEN SATURATION: 100 % | RESPIRATION RATE: 18 BRPM | HEART RATE: 108 BPM | SYSTOLIC BLOOD PRESSURE: 111 MMHG

## 2025-04-10 DIAGNOSIS — J45.901 ASTHMA EXACERBATION: Primary | ICD-10-CM

## 2025-04-10 PROCEDURE — 0241U HB NFCT DS VIR RESP RNA 4 TRGT: CPT

## 2025-04-10 PROCEDURE — 94640 AIRWAY INHALATION TREATMENT: CPT

## 2025-04-10 PROCEDURE — 99284 EMERGENCY DEPT VISIT MOD MDM: CPT

## 2025-04-10 PROCEDURE — 99283 EMERGENCY DEPT VISIT LOW MDM: CPT

## 2025-04-10 RX ORDER — PREDNISONE 20 MG/1
40 TABLET ORAL DAILY
Qty: 8 TABLET | Refills: 0 | Status: SHIPPED | OUTPATIENT
Start: 2025-04-10 | End: 2025-04-14

## 2025-04-10 RX ORDER — ALBUTEROL SULFATE 0.83 MG/ML
5 SOLUTION RESPIRATORY (INHALATION) ONCE
Status: COMPLETED | OUTPATIENT
Start: 2025-04-10 | End: 2025-04-10

## 2025-04-10 RX ADMIN — PREDNISONE 50 MG: 20 TABLET ORAL at 07:01

## 2025-04-10 RX ADMIN — IPRATROPIUM BROMIDE 0.5 MG: 0.5 SOLUTION RESPIRATORY (INHALATION) at 06:23

## 2025-04-10 RX ADMIN — ALBUTEROL SULFATE 5 MG: 2.5 SOLUTION RESPIRATORY (INHALATION) at 06:23

## 2025-04-10 NOTE — ED PROVIDER NOTES
Time reflects when diagnosis was documented in both MDM as applicable and the Disposition within this note       Time User Action Codes Description Comment    4/10/2025  7:40 AM Amy Purdy Add [J45.901] Asthma exacerbation           ED Disposition       ED Disposition   Discharge    Condition   Stable    Date/Time   u Apr 10, 2025  7:40 AM    Comment   Lakhwinder Puckett discharge to home/self care.                   Assessment & Plan       Medical Decision Making  Wheezing improved with DuoNeb here and dose of steroids. Will prescribe steroids for 4 more days.     I have discussed the plan to discharge pt from ED. The patient was discharged in stable condition.  Patient ambulated off the department.  Extensive return to emergency department precautions were discussed.  Follow up with appropriate providers including primary care physician was discussed.  Patient and/or their  primary decision maker expressed understanding.  Patient remained stable during entire emergency department stay.      Risk  Prescription drug management.             Medications   albuterol inhalation solution 5 mg (5 mg Nebulization Given 4/10/25 0623)     And   ipratropium (ATROVENT) 0.02 % inhalation solution 0.5 mg (0.5 mg Nebulization Given 4/10/25 0623)   predniSONE tablet 50 mg (50 mg Oral Given 4/10/25 0701)       ED Risk Strat Scores                    No data recorded                            History of Present Illness       Chief Complaint   Patient presents with    Asthma     Pt reports wheezing beginning at 0300, used nebulizer and rescue inhaler with no relief. Audible expiratory and inspiratory wheezing during triage.       Past Medical History:   Diagnosis Date    Asthma     Leukocytosis 6/21/2022    SIRS (systemic inflammatory response syndrome) (HCC) 12/16/2023      History reviewed. No pertinent surgical history.   Family History   Problem Relation Age of Onset    Thyroid disease Mother     No Known Problems Father        Social History     Tobacco Use    Smoking status: Never    Smokeless tobacco: Never   Vaping Use    Vaping status: Never Used   Substance Use Topics    Alcohol use: Not Currently    Drug use: Not Currently      E-Cigarette/Vaping    E-Cigarette Use Never User       E-Cigarette/Vaping Substances    Nicotine No     THC No     CBD No     Flavoring No     Other No     Unknown No       I have reviewed and agree with the history as documented.     19 YOF with PMH asthma presents today with complaint of wheezing that woke her up around 0300 today. Reports that she tried a neb treatment at home and her rescue inhaler with no relief. Reports associated cough, congestion and chest tightness.         Review of Systems   HENT:  Positive for congestion.    Respiratory:  Positive for cough, shortness of breath and wheezing.    All other systems reviewed and are negative.          Objective       ED Triage Vitals   Temperature Pulse Blood Pressure Respirations SpO2 Patient Position - Orthostatic VS   04/10/25 0623 04/10/25 0620 04/10/25 0620 04/10/25 0620 04/10/25 0620 04/10/25 0620   99.2 °F (37.3 °C) (S) (!) 114 111/68 18 97 % Sitting      Temp Source Heart Rate Source BP Location FiO2 (%) Pain Score    04/10/25 0623 04/10/25 0620 04/10/25 0620 -- 04/10/25 0620    Oral Monitor Right arm  No Pain      Vitals      Date and Time Temp Pulse SpO2 Resp BP Pain Score FACES Pain Rating User   04/10/25 0645 -- 108 100 % -- -- -- -- KAM   04/10/25 0623 99.2 °F (37.3 °C) -- -- -- -- -- -- DF   04/10/25 0620 --  114 97 % 18 111/68 No Pain -- DF            Physical Exam  Vitals and nursing note reviewed.   Constitutional:       General: She is not in acute distress.     Appearance: Normal appearance. She is well-developed.   HENT:      Head: Normocephalic and atraumatic.   Eyes:      Conjunctiva/sclera: Conjunctivae normal.   Cardiovascular:      Rate and Rhythm: Normal rate and regular rhythm.      Heart sounds: No murmur  heard.  Pulmonary:      Effort: Pulmonary effort is normal. No respiratory distress.      Breath sounds: Wheezing present.   Abdominal:      Palpations: Abdomen is soft.      Tenderness: There is no abdominal tenderness.   Musculoskeletal:         General: No swelling.      Cervical back: Normal range of motion and neck supple.   Skin:     General: Skin is warm and dry.   Neurological:      Mental Status: She is alert.   Psychiatric:         Mood and Affect: Mood normal.         Behavior: Behavior normal.         Results Reviewed       Procedure Component Value Units Date/Time    FLU/RSV/COVID - if FLU/RSV clinically relevant (2hr TAT) [[id number]]  (Normal) Collected: 04/10/25 0701    Lab Status: Final result Specimen: Nares from Nose Updated: 04/10/25 0806     SARS-CoV-2 Negative     INFLUENZA A PCR Negative     INFLUENZA B PCR Negative     RSV PCR Negative    Narrative:      This test has been performed using the CoV-2/Flu/RSV plus assay on the [employer] GeneXpert platform. This test has been validated by the  and verified by the performing laboratory.     This test is designed to amplify and detect the following: nucleocapsid (N), envelope (E), and RNA-dependent RNA polymerase (RdRP) genes of the SARS-CoV-2 genome; matrix (M), basic polymerase (PB2), and acidic protein (PA) segments of the influenza A genome; matrix (M) and non-structural protein (NS) segments of the influenza B genome, and the nucleocapsid genes of RSV A and RSV B.     Positive results are indicative of the presence of Flu A, Flu B, RSV, and/or SARS-CoV-2 RNA. Positive results for SARS-CoV-2 or suspected novel influenza should be reported to state, local, or federal health departments according to local reporting requirements.      All results should be assessed in conjunction with clinical presentation and other laboratory markers for clinical management.     FOR PEDIATRIC PATIENTS - copy/paste COVID Guidelines URL to browser:  [email or web address]               No orders to display       Procedures    ED Medication and Procedure Management   Prior to Admission Medications   Prescriptions Last Dose Informant Patient Reported? Taking?   albuterol (2.5 mg/3 mL) 0.083 % nebulizer solution   No No   Sig: Take 3 mL (2.5 mg total) by nebulization every 6 (six) hours as needed for wheezing or shortness of breath   albuterol (PROVENTIL HFA,VENTOLIN HFA) 90 mcg/act inhaler   No No   Sig: Inhale 2 puffs 4 (four) times a day   fluticasone (FLONASE) 50 mcg/act nasal spray   Yes No   Si spray into each nostril daily   fluticasone-umeclidinium-vilanterol (Trelegy Ellipta) 200-62.5-25 mcg/actuation AEPB inhaler   No No   Sig: Inhale 1 puff daily Rinse mouth after use.   hydrOXYzine HCL (ATARAX) 25 mg tablet   No No   Sig: Take 1 tablet (25 mg total) by mouth every 6 (six) hours as needed for anxiety   ipratropium-albuterol (DUO-NEB) 0.5-2.5 mg/3 mL nebulizer solution   No No   Sig: Take 3 mL by nebulization 4 (four) times a day   montelukast (SINGULAIR) 10 mg tablet   No No   Sig: Take 1 tablet (10 mg total) by mouth daily at bedtime      Facility-Administered Medications: None     Discharge Medication List as of 4/10/2025  7:40 AM        START taking these medications    Details   predniSONE 20 mg tablet Take 2 tablets (40 mg total) by mouth daily for 4 days, Starting u 4/10/2025, Until Mon 2025, Normal           CONTINUE these medications which have NOT CHANGED    Details   albuterol (2.5 mg/3 mL) 0.083 % nebulizer solution Take 3 mL (2.5 mg total) by nebulization every 6 (six) hours as needed for wheezing or shortness of breath, Starting Sun 3/2/2025, Normal      albuterol (PROVENTIL HFA,VENTOLIN HFA) 90 mcg/act inhaler Inhale 2 puffs 4 (four) times a day, Starting Sun 3/2/2025, Normal      fluticasone (FLONASE) 50 mcg/act nasal spray 1 spray into each nostril daily, Starting Sat  6/4/2022, Historical Med      fluticasone-umeclidinium-vilanterol (Trelegy Ellipta) 200-62.5-25 mcg/actuation AEPB inhaler Inhale 1 puff daily Rinse mouth after use., Starting Sun 3/2/2025, Until Sat 5/31/2025, Normal      hydrOXYzine HCL (ATARAX) 25 mg tablet Take 1 tablet (25 mg total) by mouth every 6 (six) hours as needed for anxiety, Starting Sun 3/2/2025, Normal      ipratropium-albuterol (DUO-NEB) 0.5-2.5 mg/3 mL nebulizer solution Take 3 mL by nebulization 4 (four) times a day, Starting Sun 3/2/2025, Normal      montelukast (SINGULAIR) 10 mg tablet Take 1 tablet (10 mg total) by mouth daily at bedtime, Starting Sun 3/2/2025, Until Tue 4/1/2025, Normal           No discharge procedures on file.  ED SEPSIS DOCUMENTATION   Time reflects when diagnosis was documented in both MDM as applicable and the Disposition within this note       Time User Action Codes Description Comment    4/10/2025  7:40 AM Amy Purdy Add [J45.901] Asthma exacerbation                  Amy Purdy PA-C  04/10/25 0819

## 2025-04-20 ENCOUNTER — HOSPITAL ENCOUNTER (EMERGENCY)
Facility: HOSPITAL | Age: 20
Discharge: HOME | End: 2025-04-20
Attending: EMERGENCY MEDICINE

## 2025-04-20 VITALS
HEART RATE: 111 BPM | TEMPERATURE: 97.9 F | BODY MASS INDEX: 39.37 KG/M2 | DIASTOLIC BLOOD PRESSURE: 71 MMHG | WEIGHT: 222.22 LBS | SYSTOLIC BLOOD PRESSURE: 99 MMHG | RESPIRATION RATE: 19 BRPM | OXYGEN SATURATION: 95 %

## 2025-04-20 DIAGNOSIS — J45.901 ASTHMA EXACERBATION: Primary | ICD-10-CM

## 2025-04-20 PROCEDURE — 99284 EMERGENCY DEPT VISIT MOD MDM: CPT | Performed by: EMERGENCY MEDICINE

## 2025-04-20 PROCEDURE — 99283 EMERGENCY DEPT VISIT LOW MDM: CPT

## 2025-04-20 PROCEDURE — 94640 AIRWAY INHALATION TREATMENT: CPT

## 2025-04-20 RX ORDER — MONTELUKAST SODIUM 10 MG/1
10 TABLET ORAL
Qty: 30 TABLET | Refills: 0 | Status: SHIPPED | OUTPATIENT
Start: 2025-04-20

## 2025-04-20 RX ORDER — DIPHENHYDRAMINE HCL 25 MG
50 TABLET ORAL ONCE
Status: COMPLETED | OUTPATIENT
Start: 2025-04-20 | End: 2025-04-20

## 2025-04-20 RX ORDER — PREDNISONE 20 MG/1
40 TABLET ORAL ONCE
Status: COMPLETED | OUTPATIENT
Start: 2025-04-20 | End: 2025-04-20

## 2025-04-20 RX ORDER — ALBUTEROL SULFATE 0.83 MG/ML
5 SOLUTION RESPIRATORY (INHALATION) ONCE
Status: COMPLETED | OUTPATIENT
Start: 2025-04-20 | End: 2025-04-20

## 2025-04-20 RX ORDER — PREDNISONE 20 MG/1
40 TABLET ORAL DAILY
Qty: 8 TABLET | Refills: 0 | Status: SHIPPED | OUTPATIENT
Start: 2025-04-20 | End: 2025-04-21 | Stop reason: CLARIF

## 2025-04-20 RX ADMIN — IPRATROPIUM BROMIDE 0.5 MG: 0.5 SOLUTION RESPIRATORY (INHALATION) at 17:00

## 2025-04-20 RX ADMIN — PREDNISONE 40 MG: 20 TABLET ORAL at 16:59

## 2025-04-20 RX ADMIN — DIPHENHYDRAMINE HYDROCHLORIDE 50 MG: 25 TABLET ORAL at 16:59

## 2025-04-20 RX ADMIN — ALBUTEROL SULFATE 5 MG: 2.5 SOLUTION RESPIRATORY (INHALATION) at 17:00

## 2025-04-20 NOTE — ED PROVIDER NOTES
ED Disposition       None          Assessment & Plan   {Hyperlinks  Risk Stratification - NIHSS - HEART SCORE - Fill out sepsis note and make sure you call [phone] if severe or septic shock:[id number]}    Medical Decision Making  Risk  OTC drugs.  Prescription drug management.             Medications   albuterol inhalation solution 5 mg (5 mg Nebulization Given 4/20/25 1700)   ipratropium (ATROVENT) 0.02 % inhalation solution 0.5 mg (0.5 mg Nebulization Given 4/20/25 1700)   predniSONE tablet 40 mg (40 mg Oral Given 4/20/25 1659)   diphenhydrAMINE (BENADRYL) tablet 50 mg (50 mg Oral Given 4/20/25 1659)       ED Risk Strat Scores                    No data recorded                            History of Present Illness   {Hyperlinks  History (Med, Surg, Fam, Social) - Current Medications - Allergies  :[id number]}    Chief Complaint   Patient presents with    Asthma     Pt came in for inc wheezing and chest tightness for a couple of days. Used nebs and inhaler at home no relief.       Past Medical History:   Diagnosis Date    Asthma     Leukocytosis 6/21/2022    SIRS (systemic inflammatory response syndrome) (HCC) 12/16/2023      History reviewed. No pertinent surgical history.   Family History   Problem Relation Age of Onset    Thyroid disease Mother     No Known Problems Father       Social History     Tobacco Use    Smoking status: Never    Smokeless tobacco: Never   Vaping Use    Vaping status: Never Used   Substance Use Topics    Alcohol use: Not Currently    Drug use: Not Currently      E-Cigarette/Vaping    E-Cigarette Use Never User       E-Cigarette/Vaping Substances    Nicotine No     THC No     CBD No     Flavoring No     Other No     Unknown No       I have reviewed and agree with the history as documented.       Asthma      Review of Systems        Objective   {Hyperlinks  Historical Vitals - Historical Labs - Chart Review/Microbiology - Last Echo - Code Status  :[id number]}    ED Triage Vitals [04/20/25  1635]   Temperature Pulse Blood Pressure Respirations SpO2 Patient Position - Orthostatic VS   97.9 °F (36.6 °C) (!) 111 99/71 19 95 % --      Temp Source Heart Rate Source BP Location FiO2 (%) Pain Score    Oral Monitor -- -- --      Vitals      Date and Time Temp Pulse SpO2 Resp BP Pain Score FACES Pain Rating User   25 1635 97.9 °F (36.6 °C) 111 95 % 19 99/71 -- -- SS            Physical Exam    Results Reviewed       None            No orders to display       Procedures    ED Medication and Procedure Management   Prior to Admission Medications   Prescriptions Last Dose Informant Patient Reported? Taking?   albuterol (2.5 mg/3 mL) 0.083 % nebulizer solution   No No   Sig: Take 3 mL (2.5 mg total) by nebulization every 6 (six) hours as needed for wheezing or shortness of breath   albuterol (PROVENTIL HFA,VENTOLIN HFA) 90 mcg/act inhaler   No No   Sig: Inhale 2 puffs 4 (four) times a day   fluticasone (FLONASE) 50 mcg/act nasal spray   Yes No   Si spray into each nostril daily   fluticasone-umeclidinium-vilanterol (Trelegy Ellipta) 200-62.5-25 mcg/actuation AEPB inhaler   No No   Sig: Inhale 1 puff daily Rinse mouth after use.   hydrOXYzine HCL (ATARAX) 25 mg tablet   No No   Sig: Take 1 tablet (25 mg total) by mouth every 6 (six) hours as needed for anxiety   ipratropium-albuterol (DUO-NEB) 0.5-2.5 mg/3 mL nebulizer solution   No No   Sig: Take 3 mL by nebulization 4 (four) times a day   montelukast (SINGULAIR) 10 mg tablet   No No   Sig: Take 1 tablet (10 mg total) by mouth daily at bedtime      Facility-Administered Medications: None     Patient's Medications   Discharge Prescriptions    No medications on file     No discharge procedures on file.  ED SEPSIS DOCUMENTATION          shortness of breath and wheezing.    Cardiovascular:  Negative for chest pain and palpitations.   Gastrointestinal:  Negative for abdominal pain and vomiting.   Genitourinary:  Negative for frequency and hematuria.   Musculoskeletal:  Negative for arthralgias and back pain.   Skin:  Negative for color change and rash.   Neurological:  Negative for syncope.   All other systems reviewed and are negative.          Objective       ED Triage Vitals [04/20/25 1635]   Temperature Pulse Blood Pressure Respirations SpO2 Patient Position - Orthostatic VS   97.9 °F (36.6 °C) (!) 111 99/71 19 95 % --      Temp Source Heart Rate Source BP Location FiO2 (%) Pain Score    Oral Monitor -- -- --      Vitals      Date and Time Temp Pulse SpO2 Resp BP Pain Score FACES Pain Rating User   04/20/25 1635 97.9 °F (36.6 °C) 111 95 % 19 99/71 -- -- SS            Physical Exam  Vitals and nursing note reviewed.   Constitutional:       General: She is not in acute distress.     Appearance: She is well-developed.   HENT:      Head: Normocephalic and atraumatic.   Eyes:      Conjunctiva/sclera: Conjunctivae normal.   Cardiovascular:      Rate and Rhythm: Regular rhythm. Tachycardia present.      Heart sounds: No murmur heard.  Pulmonary:      Breath sounds: Wheezing present.      Comments: Tachypnea with mild conversational dyspnea and diffuse wheezes.  Abdominal:      Palpations: Abdomen is soft.      Tenderness: There is no abdominal tenderness.   Musculoskeletal:         General: No swelling.      Cervical back: Neck supple.   Skin:     General: Skin is warm and dry.      Capillary Refill: Capillary refill takes less than 2 seconds.   Neurological:      Mental Status: She is alert.   Psychiatric:         Mood and Affect: Mood normal.         Results Reviewed       None            No orders to display       Procedures    ED Medication and Procedure Management   Prior to Admission Medications   Prescriptions Last Dose Informant Patient  Reported? Taking?   albuterol (2.5 mg/3 mL) 0.083 % nebulizer solution   No No   Sig: Take 3 mL (2.5 mg total) by nebulization every 6 (six) hours as needed for wheezing or shortness of breath   albuterol (PROVENTIL HFA,VENTOLIN HFA) 90 mcg/act inhaler   No No   Sig: Inhale 2 puffs 4 (four) times a day   fluticasone (FLONASE) 50 mcg/act nasal spray   Yes No   Si spray into each nostril daily   fluticasone-umeclidinium-vilanterol (Trelegy Ellipta) 200-62.5-25 mcg/actuation AEPB inhaler   No No   Sig: Inhale 1 puff daily Rinse mouth after use.   hydrOXYzine HCL (ATARAX) 25 mg tablet   No No   Sig: Take 1 tablet (25 mg total) by mouth every 6 (six) hours as needed for anxiety   ipratropium-albuterol (DUO-NEB) 0.5-2.5 mg/3 mL nebulizer solution   No No   Sig: Take 3 mL by nebulization 4 (four) times a day   montelukast (SINGULAIR) 10 mg tablet   No No   Sig: Take 1 tablet (10 mg total) by mouth daily at bedtime      Facility-Administered Medications: None     Discharge Medication List as of 2025  6:31 PM        START taking these medications    Details   predniSONE 20 mg tablet Take 2 tablets (40 mg total) by mouth daily, Starting Sun 2025, Normal           CONTINUE these medications which have CHANGED    Details   montelukast (SINGULAIR) 10 mg tablet Take 1 tablet (10 mg total) by mouth daily at bedtime, Starting Sun 2025, Normal           CONTINUE these medications which have NOT CHANGED    Details   albuterol (2.5 mg/3 mL) 0.083 % nebulizer solution Take 3 mL (2.5 mg total) by nebulization every 6 (six) hours as needed for wheezing or shortness of breath, Starting Sun 3/2/2025, Normal      albuterol (PROVENTIL HFA,VENTOLIN HFA) 90 mcg/act inhaler Inhale 2 puffs 4 (four) times a day, Starting Sun 3/2/2025, Normal      fluticasone (FLONASE) 50 mcg/act nasal spray 1 spray into each nostril daily, Starting Sat 2022, Historical Med      fluticasone-umeclidinium-vilanterol (Trelegy Ellipta)  200-62.5-25 mcg/actuation AEPB inhaler Inhale 1 puff daily Rinse mouth after use., Starting Sun 3/2/2025, Until Sat 5/31/2025, Normal      hydrOXYzine HCL (ATARAX) 25 mg tablet Take 1 tablet (25 mg total) by mouth every 6 (six) hours as needed for anxiety, Starting Sun 3/2/2025, Normal      ipratropium-albuterol (DUO-NEB) 0.5-2.5 mg/3 mL nebulizer solution Take 3 mL by nebulization 4 (four) times a day, Starting Sun 3/2/2025, Normal           No discharge procedures on file.  ED SEPSIS DOCUMENTATION   Time reflects when diagnosis was documented in both MDM as applicable and the Disposition within this note       Time User Action Codes Description Comment    4/20/2025  5:57 PM Marc Canseco Add [J45.901] Asthma exacerbation                  Marc Canseco MD  05/09/25 1039

## 2025-04-21 ENCOUNTER — APPOINTMENT (EMERGENCY)
Dept: RADIOLOGY | Facility: HOSPITAL | Age: 20
DRG: 137 | End: 2025-04-21
Payer: COMMERCIAL

## 2025-04-21 ENCOUNTER — HOSPITAL ENCOUNTER (INPATIENT)
Facility: HOSPITAL | Age: 20
LOS: 1 days | Discharge: HOME | DRG: 137 | End: 2025-04-23
Attending: EMERGENCY MEDICINE | Admitting: INTERNAL MEDICINE
Payer: COMMERCIAL

## 2025-04-21 DIAGNOSIS — U07.1 COVID-19: ICD-10-CM

## 2025-04-21 DIAGNOSIS — J10.1 INFLUENZA B: ICD-10-CM

## 2025-04-21 DIAGNOSIS — J45.901 ACUTE ASTHMA EXACERBATION: Primary | ICD-10-CM

## 2025-04-21 PROBLEM — Z59.71 INSURANCE COVERAGE PROBLEMS: Status: ACTIVE | Noted: 2025-04-21

## 2025-04-21 PROBLEM — J11.1 INFLUENZA: Status: ACTIVE | Noted: 2025-04-21

## 2025-04-21 LAB
ANION GAP SERPL CALCULATED.3IONS-SCNC: 7 MMOL/L (ref 4–13)
ATRIAL RATE: 91 BPM
BASOPHILS # BLD AUTO: 0.07 THOUSANDS/ÂΜL (ref 0–0.1)
BASOPHILS NFR BLD AUTO: 1 % (ref 0–1)
BUN SERPL-MCNC: 11 MG/DL (ref 5–25)
CALCIUM SERPL-MCNC: 8.8 MG/DL (ref 8.4–10.2)
CHLORIDE SERPL-SCNC: 109 MMOL/L (ref 96–108)
CO2 SERPL-SCNC: 21 MMOL/L (ref 21–32)
CREAT SERPL-MCNC: 0.71 MG/DL (ref 0.6–1.3)
EOSINOPHIL # BLD AUTO: 0.78 THOUSAND/ÂΜL (ref 0–0.61)
EOSINOPHIL NFR BLD AUTO: 5 % (ref 0–6)
ERYTHROCYTE [DISTWIDTH] IN BLOOD BY AUTOMATED COUNT: 15 % (ref 11.6–15.1)
FLUAV AG UPPER RESP QL IA.RAPID: NEGATIVE
FLUBV AG UPPER RESP QL IA.RAPID: POSITIVE
GFR SERPL CREATININE-BSD FRML MDRD: 122 ML/MIN/1.73SQ M
GLUCOSE SERPL-MCNC: 89 MG/DL (ref 65–140)
HCG SERPL QL: NEGATIVE
HCT VFR BLD AUTO: 36 % (ref 34.8–46.1)
HGB BLD-MCNC: 11.5 G/DL (ref 11.5–15.4)
IMM GRANULOCYTES # BLD AUTO: 0.05 THOUSAND/UL (ref 0–0.2)
IMM GRANULOCYTES NFR BLD AUTO: 0 % (ref 0–2)
LYMPHOCYTES # BLD AUTO: 5.55 THOUSANDS/ÂΜL (ref 0.6–4.47)
LYMPHOCYTES NFR BLD AUTO: 39 % (ref 14–44)
MCH RBC QN AUTO: 27.1 PG (ref 26.8–34.3)
MCHC RBC AUTO-ENTMCNC: 31.9 G/DL (ref 31.4–37.4)
MCV RBC AUTO: 85 FL (ref 82–98)
MONOCYTES # BLD AUTO: 0.92 THOUSAND/ÂΜL (ref 0.17–1.22)
MONOCYTES NFR BLD AUTO: 6 % (ref 4–12)
NEUTROPHILS # BLD AUTO: 7.04 THOUSANDS/ÂΜL (ref 1.85–7.62)
NEUTS SEG NFR BLD AUTO: 49 % (ref 43–75)
NRBC BLD AUTO-RTO: 0 /100 WBCS
P AXIS: 71 DEGREES
PLATELET # BLD AUTO: 356 THOUSANDS/UL (ref 149–390)
PMV BLD AUTO: 10.4 FL (ref 8.9–12.7)
POTASSIUM SERPL-SCNC: 3.7 MMOL/L (ref 3.5–5.3)
PR INTERVAL: 108 MS
QRS AXIS: 75 DEGREES
QRSD INTERVAL: 74 MS
QT INTERVAL: 316 MS
QTC INTERVAL: 388 MS
RBC # BLD AUTO: 4.24 MILLION/UL (ref 3.81–5.12)
SARS-COV+SARS-COV-2 AG RESP QL IA.RAPID: POSITIVE
SODIUM SERPL-SCNC: 137 MMOL/L (ref 135–147)
T WAVE AXIS: 56 DEGREES
VENTRICULAR RATE: 91 BPM
WBC # BLD AUTO: 14.41 THOUSAND/UL (ref 4.31–10.16)

## 2025-04-21 PROCEDURE — 93005 ELECTROCARDIOGRAM TRACING: CPT

## 2025-04-21 PROCEDURE — 94760 N-INVAS EAR/PLS OXIMETRY 1: CPT

## 2025-04-21 PROCEDURE — 93010 ELECTROCARDIOGRAM REPORT: CPT

## 2025-04-21 PROCEDURE — 94640 AIRWAY INHALATION TREATMENT: CPT

## 2025-04-21 PROCEDURE — XW033E5 INTRODUCTION OF REMDESIVIR ANTI-INFECTIVE INTO PERIPHERAL VEIN, PERCUTANEOUS APPROACH, NEW TECHNOLOGY GROUP 5: ICD-10-PCS | Performed by: INTERNAL MEDICINE

## 2025-04-21 PROCEDURE — 94664 DEMO&/EVAL PT USE INHALER: CPT

## 2025-04-21 PROCEDURE — 99223 1ST HOSP IP/OBS HIGH 75: CPT | Performed by: INTERNAL MEDICINE

## 2025-04-21 PROCEDURE — 99285 EMERGENCY DEPT VISIT HI MDM: CPT

## 2025-04-21 PROCEDURE — 36415 COLL VENOUS BLD VENIPUNCTURE: CPT

## 2025-04-21 PROCEDURE — 84703 CHORIONIC GONADOTROPIN ASSAY: CPT

## 2025-04-21 PROCEDURE — 96375 TX/PRO/DX INJ NEW DRUG ADDON: CPT

## 2025-04-21 PROCEDURE — 87811 SARS-COV-2 COVID19 W/OPTIC: CPT

## 2025-04-21 PROCEDURE — 80048 BASIC METABOLIC PNL TOTAL CA: CPT

## 2025-04-21 PROCEDURE — 71045 X-RAY EXAM CHEST 1 VIEW: CPT

## 2025-04-21 PROCEDURE — 85025 COMPLETE CBC W/AUTO DIFF WBC: CPT

## 2025-04-21 PROCEDURE — 96365 THER/PROPH/DIAG IV INF INIT: CPT

## 2025-04-21 PROCEDURE — 87804 INFLUENZA ASSAY W/OPTIC: CPT

## 2025-04-21 RX ORDER — MAGNESIUM SULFATE HEPTAHYDRATE 40 MG/ML
2 INJECTION, SOLUTION INTRAVENOUS ONCE
Status: COMPLETED | OUTPATIENT
Start: 2025-04-21 | End: 2025-04-21

## 2025-04-21 RX ORDER — SODIUM CHLORIDE FOR INHALATION 0.9 %
12 VIAL, NEBULIZER (ML) INHALATION ONCE
Status: COMPLETED | OUTPATIENT
Start: 2025-04-21 | End: 2025-04-21

## 2025-04-21 RX ORDER — ACETAMINOPHEN 325 MG/1
650 TABLET ORAL EVERY 6 HOURS PRN
Status: DISCONTINUED | OUTPATIENT
Start: 2025-04-21 | End: 2025-04-23 | Stop reason: HOSPADM

## 2025-04-21 RX ORDER — HYDROXYZINE HYDROCHLORIDE 25 MG/1
25 TABLET, FILM COATED ORAL EVERY 6 HOURS PRN
Status: DISCONTINUED | OUTPATIENT
Start: 2025-04-21 | End: 2025-04-23 | Stop reason: HOSPADM

## 2025-04-21 RX ORDER — ONDANSETRON 2 MG/ML
4 INJECTION INTRAMUSCULAR; INTRAVENOUS EVERY 6 HOURS PRN
Status: DISCONTINUED | OUTPATIENT
Start: 2025-04-21 | End: 2025-04-23 | Stop reason: HOSPADM

## 2025-04-21 RX ORDER — ALBUTEROL SULFATE 0.83 MG/ML
2.5 SOLUTION RESPIRATORY (INHALATION) EVERY 6 HOURS PRN
Status: DISCONTINUED | OUTPATIENT
Start: 2025-04-21 | End: 2025-04-23 | Stop reason: HOSPADM

## 2025-04-21 RX ORDER — IPRATROPIUM BROMIDE AND ALBUTEROL SULFATE 2.5; .5 MG/3ML; MG/3ML
3 SOLUTION RESPIRATORY (INHALATION)
Status: DISCONTINUED | OUTPATIENT
Start: 2025-04-21 | End: 2025-04-23 | Stop reason: HOSPADM

## 2025-04-21 RX ORDER — METHYLPREDNISOLONE SODIUM SUCCINATE 40 MG/ML
40 INJECTION, POWDER, LYOPHILIZED, FOR SOLUTION INTRAMUSCULAR; INTRAVENOUS DAILY
Status: DISCONTINUED | OUTPATIENT
Start: 2025-04-22 | End: 2025-04-23 | Stop reason: HOSPADM

## 2025-04-21 RX ORDER — SODIUM CHLORIDE FOR INHALATION 0.9 %
12 VIAL, NEBULIZER (ML) INHALATION ONCE
Status: DISCONTINUED | OUTPATIENT
Start: 2025-04-21 | End: 2025-04-21

## 2025-04-21 RX ORDER — ALBUTEROL SULFATE 5 MG/ML
10 SOLUTION RESPIRATORY (INHALATION) ONCE
Status: DISCONTINUED | OUTPATIENT
Start: 2025-04-21 | End: 2025-04-21

## 2025-04-21 RX ORDER — ALBUTEROL SULFATE 5 MG/ML
10 SOLUTION RESPIRATORY (INHALATION) ONCE
Status: COMPLETED | OUTPATIENT
Start: 2025-04-21 | End: 2025-04-21

## 2025-04-21 RX ORDER — MAGNESIUM HYDROXIDE/ALUMINUM HYDROXICE/SIMETHICONE 120; 1200; 1200 MG/30ML; MG/30ML; MG/30ML
30 SUSPENSION ORAL EVERY 6 HOURS PRN
Status: DISCONTINUED | OUTPATIENT
Start: 2025-04-21 | End: 2025-04-23 | Stop reason: HOSPADM

## 2025-04-21 RX ORDER — OSELTAMIVIR PHOSPHATE 75 MG/1
75 CAPSULE ORAL EVERY 12 HOURS SCHEDULED
Status: DISCONTINUED | OUTPATIENT
Start: 2025-04-21 | End: 2025-04-23 | Stop reason: HOSPADM

## 2025-04-21 RX ORDER — ALBUTEROL SULFATE 0.83 MG/ML
5 SOLUTION RESPIRATORY (INHALATION) ONCE
Status: COMPLETED | OUTPATIENT
Start: 2025-04-21 | End: 2025-04-21

## 2025-04-21 RX ORDER — MONTELUKAST SODIUM 10 MG/1
10 TABLET ORAL
Status: DISCONTINUED | OUTPATIENT
Start: 2025-04-21 | End: 2025-04-23 | Stop reason: HOSPADM

## 2025-04-21 RX ORDER — METHYLPREDNISOLONE SODIUM SUCCINATE 125 MG/2ML
125 INJECTION, POWDER, LYOPHILIZED, FOR SOLUTION INTRAMUSCULAR; INTRAVENOUS ONCE
Status: COMPLETED | OUTPATIENT
Start: 2025-04-21 | End: 2025-04-21

## 2025-04-21 RX ORDER — GUAIFENESIN/DEXTROMETHORPHAN 100-10MG/5
10 SYRUP ORAL 3 TIMES DAILY
Status: DISCONTINUED | OUTPATIENT
Start: 2025-04-21 | End: 2025-04-23 | Stop reason: HOSPADM

## 2025-04-21 RX ADMIN — GUAIFENESIN AND DEXTROMETHORPHAN 10 ML: 100; 10 SYRUP ORAL at 19:03

## 2025-04-21 RX ADMIN — IPRATROPIUM BROMIDE 1 MG: 0.5 SOLUTION RESPIRATORY (INHALATION) at 15:49

## 2025-04-21 RX ADMIN — METHYLPREDNISOLONE SODIUM SUCCINATE 125 MG: 125 INJECTION, POWDER, FOR SOLUTION INTRAMUSCULAR; INTRAVENOUS at 15:52

## 2025-04-21 RX ADMIN — ISODIUM CHLORIDE 12 ML: 0.03 SOLUTION RESPIRATORY (INHALATION) at 15:49

## 2025-04-21 RX ADMIN — ALBUTEROL SULFATE 5 MG: 2.5 SOLUTION RESPIRATORY (INHALATION) at 17:59

## 2025-04-21 RX ADMIN — OSELTAMIVIR 75 MG: 75 CAPSULE ORAL at 19:03

## 2025-04-21 RX ADMIN — IPRATROPIUM BROMIDE 0.5 MG: 0.5 SOLUTION RESPIRATORY (INHALATION) at 17:59

## 2025-04-21 RX ADMIN — MONTELUKAST 10 MG: 10 TABLET, FILM COATED ORAL at 22:20

## 2025-04-21 RX ADMIN — MAGNESIUM SULFATE HEPTAHYDRATE 2 G: 40 INJECTION, SOLUTION INTRAVENOUS at 15:53

## 2025-04-21 RX ADMIN — REMDESIVIR 200 MG: 100 INJECTION, POWDER, LYOPHILIZED, FOR SOLUTION INTRAVENOUS at 19:04

## 2025-04-21 RX ADMIN — ALBUTEROL SULFATE 10 MG: 2.5 SOLUTION RESPIRATORY (INHALATION) at 15:48

## 2025-04-21 RX ADMIN — IPRATROPIUM BROMIDE AND ALBUTEROL SULFATE 3 ML: .5; 3 SOLUTION RESPIRATORY (INHALATION) at 20:45

## 2025-04-21 NOTE — ASSESSMENT & PLAN NOTE
Patient presents with acute asthma exacerbation in setting of influenza, COVID  Appears improved after intervention in the emergency department  Will continue scheduled nebulizers  Start IV steroids 40 of Solu-Medrol daily  Treatment for COVID and influenza

## 2025-04-21 NOTE — H&P
H&P - Hospitalist   Name: Lakhwinder Puckett 19 y.o. female I MRN: [id number]  Unit/Bed#: ED-02 I Date of Admission: 4/21/2025   Date of Service: 4/21/2025 I Hospital Day: 0     Assessment & Plan  Anxiety  Atarax as needed  Morbid obesity due to excess calories ([facility])  Recommend dietary and excise modification  Severe persistent asthma with acute exacerbation  Patient presents with acute asthma exacerbation in setting of influenza, COVID  Appears improved after intervention in the emergency department  Will continue scheduled nebulizers  Start IV steroids 40 of Solu-Medrol daily  Treatment for COVID and influenza  Eosinophilic asthma  Continue Singulair  When patient obtains insurance consider Dupixent  COVID-19  Patient is not on oxygen but has high risk features  Start remdesivir  IV Solu-Medrol in place of Decadron  Influenza  Start Tamiflu  Insurance coverage problems  Unable to obtain chronic medications as she is without insurance  Case management consultation      VTE Pharmacologic Prophylaxis:   Low Risk (Score 0-2) - Encourage Ambulation.  Code Status: Prior   Discussion with family:   .     Anticipated Length of Stay: Patient will be admitted on an observation basis with an anticipated length of stay of less than 2 midnights secondary to management of acute active exacerbation with high risk features.    History of Present Illness   Chief Complaint: Shortness of breath    Lakhwinder Puckett is a 19 y.o. female With past medical history of severe persistent asthma, anxiety, obesity presents to the hospital with shortness of breath.  She presented to the emergency department yesterday treated symptomatically and discharged home.  She presents with progressive symptoms tachypneic with accessory muscle usage.  Patient received heart neb, Solu-Medrol and magnesium.  She was found to be positive for COVID and influenza B.  Patient was requested for admission.  Patient reports sick contacts.  Her grandfather is ill but had a  negative COVID test.  He has been quarantined.  She denies any fevers or chills.  Does have a productive cough of clear sputum.    Review of Systems   Constitutional:  Negative for chills and fever.   HENT:  Negative for sore throat and trouble swallowing.    Eyes:  Negative for photophobia and visual disturbance.   Respiratory:  Positive for shortness of breath and wheezing.    Cardiovascular:  Negative for chest pain and palpitations.   Gastrointestinal:  Negative for constipation, diarrhea, nausea and vomiting.   Genitourinary:  Negative for difficulty urinating and dysuria.   Musculoskeletal:  Negative for arthralgias and myalgias.   Skin:  Negative for rash and wound.   Neurological:  Negative for dizziness, light-headedness and headaches.       Historical Information   Past Medical History:   Diagnosis Date    Asthma     Leukocytosis 6/21/2022    SIRS (systemic inflammatory response syndrome) (HCC) 12/16/2023     No past surgical history on file.  Social History     Tobacco Use    Smoking status: Never    Smokeless tobacco: Never   Vaping Use    Vaping status: Never Used   Substance and Sexual Activity    Alcohol use: Not Currently    Drug use: Not Currently    Sexual activity: Not on file     E-Cigarette/Vaping    E-Cigarette Use Never User      E-Cigarette/Vaping Substances    Nicotine No     THC No     CBD No     Flavoring No     Other No     Unknown No        Social History:  Marital Status: Single   Occupation:   Patient Pre-hospital Living Situation:   Patient Pre-hospital Level of Mobility:   Patient Pre-hospital Diet Restrictions:     Meds/Allergies   I have reviewed home medications with patient personally.  Prior to Admission medications    Medication Sig Start Date End Date Taking? Authorizing Provider   albuterol (2.5 mg/3 mL) 0.083 % nebulizer solution Take 3 mL (2.5 mg total) by nebulization every 6 (six) hours as needed for wheezing or shortness of breath 3/2/25   Laureano Philip, DO    albuterol (PROVENTIL HFA,VENTOLIN HFA) 90 mcg/act inhaler Inhale 2 puffs 4 (four) times a day 3/2/25   Laureano Philip DO   fluticasone (FLONASE) 50 mcg/act nasal spray 1 spray into each nostril daily 6/4/22   Historical Provider, MD   fluticasone-umeclidinium-vilanterol (Trelegy Ellipta) 200-62.5-25 mcg/actuation AEPB inhaler Inhale 1 puff daily Rinse mouth after use. 3/2/25 5/31/25  Laureano Philip DO   hydrOXYzine HCL (ATARAX) 25 mg tablet Take 1 tablet (25 mg total) by mouth every 6 (six) hours as needed for anxiety 3/2/25   Laureano Philip DO   ipratropium-albuterol (DUO-NEB) 0.5-2.5 mg/3 mL nebulizer solution Take 3 mL by nebulization 4 (four) times a day 3/2/25   Laureano Philip DO   montelukast (SINGULAIR) 10 mg tablet Take 1 tablet (10 mg total) by mouth daily at bedtime 3/2/25 4/1/25  Laureano Philip DO   montelukast (SINGULAIR) 10 mg tablet Take 1 tablet (10 mg total) by mouth daily at bedtime 4/20/25   Marc Canseco MD   predniSONE 20 mg tablet Take 2 tablets (40 mg total) by mouth daily 4/20/25   Marc Canseco MD     No Known Allergies    Objective :  Temp:  [98.6 °F (37 °C)] 98.6 °F (37 °C)  HR:  [] 102  BP: (118-131)/(58-73) 118/58  Resp:  [18-24] 20  SpO2:  [95 %-99 %] 97 %  O2 Device: None (Room air)    Physical Exam  Vitals reviewed.   Constitutional:       General: She is not in acute distress.     Appearance: She is well-developed. She is not ill-appearing, toxic-appearing or diaphoretic.   HENT:      Head: Normocephalic and atraumatic.      Mouth/Throat:      Mouth: Mucous membranes are moist.   Eyes:      General: No scleral icterus.     Extraocular Movements: Extraocular movements intact.   Cardiovascular:      Rate and Rhythm: Normal rate and regular rhythm.      Heart sounds: Normal heart sounds.   Pulmonary:      Effort: Pulmonary effort is normal. No respiratory distress.      Breath sounds: No wheezing or rales.      Comments: Prolonged expiratory phase, faint  scattered wheeze  Abdominal:      General: There is no distension.      Palpations: Abdomen is soft.      Tenderness: There is no abdominal tenderness. There is no guarding or rebound.   Musculoskeletal:         General: No swelling, tenderness or deformity.   Skin:     General: Skin is warm and dry.   Neurological:      General: No focal deficit present.      Mental Status: She is alert. Mental status is at baseline.   Psychiatric:         Mood and Affect: Mood normal.         Behavior: Behavior normal.         Thought Content: Thought content normal.         Judgment: Judgment normal.          Lines/Drains:            Lab Results: I have reviewed the following results:  Results from last 7 days   Lab Units 04/21/25  1551   WBC Thousand/uL 14.41*   HEMOGLOBIN g/dL 11.5   HEMATOCRIT % 36.0   PLATELETS Thousands/uL 356   SEGS PCT % 49   LYMPHO PCT % 39   MONO PCT % 6   EOS PCT % 5     Results from last 7 days   Lab Units 04/21/25  1551   SODIUM mmol/L 137   POTASSIUM mmol/L 3.7   CHLORIDE mmol/L 109*   CO2 mmol/L 21   BUN mg/dL 11   CREATININE mg/dL 0.71   ANION GAP mmol/L 7   CALCIUM mg/dL 8.8   GLUCOSE RANDOM mg/dL 89             Lab Results   Component Value Date    HGBA1C 5.5 08/06/2021                 Administrative Statements       ** Please Note: This note has been constructed using a voice recognition system. **

## 2025-04-21 NOTE — ED PROVIDER NOTES
Time reflects when diagnosis was documented in both MDM as applicable and the Disposition within this note       Time User Action Codes Description Comment    4/21/2025  6:03 PM Eder Escobar [J45.901] Acute asthma exacerbation     4/21/2025  6:04 PM Eder Escobar [U07.1] COVID-19     4/21/2025  6:04 PM Eder Escobar [J10.1] Influenza B           ED Disposition       ED Disposition   Admit    Condition   Stable    Date/Time   Mon Apr 21, 2025  6:17 PM    Comment   Case was discussed with Dr Camacho and the patient's admission status was agreed to be Admission Status: observation status to the service of Dr. Camacho.               Assessment & Plan       Medical Decision Making  19-year-old female presents to the ED with asthma exacerbation.  Per chart review, patient seen in the same ED yesterday, discharged on prednisone, states symptoms worsened significantly several hours ago.  Has been admitted for asthma before.  Patient initially tachypneic with accessory muscle usage and retractions, lungs decreased in all fields.  Given gutierrez neb, IV Solu-Medrol, IV magnesium.  Breathing was improved but wheezing still present, second DuoNeb initiated.  Labs were significant for positive COVID as well as positive influenza B, labs appear otherwise acutely unremarkable.  Discussed case with Jani who was agreeable to obs admission.      Critical Care Time Statement: Upon my evaluation, this patient had a high probability of imminent or life-threatening deterioration due to Respiratory distress due to asthma, which required my direct attention, intervention, and personal management.  I spent a total of 35 minutes directly providing critical care services, including interpretation of complex medical databases, evaluating for the presence of life-threatening injuries or illnesses, complex medical decision making (to support/prevent further life-threatening deterioration)., interpretation of hemodynamic data, and  titration of vasoactive medications. This time is exclusive of procedures, teaching, treating other patients, family meetings, and any prior time recorded by providers other than myself.       Amount and/or Complexity of Data Reviewed  Labs: ordered. Decision-making details documented in ED Course.  Radiology: ordered and independent interpretation performed.    Risk  Prescription drug management.  Decision regarding hospitalization.        ED Course as of 04/21/25 2035 Mon Apr 21, 2025   1722 SARS COV Rapid Antigen(!): Positive   1723 Influenza B Rapid Antigen(!): Positive   1804 Patient's breathing is improved from prior.  Tachypnea has improved, she is able to complete sentences at this time.  Wheezing still present in bilateral bases.  Will reorder DuoNeb, will reach out to Slim.       Medications   albuterol inhalation solution 2.5 mg (has no administration in time range)   hydrOXYzine HCL (ATARAX) tablet 25 mg (has no administration in time range)   ipratropium-albuterol (DUO-NEB) 0.5-2.5 mg/3 mL inhalation solution 3 mL (has no administration in time range)   montelukast (SINGULAIR) tablet 10 mg (has no administration in time range)   acetaminophen (TYLENOL) tablet 650 mg (has no administration in time range)   ondansetron (ZOFRAN) injection 4 mg (has no administration in time range)   aluminum-magnesium hydroxide-simethicone (MAALOX) oral suspension 30 mL (has no administration in time range)   dextromethorphan-guaiFENesin (ROBITUSSIN DM) oral syrup 10 mL (10 mL Oral Given 4/21/25 1903)   methylPREDNISolone sodium succinate (Solu-MEDROL) injection 40 mg (has no administration in time range)   remdesivir (Veklury) 200 mg in sodium chloride 0.9 % 290 mL IVPB (200 mg Intravenous New Bag 4/21/25 1904)     Followed by   remdesivir (Veklury) 100 mg in sodium chloride 0.9 % 270 mL IVPB (has no administration in time range)   oseltamivir (TAMIFLU) capsule 75 mg (75 mg Oral Given 4/21/25 1903)   albuterol  "inhalation solution 10 mg (10 mg Nebulization Given 4/21/25 1548)   ipratropium (ATROVENT) 0.02 % inhalation solution 1 mg (1 mg Nebulization Given 4/21/25 1549)   sodium chloride 0.9 % inhalation solution 12 mL (12 mL Nebulization Given 4/21/25 1549)   methylPREDNISolone sodium succinate (Solu-MEDROL) injection 125 mg (125 mg Intravenous Given 4/21/25 1552)   magnesium sulfate 2 g/50 mL IVPB (premix) 2 g (0 g Intravenous Stopped 4/21/25 1623)   albuterol inhalation solution 5 mg (5 mg Nebulization Given 4/21/25 1759)   ipratropium (ATROVENT) 0.02 % inhalation solution 0.5 mg (0.5 mg Nebulization Given 4/21/25 1759)       ED Risk Strat Scores              CRAFFT      Flowsheet Row Most Recent Value   CRAFFT Initial Screen: During the past 12 months, did you:    1. Drink any alcohol (more than a few sips)?  No Filed at: 04/21/2025 1978   2. Smoke any marijuana or hashish No Filed at: 04/21/2025 7129   3. Use anything else to get high? (\"anything else\" includes illegal drugs, over the counter and prescription drugs, and things that you sniff or 'patiño')? No Filed at: 04/21/2025 9765              No data recorded                            History of Present Illness       Chief Complaint   Patient presents with    Asthma     Patient c/o asthma exacerbation, patient states sob, patient states seen here yesterday for same, wheezing in triage,        Past Medical History:   Diagnosis Date    Asthma     Leukocytosis 6/21/2022    SIRS (systemic inflammatory response syndrome) (HCC) 12/16/2023      No past surgical history on file.   Family History   Problem Relation Age of Onset    Thyroid disease Mother     No Known Problems Father       Social History     Tobacco Use    Smoking status: Never    Smokeless tobacco: Never   Vaping Use    Vaping status: Never Used   Substance Use Topics    Alcohol use: Not Currently    Drug use: Not Currently      E-Cigarette/Vaping    E-Cigarette Use Never User       E-Cigarette/Vaping " Substances    Nicotine No     THC No     CBD No     Flavoring No     Other No     Unknown No       I have reviewed and agree with the history as documented.     This is a 19-year-old female with medical history significant for asthma who presents to the ED with SOB.  Per chart review, patient was seen in the same ED yesterday, given DuoNeb as well as prednisone for asthma exacerbation and discharged.  Reports that initially she felt better after discharge but states in the last several hours she worsened significantly.  Does states she has had a nonproductive cough for the last few days, also reports runny nose that does report seasonal allergies.  Reports that she took home medications with no improvement in her symptoms.  She denies any fevers, chills, headaches, vision changes, chest pain, abdominal pain, NVD.      Asthma  Associated symptoms: cough, rhinorrhea and shortness of breath    Associated symptoms: no abdominal pain, no chest pain, no diarrhea, no fever, no headaches, no nausea, no sore throat and no vomiting        Review of Systems   Constitutional:  Negative for chills and fever.   HENT:  Positive for rhinorrhea. Negative for sore throat.    Eyes:  Negative for photophobia and visual disturbance.   Respiratory:  Positive for cough and shortness of breath.    Cardiovascular:  Negative for chest pain and palpitations.   Gastrointestinal:  Negative for abdominal pain, diarrhea, nausea and vomiting.   Genitourinary:  Negative for difficulty urinating, dysuria and flank pain.   Musculoskeletal:  Negative for neck pain and neck stiffness.   Neurological:  Negative for dizziness, syncope, light-headedness and headaches.           Objective       ED Triage Vitals   Temperature Pulse Blood Pressure Respirations SpO2 Patient Position - Orthostatic VS   04/21/25 1544 04/21/25 1531 04/21/25 1531 04/21/25 1531 04/21/25 1531 04/21/25 1531   98.6 °F (37 °C) (!) 122 131/61 (!) 24 96 % Sitting      Temp Source Heart  Rate Source BP Location FiO2 (%) Pain Score    04/21/25 1544 04/21/25 1531 04/21/25 1531 -- 04/21/25 2033    Oral Monitor Right arm  No Pain      Vitals      Date and Time Temp Pulse SpO2 Resp BP Pain Score FACES Pain Rating User   04/21/25 2033 -- -- -- -- -- No Pain -- RY   04/21/25 2025 -- -- -- 19 -- -- -- SN   04/21/25 2025 98.5 °F (36.9 °C) 106 97 % -- 131/74 -- -- DII   04/21/25 1830 -- 115 97 % 20 131/61 -- -- SL   04/21/25 1800 -- 101 96 % 20 130/61 -- --    04/21/25 1730 -- 102 97 % 20 118/58 -- --    04/21/25 1700 -- 109 95 % 18 129/58 -- --    04/21/25 1630 -- 98 99 % 22 124/73 -- --    04/21/25 1545 -- 97 99 % 24 122/66 -- --    04/21/25 1544 98.6 °F (37 °C) -- -- -- -- -- --    04/21/25 1531 -- 122 96 % 24 131/61 -- -- BLG            Physical Exam  Vitals and nursing note reviewed.   Constitutional:       General: She is not in acute distress.     Appearance: She is well-developed. She is not diaphoretic.   HENT:      Head: Normocephalic and atraumatic.   Eyes:      Conjunctiva/sclera: Conjunctivae normal.   Cardiovascular:      Rate and Rhythm: Normal rate and regular rhythm.      Heart sounds: No murmur heard.  Pulmonary:      Effort: Tachypnea, accessory muscle usage and retractions present.      Breath sounds: Decreased breath sounds present.      Comments: Patient tachypneic with accessory muscle usage and retractions noted.  Lung sounds are decreased in all fields.  She is only able to speak 1-2 words between breaths on initial assessment.  Abdominal:      Palpations: Abdomen is soft.      Tenderness: There is no abdominal tenderness.   Musculoskeletal:         General: No swelling.      Cervical back: Normal range of motion and neck supple. No rigidity.   Skin:     General: Skin is warm and dry.      Capillary Refill: Capillary refill takes less than 2 seconds.   Neurological:      General: No focal deficit present.      Mental Status: She is alert and oriented to person, place, and  time.      GCS: GCS eye subscore is 4. GCS verbal subscore is 5. GCS motor subscore is 6.   Psychiatric:         Mood and Affect: Mood normal.         Results Reviewed       Procedure Component Value Units Date/Time    FLU/COVID Rapid Antigen (30 min. TAT) - Preferred screening test in ED [[id number]]  (Abnormal) Collected: 04/21/25 1551    Lab Status: Final result Specimen: Nares from Nose Updated: 04/21/25 1629     SARS COV Rapid Antigen Positive     Influenza A Rapid Antigen Negative     Influenza B Rapid Antigen Positive    Narrative:      This test has been performed using the Extenda-Dent Heidi 2 FLU+SARS Antigen test under the Emergency Use Authorization (EUA). This test has been validated by the  and verified by the performing laboratory. The Heidi uses lateral flow immunofluorescent sandwich assay to detect SARS-COV, Influenza A and Influenza B Antigen.     The Quidel Heidi 2 SARS Antigen test does not differentiate between SARS-CoV and SARS-CoV-2.     Negative results are presumptive and may be confirmed with a molecular assay, if necessary, for patient management. Negative results do not rule out SARS-CoV-2 or influenza infection and should not be used as the sole basis for treatment or patient management decisions. A negative test result may occur if the level of antigen in a sample is below the limit of detection of this test.     Positive results are indicative of the presence of viral antigens, but do not rule out bacterial infection or co-infection with other viruses.     All test results should be used as an adjunct to clinical observations and other information available to the provider.    FOR PEDIATRIC PATIENTS - copy/paste COVID Guidelines URL to browser: [email or web address]    hCG, qualitative pregnancy [[id number]]  (Normal) Collected: 04/21/25 1551    Lab Status: Final result Specimen: Blood from Hand, Right Updated: 04/21/25 4746      Preg, Serum Negative    Basic metabolic panel [[id number]]  (Abnormal) Collected: 04/21/25 1551    Lab Status: Final result Specimen: Blood from Hand, Right Updated: 04/21/25 1614     Sodium 137 mmol/L      Potassium 3.7 mmol/L      Chloride 109 mmol/L      CO2 21 mmol/L      ANION GAP 7 mmol/L      BUN 11 mg/dL      Creatinine 0.71 mg/dL      Glucose 89 mg/dL      Calcium 8.8 mg/dL      eGFR 122 ml/min/1.73sq m     Narrative:      National Kidney Disease Foundation guidelines for Chronic Kidney Disease (CKD):     Stage 1 with normal or high GFR (GFR > 90 mL/min/1.73 square meters)    Stage 2 Mild CKD (GFR = 60-89 mL/min/1.73 square meters)    Stage 3A Moderate CKD (GFR = 45-59 mL/min/1.73 square meters)    Stage 3B Moderate CKD (GFR = 30-44 mL/min/1.73 square meters)    Stage 4 Severe CKD (GFR = 15-29 mL/min/1.73 square meters)    Stage 5 End Stage CKD (GFR <15 mL/min/1.73 square meters)  Note: GFR calculation is accurate only with a steady state creatinine    CBC and differential [[id number]]  (Abnormal) Collected: 04/21/25 1551    Lab Status: Final result Specimen: Blood from Hand, Right Updated: 04/21/25 1601     WBC 14.41 Thousand/uL      RBC 4.24 Million/uL      Hemoglobin 11.5 g/dL      Hematocrit 36.0 %      MCV 85 fL      MCH 27.1 pg      MCHC 31.9 g/dL      RDW 15.0 %      MPV 10.4 fL      Platelets 356 Thousands/uL      nRBC 0 /100 WBCs      Segmented % 49 %      Immature Grans % 0 %      Lymphocytes % 39 %      Monocytes % 6 %      Eosinophils Relative 5 %      Basophils Relative 1 %      Absolute Neutrophils 7.04 Thousands/µL      Absolute Immature Grans 0.05 Thousand/uL      Absolute Lymphocytes 5.55 Thousands/µL      Absolute Monocytes 0.92 Thousand/µL      Eosinophils Absolute 0.78 Thousand/µL      Basophils Absolute 0.07 Thousands/µL             XR chest 1 view portable   ED Interpretation by Eder Escobar PA-C (04/21 1712)   ED Interpretation: No acute cardiopulmonary disease.          ECG 12 Lead  Documentation Only    Date/Time: 2025 6:48 PM    Performed by: Eder Escobar PA-C  Authorized by: Eder Escobar PA-C    Rate:     ECG rate:  91    ECG rate assessment: normal    Rhythm:     Rhythm: sinus rhythm    Ectopy:     Ectopy: none    QRS:     QRS axis:  Normal    QRS intervals:  Normal  Conduction:     Conduction: normal    ST segments:     ST segments:  Normal      ED Medication and Procedure Management   Prior to Admission Medications   Prescriptions Last Dose Informant Patient Reported? Taking?   albuterol (2.5 mg/3 mL) 0.083 % nebulizer solution   No No   Sig: Take 3 mL (2.5 mg total) by nebulization every 6 (six) hours as needed for wheezing or shortness of breath   albuterol (PROVENTIL HFA,VENTOLIN HFA) 90 mcg/act inhaler   No No   Sig: Inhale 2 puffs 4 (four) times a day   fluticasone (FLONASE) 50 mcg/act nasal spray   Yes No   Si spray into each nostril daily   fluticasone-umeclidinium-vilanterol (Trelegy Ellipta) 200-62.5-25 mcg/actuation AEPB inhaler   No No   Sig: Inhale 1 puff daily Rinse mouth after use.   hydrOXYzine HCL (ATARAX) 25 mg tablet   No No   Sig: Take 1 tablet (25 mg total) by mouth every 6 (six) hours as needed for anxiety   ipratropium-albuterol (DUO-NEB) 0.5-2.5 mg/3 mL nebulizer solution   No No   Sig: Take 3 mL by nebulization 4 (four) times a day   montelukast (SINGULAIR) 10 mg tablet   No No   Sig: Take 1 tablet (10 mg total) by mouth daily at bedtime   montelukast (SINGULAIR) 10 mg tablet   No No   Sig: Take 1 tablet (10 mg total) by mouth daily at bedtime      Facility-Administered Medications: None     Current Discharge Medication List        CONTINUE these medications which have NOT CHANGED    Details   albuterol (2.5 mg/3 mL) 0.083 % nebulizer solution Take 3 mL (2.5 mg total) by nebulization every 6 (six) hours as needed for wheezing or shortness of breath  Qty: 75 mL, Refills: 0    Associated Diagnoses: Asthma exacerbation      albuterol (PROVENTIL HFA,VENTOLIN  HFA) 90 mcg/act inhaler Inhale 2 puffs 4 (four) times a day  Qty: 18 g, Refills: 0    Comments: Substitution to a formulary equivalent within the same pharmaceutical class is authorized.  Associated Diagnoses: Moderate persistent asthma without complication      fluticasone (FLONASE) 50 mcg/act nasal spray 1 spray into each nostril daily    Associated Diagnoses: Moderate persistent asthma with acute exacerbation      fluticasone-umeclidinium-vilanterol (Trelegy Ellipta) 200-62.5-25 mcg/actuation AEPB inhaler Inhale 1 puff daily Rinse mouth after use.  Qty: 180 blister, Refills: 0    Associated Diagnoses: Severe persistent asthma with acute exacerbation      hydrOXYzine HCL (ATARAX) 25 mg tablet Take 1 tablet (25 mg total) by mouth every 6 (six) hours as needed for anxiety  Qty: 30 tablet, Refills: 0    Associated Diagnoses: Anxiety      ipratropium-albuterol (DUO-NEB) 0.5-2.5 mg/3 mL nebulizer solution Take 3 mL by nebulization 4 (four) times a day  Qty: 180 mL, Refills: 0    Associated Diagnoses: Severe persistent asthma with acute exacerbation      montelukast (SINGULAIR) 10 mg tablet Take 1 tablet (10 mg total) by mouth daily at bedtime  Qty: 30 tablet, Refills: 0    Associated Diagnoses: Asthma exacerbation           No discharge procedures on file.  ED SEPSIS DOCUMENTATION   Time reflects when diagnosis was documented in both MDM as applicable and the Disposition within this note       Time User Action Codes Description Comment    4/21/2025  6:03 PM Eder Escobar [J45.901] Acute asthma exacerbation     4/21/2025  6:04 PM Eder Escobar [U07.1] COVID-19     4/21/2025  6:04 PM Eder Escobar [J10.1] Influenza B                  Eder Escobar PA-C  04/21/25 1848       Eder Escobar PA-C  04/21/25 2035

## 2025-04-21 NOTE — ASSESSMENT & PLAN NOTE
Patient is not on oxygen but has high risk features  Start remdesivir  IV Solu-Medrol in place of Decadron

## 2025-04-22 LAB
ANION GAP SERPL CALCULATED.3IONS-SCNC: 6 MMOL/L (ref 4–13)
BUN SERPL-MCNC: 12 MG/DL (ref 5–25)
CALCIUM SERPL-MCNC: 8.8 MG/DL (ref 8.4–10.2)
CHLORIDE SERPL-SCNC: 106 MMOL/L (ref 96–108)
CO2 SERPL-SCNC: 23 MMOL/L (ref 21–32)
CREAT SERPL-MCNC: 0.56 MG/DL (ref 0.6–1.3)
ERYTHROCYTE [DISTWIDTH] IN BLOOD BY AUTOMATED COUNT: 15.2 % (ref 11.6–15.1)
GFR SERPL CREATININE-BSD FRML MDRD: 134 ML/MIN/1.73SQ M
GLUCOSE SERPL-MCNC: 105 MG/DL (ref 65–140)
HCT VFR BLD AUTO: 34.5 % (ref 34.8–46.1)
HGB BLD-MCNC: 10.5 G/DL (ref 11.5–15.4)
MCH RBC QN AUTO: 26.6 PG (ref 26.8–34.3)
MCHC RBC AUTO-ENTMCNC: 30.4 G/DL (ref 31.4–37.4)
MCV RBC AUTO: 87 FL (ref 82–98)
PLATELET # BLD AUTO: 364 THOUSANDS/UL (ref 149–390)
PMV BLD AUTO: 10.6 FL (ref 8.9–12.7)
POTASSIUM SERPL-SCNC: 4.4 MMOL/L (ref 3.5–5.3)
RBC # BLD AUTO: 3.95 MILLION/UL (ref 3.81–5.12)
SODIUM SERPL-SCNC: 135 MMOL/L (ref 135–147)
WBC # BLD AUTO: 12.95 THOUSAND/UL (ref 4.31–10.16)

## 2025-04-22 PROCEDURE — 80048 BASIC METABOLIC PNL TOTAL CA: CPT | Performed by: INTERNAL MEDICINE

## 2025-04-22 PROCEDURE — 94760 N-INVAS EAR/PLS OXIMETRY 1: CPT

## 2025-04-22 PROCEDURE — 99232 SBSQ HOSP IP/OBS MODERATE 35: CPT | Performed by: PHYSICIAN ASSISTANT

## 2025-04-22 PROCEDURE — 94640 AIRWAY INHALATION TREATMENT: CPT

## 2025-04-22 PROCEDURE — 94664 DEMO&/EVAL PT USE INHALER: CPT

## 2025-04-22 PROCEDURE — 94668 MNPJ CHEST WALL SBSQ: CPT

## 2025-04-22 PROCEDURE — 94669 MECHANICAL CHEST WALL OSCILL: CPT

## 2025-04-22 PROCEDURE — 85027 COMPLETE CBC AUTOMATED: CPT | Performed by: INTERNAL MEDICINE

## 2025-04-22 RX ADMIN — GUAIFENESIN AND DEXTROMETHORPHAN 10 ML: 100; 10 SYRUP ORAL at 17:02

## 2025-04-22 RX ADMIN — IPRATROPIUM BROMIDE AND ALBUTEROL SULFATE 3 ML: .5; 3 SOLUTION RESPIRATORY (INHALATION) at 19:43

## 2025-04-22 RX ADMIN — MONTELUKAST 10 MG: 10 TABLET, FILM COATED ORAL at 22:01

## 2025-04-22 RX ADMIN — IPRATROPIUM BROMIDE AND ALBUTEROL SULFATE 3 ML: .5; 3 SOLUTION RESPIRATORY (INHALATION) at 12:38

## 2025-04-22 RX ADMIN — OSELTAMIVIR 75 MG: 75 CAPSULE ORAL at 22:07

## 2025-04-22 RX ADMIN — METHYLPREDNISOLONE SODIUM SUCCINATE 40 MG: 40 INJECTION, POWDER, FOR SOLUTION INTRAMUSCULAR; INTRAVENOUS at 08:57

## 2025-04-22 RX ADMIN — GUAIFENESIN AND DEXTROMETHORPHAN 10 ML: 100; 10 SYRUP ORAL at 08:57

## 2025-04-22 RX ADMIN — ALBUTEROL SULFATE 2.5 MG: 2.5 SOLUTION RESPIRATORY (INHALATION) at 23:48

## 2025-04-22 RX ADMIN — GUAIFENESIN AND DEXTROMETHORPHAN 10 ML: 100; 10 SYRUP ORAL at 22:01

## 2025-04-22 RX ADMIN — IPRATROPIUM BROMIDE AND ALBUTEROL SULFATE 3 ML: .5; 3 SOLUTION RESPIRATORY (INHALATION) at 15:45

## 2025-04-22 RX ADMIN — REMDESIVIR 100 MG: 100 INJECTION, POWDER, LYOPHILIZED, FOR SOLUTION INTRAVENOUS at 21:59

## 2025-04-22 RX ADMIN — IPRATROPIUM BROMIDE AND ALBUTEROL SULFATE 3 ML: .5; 3 SOLUTION RESPIRATORY (INHALATION) at 07:39

## 2025-04-22 RX ADMIN — OSELTAMIVIR 75 MG: 75 CAPSULE ORAL at 08:57

## 2025-04-22 NOTE — PLAN OF CARE
Problem: PAIN - ADULT  Goal: Verbalizes/displays adequate comfort level or baseline comfort level  Description: Interventions:- Encourage patient to monitor pain and request assistance- Assess pain using appropriate pain scale- Administer analgesics based on type and severity of pain and evaluate response- Implement non-pharmacological measures as appropriate and evaluate response- Consider cultural and social influences on pain and pain management- Notify physician/advanced practitioner if interventions unsuccessful or patient reports new pain  Outcome: Progressing     Problem: INFECTION - ADULT  Goal: Absence or prevention of progression during hospitalization  Description: INTERVENTIONS:- Assess and monitor for signs and symptoms of infection- Monitor lab/diagnostic results- Monitor all insertion sites, i.e. indwelling lines, tubes, and drains- Monitor endotracheal if appropriate and nasal secretions for changes in amount and color- Dexter appropriate cooling/warming therapies per order- Administer medications as ordered- Instruct and encourage patient and family to use good hand hygiene technique- Identify and instruct in appropriate isolation precautions for identified infection/condition  Outcome: Progressing  Goal: Absence of fever/infection during neutropenic period  Description: INTERVENTIONS:- Monitor WBC  Outcome: Progressing     Problem: SAFETY ADULT  Goal: Patient will remain free of falls  Description: INTERVENTIONS:- Educate patient/family on patient safety including physical limitations- Instruct patient to call for assistance with activity - Consult OT/PT to assist with strengthening/mobility - Keep Call bell within reach- Keep bed low and locked with side rails adjusted as appropriate- Keep care items and personal belongings within reach- Initiate and maintain comfort rounds- Make Fall Risk Sign visible to staff- Offer Toileting every  Hours, in advance of need- Initiate/Maintain larm- Obtain  necessary fall risk management equipment: - Apply yellow socks and bracelet for high fall risk patients- Consider moving patient to room near nurses station  Outcome: Progressing  Goal: Maintain or return to baseline ADL function  Description: INTERVENTIONS:-  Assess patient's ability to carry out ADLs; assess patient's baseline for ADL function and identify physical deficits which impact ability to perform ADLs (bathing, care of mouth/teeth, toileting, grooming, dressing, etc.)- Assess/evaluate cause of self-care deficits - Assess range of motion- Assess patient's mobility; develop plan if impaired- Assess patient's need for assistive devices and provide as appropriate- Encourage maximum independence but intervene and supervise when necessary- Involve family in performance of ADLs- Assess for home care needs following discharge - Consider OT consult to assist with ADL evaluation and planning for discharge- Provide patient education as appropriate  Outcome: Progressing  Goal: Maintains/Returns to pre admission functional level  Description: INTERVENTIONS:- Perform AM-PAC 6 Click Basic Mobility/ Daily Activity assessment daily.- Set and communicate daily mobility goal to care team and patient/family/caregiver. - Collaborate with rehabilitation services on mobility goals if consulted- Perform Range of Motion  times a day.- Reposition patient every  hours.- Dangle patient  times a day- Stand patient times a day- Ambulate patient  times a day- Out of bed to chair  times a day - Out of bed for meals times a day- Out of bed for toileting- Record patient progress and toleration of activity level   Outcome: Progressing     Problem: DISCHARGE PLANNING  Goal: Discharge to home or other facility with appropriate resources  Description: INTERVENTIONS:- Identify barriers to discharge w/patient and caregiver- Arrange for needed discharge resources and transportation as appropriate- Identify discharge learning needs (meds, wound  care, etc.)- Arrange for interpretive services to assist at discharge as needed- Refer to Case Management Department for coordinating discharge planning if the patient needs post-hospital services based on physician/advanced practitioner order or complex needs related to functional status, cognitive ability, or social support system  Outcome: Progressing     Problem: Knowledge Deficit  Goal: Patient/family/caregiver demonstrates understanding of disease process, treatment plan, medications, and discharge instructions  Description: Complete learning assessment and assess knowledge base.Interventions:- Provide teaching at level of understanding- Provide teaching via preferred learning methods  Outcome: Progressing

## 2025-04-22 NOTE — ASSESSMENT & PLAN NOTE
Patient presents with acute asthma exacerbation in setting of influenza, COVID  Notes continued improvement but still not back to baseline, she had difficulty sleeping due to wheezing overnight  Will continue scheduled nebulizers  Continue IV Solu-Medrol, switch to p.o. prednisone tomorrow  Treatment for COVID and influenza  SpO2 is remained stable on room air, hopeful for discharge tomorrow

## 2025-04-22 NOTE — UTILIZATION REVIEW
Initial Clinical Review    Admission: Date/Time/Statement:   Admission Orders (From admission, onward)       Ordered        04/21/25 1817  Place in Observation  Once                          Orders Placed This Encounter   Procedures    Place in Observation     Standing Status:   Standing     Number of Occurrences:   1     Level of Care:   Med Surg [16]     ED Arrival Information       Expected   -    Arrival   4/21/2025 15:27    Acuity   Emergent              Means of arrival   Walk-In    Escorted by   Family Member    Service   Hospitalist    Admission type   Emergency              Arrival complaint   trouble breathing, asthma             Chief Complaint   Patient presents with    Asthma     Patient c/o asthma exacerbation, patient states sob, patient states seen here yesterday for same, wheezing in triage,        Initial Presentation: 19 y.o. female who presented self from home to [facility] ED.  Pertinent PMHx: asthma. Presented w/ SOB. Second presentation in two days. Today: tachypneic w/ accessory muscle use, productive cough. ED gave heart neb, IV solu-medrol & IV mag. Respiratory panel positive: COVID-19 and Influenza B. EXAM: wheezing, prolonged expiration. WBC 14.41. Admitted as Observation for evaluation and treatment of COVID-19, Influenza B. PLAN: nebs, IV solu-medrol daily, start IV remdesivir and Tamiflu, supportive care.     ED Treatment-Medication Administration from 04/21/2025 1527 to 04/21/2025 2020         Date/Time Order Dose Route Action     04/21/2025 1548 albuterol inhalation solution 10 mg 10 mg Nebulization Given     04/21/2025 1549 ipratropium (ATROVENT) 0.02 % inhalation solution 1 mg 1 mg Nebulization Given     04/21/2025 1549 sodium chloride 0.9 % inhalation solution 12 mL 12 mL Nebulization Given     04/21/2025 1552 methylPREDNISolone sodium succinate (Solu-MEDROL) injection 125 mg 125 mg Intravenous Given     04/21/2025 1553 magnesium sulfate 2 g/50 mL IVPB (premix) 2 g 2 g  Intravenous New Bag     04/21/2025 1759 albuterol inhalation solution 5 mg 5 mg Nebulization Given     04/21/2025 1759 ipratropium (ATROVENT) 0.02 % inhalation solution 0.5 mg 0.5 mg Nebulization Given     04/21/2025 1903 dextromethorphan-guaiFENesin (ROBITUSSIN DM) oral syrup 10 mL 10 mL Oral Given     04/21/2025 1904 remdesivir (Veklury) 200 mg in sodium chloride 0.9 % 290 mL IVPB 200 mg Intravenous New Bag     04/21/2025 1903 oseltamivir (TAMIFLU) capsule 75 mg 75 mg Oral Given            Scheduled Medications:  dextromethorphan-guaiFENesin, 10 mL, Oral, TID  ipratropium-albuterol, 3 mL, Nebulization, 4x Daily  methylPREDNISolone sodium succinate, 40 mg, Intravenous, Daily  montelukast, 10 mg, Oral, HS  oseltamivir, 75 mg, Oral, Q12H THOMAS  remdesivir, 100 mg, Intravenous, Q24H    Continuous IV Infusions: none    PRN Meds:  acetaminophen, 650 mg, Oral, Q6H PRN  albuterol, 2.5 mg, Nebulization, Q6H PRN  aluminum-magnesium hydroxide-simethicone, 30 mL, Oral, Q6H PRN  hydrOXYzine HCL, 25 mg, Oral, Q6H PRN  ondansetron, 4 mg, Intravenous, Q6H PRN      ED Triage Vitals   Temperature Pulse Respirations Blood Pressure SpO2 Pain Score   04/21/25 1544 04/21/25 1531 04/21/25 1531 04/21/25 1531 04/21/25 1531 04/21/25 2033   98.6 °F (37 °C) (!) 122 (!) 24 131/61 96 % No Pain     Weight (last 2 days)       Date/Time Weight    04/21/25 20:25:06 103 (227.74)            Vital Signs (last 3 days)       Date/Time Temp Pulse Resp BP MAP (mmHg) SpO2 O2 Device Patient Position - Orthostatic VS [place] Coma Scale Score Pain    04/22/25 07:39:41 97.9 °F (36.6 °C) 73 20 112/64 80 98 % -- -- -- --    04/22/25 0739 -- -- -- -- -- -- None (Room air) -- -- --    04/22/25 00:10:34 98.6 °F (37 °C) 100 -- 130/77 95 97 % -- -- -- --    04/21/25 2100 -- -- -- -- -- 98 % -- -- -- --    04/21/25 2046 -- -- -- -- -- 97 % -- -- 15 No Pain    04/21/25 2033 -- -- -- -- -- -- -- -- -- No Pain    04/21/25 20:25:06 98.5 °F (36.9 °C) 106 19 131/74 93 97 %  "None (Room air) Sitting -- --    04/21/25 1830 -- 115 20 131/61 88 97 % None (Room air) Sitting -- --    04/21/25 1800 -- 101 20 130/61 87 96 % None (Room air) Sitting -- --    04/21/25 1730 -- 102 20 118/58 83 97 % None (Room air) Sitting -- --    04/21/25 1700 -- 109 18 129/58 84 95 % None (Room air) Sitting -- --    04/21/25 1630 -- 98 22 124/73 88 99 % None (Room air) Sitting -- --    04/21/25 1602 -- -- -- -- -- -- -- -- 15 --    04/21/25 1545 -- 97 24 122/66 86 99 % None (Room air) Sitting -- --    04/21/25 1544 98.6 °F (37 °C) -- -- -- -- -- -- -- -- --    04/21/25 1531 -- 122 24 131/61 -- 96 % -- Sitting -- --              Pertinent Labs/Diagnostic Test Results:   Radiology:  XR chest 1 view portable   ED Interpretation by Eder Escobar PA-C (04/21 1712)   ED Interpretation: No acute cardiopulmonary disease.        Cardiology:  ECG 12 lead   Final Result by Randall Moreno DO (04/21 1615)   Sinus rhythm with sinus arrhythmia with short AR   Otherwise normal ECG   When compared with ECG of 09-Jan-2025 00:42,   Nonspecific T wave abnormality has replaced inverted T waves in Inferior    leads   T wave inversion no longer evident in Lateral leads   Confirmed by Randall Moreno ([id number]) on 4/21/2025 4:15:51 PM               Results from last 7 days   Lab Units 04/22/25  0636 04/21/25  1551   WBC Thousand/uL 12.95* 14.41*   HEMOGLOBIN g/dL 10.5* 11.5   HEMATOCRIT % 34.5* 36.0   PLATELETS Thousands/uL 364 356   TOTAL NEUT ABS Thousands/µL  --  7.04         Results from last 7 days   Lab Units 04/22/25  0636 04/21/25  1551   SODIUM mmol/L 135 137   POTASSIUM mmol/L 4.4 3.7   CHLORIDE mmol/L 106 109*   CO2 mmol/L 23 21   ANION GAP mmol/L 6 7   BUN mg/dL 12 11   CREATININE mg/dL 0.56* 0.71   EGFR ml/min/1.73sq m 134 122   CALCIUM mg/dL 8.8 8.8             Results from last 7 days   Lab Units 04/22/25  0636 04/21/25  1551   GLUCOSE RANDOM mg/dL 105 89             No results found for: \"BETA-HYDROXYBUTYRATE\"         "                                                                                                                                 Past Medical History:   Diagnosis Date    Asthma     Leukocytosis 6/21/2022    SIRS (systemic inflammatory response syndrome) (HCC) 12/16/2023     Present on Admission:   Anxiety   Morbid obesity due to excess calories (HCC)   Severe persistent asthma with acute exacerbation   Eosinophilic asthma      Admitting Diagnosis: Asthma [J45.909]  Influenza B [J10.1]  Acute asthma exacerbation [J45.901]  COVID-19 [U07.1]  Age/Sex: 19 y.o. female    Network Utilization Review Department  ATTENTION: Please call with any questions or concerns to [phone] and carefully listen to the prompts so that you are directed to the right person. All voicemails are confidential.   For Discharge needs, contact Care Management DC Support Team at [phone] opt. 2  Send all requests for admission clinical reviews, approved or denied determinations and any other requests to dedicated fax number below belonging to the campus where the patient is receiving treatment. List of dedicated fax numbers for the Facilities:  FACILITY NAME UR FAX NUMBER   ADMISSION DENIALS (Administrative/Medical Necessity) [phone]   DISCHARGE SUPPORT TEAM (NETWORK) [phone]   PARENT CHILD HEALTH (Maternity/NICU/Pediatrics) [phone]   General acute hospital [phone]   [facility] [phone]   [facility] [phone]   [facility] [phone]   [facility] [phone]   [facility] [phone]   [facility] [phone]   [facility] [phone]   [facility] [phone]   [facility] [phone]   [facility] [phone]   [facility]  [facility] [phone]

## 2025-04-22 NOTE — UTILIZATION REVIEW
Initial Clinical Review - Continued    SEE INITIAL REVIEW AT BOTTOM    Date: 04/22/25                         Current Patient Class: Inpatient  Current Level of Care: Med/Surg    HPI:19 y.o. female initially admitted on 4/21/2025 as OBS, changed to IP on 04/22/25 for COVID-19, Influenza B, Severe persistent asthma with acute exacerbation.      Assessment/Plan: Pt reports difficulty sleeping s/t hot/cold flashes and wheezing. Does report overall improved since admission. Exam: wheezing. Plan: continue nebs, IV solu-medrol, IV remdesivir, Tamiflu, continue current meds. Trend labs, replete electrolytes as needed. I&O.       Medications:   Scheduled Medications:  dextromethorphan-guaiFENesin, 10 mL, Oral, TID  ipratropium-albuterol, 3 mL, Nebulization, 4x Daily  methylPREDNISolone sodium succinate, 40 mg, Intravenous, Daily  montelukast, 10 mg, Oral, HS  oseltamivir, 75 mg, Oral, Q12H THOMAS  remdesivir, 100 mg, Intravenous, Q24H    Continuous IV Infusions: none    PRN Meds:  acetaminophen, 650 mg, Oral, Q6H PRN  albuterol, 2.5 mg, Nebulization, Q6H PRN  aluminum-magnesium hydroxide-simethicone, 30 mL, Oral, Q6H PRN  hydrOXYzine HCL, 25 mg, Oral, Q6H PRN  ondansetron, 4 mg, Intravenous, Q6H PRN        Vital Signs:   Vital Signs (last 3 days)       Date/Time Temp Pulse Resp BP MAP (mmHg) SpO2 O2 Device Patient Position - Orthostatic VS [place] Coma Scale Score Pain    04/22/25 0900 -- -- -- -- -- -- -- -- -- No Pain    04/22/25 07:39:41 97.9 °F (36.6 °C) 73 20 112/64 80 98 % -- -- -- --    04/22/25 0739 -- -- -- -- -- -- None (Room air) -- -- --    04/22/25 00:10:34 98.6 °F (37 °C) 100 -- 130/77 95 97 % -- -- -- --    04/21/25 2100 -- -- -- -- -- 98 % -- -- -- --    04/21/25 2046 -- -- -- -- -- 97 % -- -- 15 No Pain    04/21/25 2033 -- -- -- -- -- -- -- -- -- No Pain    04/21/25 20:25:06 98.5 °F (36.9 °C) 106 19 131/74 93 97 % None (Room air) Sitting -- --    04/21/25 1830 -- 115 20 131/61 88 97 % None (Room air) Sitting  "-- --    04/21/25 1800 -- 101 20 130/61 87 96 % None (Room air) Sitting -- --    04/21/25 1730 -- 102 20 118/58 83 97 % None (Room air) Sitting -- --    04/21/25 1700 -- 109 18 129/58 84 95 % None (Room air) Sitting -- --    04/21/25 1630 -- 98 22 124/73 88 99 % None (Room air) Sitting -- --    04/21/25 1602 -- -- -- -- -- -- -- -- 15 --    04/21/25 1545 -- 97 24 122/66 86 99 % None (Room air) Sitting -- --    04/21/25 1544 98.6 °F (37 °C) -- -- -- -- -- -- -- -- --    04/21/25 1531 -- 122 24 131/61 -- 96 % -- Sitting -- --            Pertinent Labs/Diagnostic Results:   Radiology:  XR chest 1 view portable   ED Interpretation by Eder Escobar PA-C (04/21 1712)   ED Interpretation: No acute cardiopulmonary disease.       by Robert Emery MD (04/22 6240)        Cardiology:  ECG 12 lead   Final Result by Randall Moreno DO (04/21 1615)   Sinus rhythm with sinus arrhythmia with short [place]   Otherwise normal ECG   When compared with ECG of 09-Jan-2025 00:42,   Nonspecific T wave abnormality has replaced inverted T waves in Inferior    leads   T wave inversion no longer evident in Lateral leads   Confirmed by Randall Moreno ([id number]) on 4/21/2025 4:15:51 PM             Results from last 7 days   Lab Units 04/22/25  0636 04/21/25  1551   WBC Thousand/uL 12.95* 14.41*   HEMOGLOBIN g/dL 10.5* 11.5   HEMATOCRIT % 34.5* 36.0   PLATELETS Thousands/uL 364 356   TOTAL NEUT ABS Thousands/µL  --  7.04         Results from last 7 days   Lab Units 04/22/25  0636 04/21/25  1551   SODIUM mmol/L 135 137   POTASSIUM mmol/L 4.4 3.7   CHLORIDE mmol/L 106 109*   CO2 mmol/L 23 21   ANION GAP mmol/L 6 7   BUN mg/dL 12 11   CREATININE mg/dL 0.56* 0.71   EGFR ml/min/1.73sq m 134 122   CALCIUM mg/dL 8.8 8.8             Results from last 7 days   Lab Units 04/22/25  0636 04/21/25  1551   GLUCOSE RANDOM mg/dL 105 89             No results found for: \"BETA-HYDROXYBUTYRATE\"                                                                         "                                                                 Network Utilization Review Department  ATTENTION: Please call with any questions or concerns to [phone] and carefully listen to the prompts so that you are directed to the right person. All voicemails are confidential.   For Discharge needs, contact Care Management DC Support Team at [phone] opt. 2  Send all requests for admission clinical reviews, approved or denied determinations and any other requests to dedicated fax number below belonging to the campus where the patient is receiving treatment. List of dedicated fax numbers for the Facilities:  FACILITY NAME UR FAX NUMBER   ADMISSION DENIALS (Administrative/Medical Necessity) [phone]   DISCHARGE SUPPORT TEAM (NETWORK) [phone]   PARENT CHILD HEALTH (Maternity/NICU/Pediatrics) [phone]   [facility] [phone]   [facility] [phone]   [facility] [phone]   [facility] [phone]   [facility] [phone]   [facility] [phone]   [facility] [phone]   [facility] [phone]   [facility] [phone]   [facility] [phone]   [facility] [phone]   [facility] [phone]

## 2025-04-22 NOTE — PROGRESS NOTES
Progress Note - Hospitalist   Name: Lakhwinder Puckett 19 y.o. female I MRN: [id number]  Unit/Bed#: E5 -01 I Date of Admission: 4/21/2025   Date of Service: 4/22/2025 I Hospital Day: 0    Assessment & Plan  Severe persistent asthma with acute exacerbation  Patient presents with acute asthma exacerbation in setting of influenza, COVID  Notes continued improvement but still not back to baseline, she had difficulty sleeping due to wheezing overnight  Will continue scheduled nebulizers  Continue IV Solu-Medrol, switch to p.o. prednisone tomorrow  Treatment for COVID and influenza  SpO2 is remained stable on room air, hopeful for discharge tomorrow  Anxiety  Atarax as needed  Morbid obesity due to excess calories (HCC)  Recommend dietary and excise modification  Eosinophilic asthma  Continue Singulair  When patient obtains insurance consider Dupixent  COVID-19  Patient is not on oxygen but has high risk features  Start remdesivir  IV Solu-Medrol in place of Decadron  Influenza  Continue Tamiflu  Insurance coverage problems  Unable to obtain chronic medications as she is without insurance  Case management consultation    VTE Pharmacologic Prophylaxis: VTE Score: 2 Low Risk (Score 0-2) - Encourage Ambulation.    Mobility:   Basic Mobility Inpatient Raw Score: 24  JH-HLM Goal: 8: Walk 250 feet or more  JH-HLM Achieved: 7: Walk 25 feet or more  JH-HLM Goal NOT achieved. Continue with multidisciplinary rounding and encourage appropriate mobility to improve upon JH-HLM goals.    Patient Centered Rounds: I performed bedside rounds with nursing staff today.   Discussions with Specialists or Other Care Team Provider: Case management    Current Length of Stay: 0 day(s)  Current Patient Status: Observation   Certification Statement: The patient will continue to require additional inpatient hospital stay due to severe persistent asthma with COVID and flu requiring IV steroids and scheduled nebulizers  Discharge Plan: Anticipate  discharge in 24-48 hrs to home.    Code Status: Level 1 - Full Code    Subjective   Patient seen and examined at bedside.  Notes she feels improved since admission but still up at baseline.  Had trouble sleeping overnight due to feeling hot and cold and wheezing.      Objective :  Temp:  [97.9 °F (36.6 °C)-98.6 °F (37 °C)] 97.9 °F (36.6 °C)  HR:  [] 73  BP: (112-131)/(58-77) 112/64  Resp:  [18-24] 20  SpO2:  [95 %-99 %] 98 %  O2 Device: None (Room air)    Body mass index is 40.34 kg/m².     Input and Output Summary (last 24 hours):     Intake/Output Summary (Last 24 hours) at 4/22/2025 1058  Last data filed at 4/22/2025 0901  Gross per 24 hour   Intake 290 ml   Output --   Net 290 ml       Physical Exam  Vitals reviewed.   Constitutional:       General: She is not in acute distress.  HENT:      Head: Normocephalic and atraumatic.   Eyes:      General: No scleral icterus.     Conjunctiva/sclera: Conjunctivae normal.   Cardiovascular:      Rate and Rhythm: Normal rate and regular rhythm.      Heart sounds: No murmur heard.  Pulmonary:      Effort: Pulmonary effort is normal. No respiratory distress.      Breath sounds: Wheezing (Scattered end expiratory) present.      Comments: Decreased at bases  Abdominal:      General: Bowel sounds are normal. There is no distension.      Palpations: Abdomen is soft.      Tenderness: There is no abdominal tenderness.   Musculoskeletal:      Cervical back: Neck supple.      Right lower leg: No edema.      Left lower leg: No edema.   Skin:     General: Skin is warm and dry.   Neurological:      Mental Status: She is alert and oriented to person, place, and time.   Psychiatric:         Mood and Affect: Mood normal.         Behavior: Behavior normal.           Lines/Drains:              Lab Results: I have reviewed the following results:   Results from last 7 days   Lab Units 04/22/25  0636 04/21/25  1551   WBC Thousand/uL 12.95* 14.41*   HEMOGLOBIN g/dL 10.5* 11.5   HEMATOCRIT %  34.5* 36.0   PLATELETS Thousands/uL 364 356   SEGS PCT %  --  49   LYMPHO PCT %  --  39   MONO PCT %  --  6   EOS PCT %  --  5     Results from last 7 days   Lab Units 04/22/25  0636   SODIUM mmol/L 135   POTASSIUM mmol/L 4.4   CHLORIDE mmol/L 106   CO2 mmol/L 23   BUN mg/dL 12   CREATININE mg/dL 0.56*   ANION GAP mmol/L 6   CALCIUM mg/dL 8.8   GLUCOSE RANDOM mg/dL 105                       Recent Cultures (last 7 days):               Last 24 Hours Medication List:     Current Facility-Administered Medications:     acetaminophen (TYLENOL) tablet 650 mg, Q6H PRN    albuterol inhalation solution 2.5 mg, Q6H PRN    aluminum-magnesium hydroxide-simethicone (MAALOX) oral suspension 30 mL, Q6H PRN    dextromethorphan-guaiFENesin (ROBITUSSIN DM) oral syrup 10 mL, TID    hydrOXYzine HCL (ATARAX) tablet 25 mg, Q6H PRN    ipratropium-albuterol (DUO-NEB) 0.5-2.5 mg/3 mL inhalation solution 3 mL, 4x Daily    methylPREDNISolone sodium succinate (Solu-MEDROL) injection 40 mg, Daily    montelukast (SINGULAIR) tablet 10 mg, HS    ondansetron (ZOFRAN) injection 4 mg, Q6H PRN    oseltamivir (TAMIFLU) capsule 75 mg, Q12H THOMAS    [COMPLETED] remdesivir (Veklury) 200 mg in sodium chloride 0.9 % 290 mL IVPB, Q24H, Last Rate: Stopped (04/22/25 0624) **FOLLOWED BY** remdesivir (Veklury) 100 mg in sodium chloride 0.9 % 270 mL IVPB, Q24H    Administrative Statements   Today, Patient Was Seen By: Estefania Orozco PA-C      **Please Note: This note may have been constructed using a voice recognition system.**

## 2025-04-22 NOTE — PLAN OF CARE
Problem: PAIN - ADULT  Goal: Verbalizes/displays adequate comfort level or baseline comfort level  Description: Interventions:- Encourage patient to monitor pain and request assistance- Assess pain using appropriate pain scale- Administer analgesics based on type and severity of pain and evaluate response- Implement non-pharmacological measures as appropriate and evaluate response- Consider cultural and social influences on pain and pain management- Notify physician/advanced practitioner if interventions unsuccessful or patient reports new pain  Outcome: Progressing     Problem: INFECTION - ADULT  Goal: Absence or prevention of progression during hospitalization  Description: INTERVENTIONS:- Assess and monitor for signs and symptoms of infection- Monitor lab/diagnostic results- Monitor all insertion sites, i.e. indwelling lines, tubes, and drains- Monitor endotracheal if appropriate and nasal secretions for changes in amount and color- [place] appropriate cooling/warming therapies per order- Administer medications as ordered- Instruct and encourage patient and family to use good hand hygiene technique- Identify and instruct in appropriate isolation precautions for identified infection/condition  Outcome: Progressing  Goal: Absence of fever/infection during neutropenic period  Description: INTERVENTIONS:- Monitor WBC  Outcome: Progressing     Problem: SAFETY ADULT  Goal: Patient will remain free of falls  Description: INTERVENTIONS:- Educate patient/family on patient safety including physical limitations- Instruct patient to call for assistance with activity - Consult OT/PT to assist with strengthening/mobility - Keep Call bell within reach- Keep bed low and locked with side rails adjusted as appropriate- Keep care items and personal belongings within reach- Initiate and maintain comfort rounds- Make Fall Risk Sign visible to staff- Offer Toileting every 2 Hours, in advance of need- Initiate/Maintain bed alarm-  Obtain necessary fall risk management equipment- Apply yellow socks and bracelet for high fall risk patients- Consider moving patient to room near nurses station  Outcome: Progressing  Goal: Maintain or return to baseline ADL function  Description: INTERVENTIONS:-  Assess patient's ability to carry out ADLs; assess patient's baseline for ADL function and identify physical deficits which impact ability to perform ADLs (bathing, care of mouth/teeth, toileting, grooming, dressing, etc.)- Assess/evaluate cause of self-care deficits - Assess range of motion- Assess patient's mobility; develop plan if impaired- Assess patient's need for assistive devices and provide as appropriate- Encourage maximum independence but intervene and supervise when necessary- Involve family in performance of ADLs- Assess for home care needs following discharge - Consider OT consult to assist with ADL evaluation and planning for discharge- Provide patient education as appropriate  Outcome: Progressing  Goal: Maintains/Returns to pre admission functional level  Description: INTERVENTIONS:- Perform AM-PAC 6 Click Basic Mobility/ Daily Activity assessment daily.- Set and communicate daily mobility goal to care team and patient/family/caregiver. - Collaborate with rehabilitation services on mobility goals if consulted- Reposition patient every 2 hours.- Stand patient 4 times a day- Ambulate patient 4 times a day- Out of bed to chair 4 times a day - Out of bed for meals 3 times a day- Out of bed for toileting- Record patient progress and toleration of activity level   Outcome: Progressing     Problem: DISCHARGE PLANNING  Goal: Discharge to home or other facility with appropriate resources  Description: INTERVENTIONS:- Identify barriers to discharge w/patient and caregiver- Arrange for needed discharge resources and transportation as appropriate- Identify discharge learning needs (meds, wound care, etc.)- Arrange for interpretive services to assist  at discharge as needed- Refer to Case Management Department for coordinating discharge planning if the patient needs post-hospital services based on physician/advanced practitioner order or complex needs related to functional status, cognitive ability, or social support system  Outcome: Progressing     Problem: Knowledge Deficit  Goal: Patient/family/caregiver demonstrates understanding of disease process, treatment plan, medications, and discharge instructions  Description: Complete learning assessment and assess knowledge base.Interventions:- Provide teaching at level of understanding- Provide teaching via preferred learning methods  Outcome: Progressing      Home

## 2025-04-22 NOTE — CASE MANAGEMENT
Case Management Discharge Planning Note    Patient name Lakhwinder Puckett  Location [facility] /E5 -* MRN [id number]  : 2005 Date 2025       Current Admission Date: 2025  Current Admission Diagnosis:Severe persistent asthma with acute exacerbation   Patient Active Problem List    Diagnosis Date Noted Date Diagnosed    COVID-19 2025     Influenza 2025     Insurance coverage problems 2025     Peripheral eosinophilia 2024     Environmental allergies 2024     Eosinophilic asthma 2023     Severe persistent asthma with acute exacerbation 2023     Morbid obesity due to excess calories (HCC) 2023     Syncope 2022     Anxiety 05/10/2022       LOS (days): 0  Geometric Mean LOS (GMLOS) (days):   Days to GMLOS:     OBJECTIVE:            Current admission status: Observation   Preferred Pharmacy:   [id number] [place], [place] - [address]  [address]  [facility] [address]  Phone: [phone] Fax: [phone]     [facility] [place] - [address] [place] [address]  Phone: [phone] Fax: [phone]    [facility] [place], [place] - [address]  [facility],  [address]  [facility],  [facility] [address]  Phone: [phone] Fax: [phone]    Primary Care Provider: Elias Schoen, MD    Primary Insurance: VERONICA LEDESMA PENDING  Secondary Insurance:     DISCHARGE DETAILS:               Additional Comments: CM was consulted for outpatient resources. CM called patient, patient on contact isolation for FLu and Covid. Patient state sshe does not need any help or resources. CM asked about the positive screening for food insecurity. Patient states she is already working on it outpatient. Patient states she spoke with [employer] while here about insurance. They did a medicaid application for her. Patient states she works part-time and her employer does not offer insurance. CM offered to send  food panty or food stamp info, patient declined.

## 2025-04-22 NOTE — PLAN OF CARE
Problem: PAIN - ADULT  Goal: Verbalizes/displays adequate comfort level or baseline comfort level  Description: Interventions:- Encourage patient to monitor pain and request assistance- Assess pain using appropriate pain scale- Administer analgesics based on type and severity of pain and evaluate response- Implement non-pharmacological measures as appropriate and evaluate response- Consider cultural and social influences on pain and pain management- Notify physician/advanced practitioner if interventions unsuccessful or patient reports new pain  Outcome: Progressing     Problem: INFECTION - ADULT  Goal: Absence or prevention of progression during hospitalization  Description: INTERVENTIONS:- Assess and monitor for signs and symptoms of infection- Monitor lab/diagnostic results- Monitor all insertion sites, i.e. indwelling lines, tubes, and drains- Monitor endotracheal if appropriate and nasal secretions for changes in amount and color- [place] appropriate cooling/warming therapies per order- Administer medications as ordered- Instruct and encourage patient and family to use good hand hygiene technique- Identify and instruct in appropriate isolation precautions for identified infection/condition  Outcome: Progressing  Goal: Absence of fever/infection during neutropenic period  Description: INTERVENTIONS:- Monitor WBC  Outcome: Progressing     Problem: SAFETY ADULT  Goal: Patient will remain free of falls  Description: INTERVENTIONS:- Educate patient/family on patient safety including physical limitations- Instruct patient to call for assistance with activity - Consult OT/PT to assist with strengthening/mobility - Keep Call bell within reach- Keep bed low and locked with side rails adjusted as appropriate- Keep care items and personal belongings within reach- Initiate and maintain comfort rounds- Make Fall Risk Sign visible to staff- Obtain necessary fall risk management equipment: call bell,  socks- Apply  yellow socks and bracelet for high fall risk patients- Consider moving patient to room near nurses station  Outcome: Progressing  Goal: Maintain or return to baseline ADL function  Description: INTERVENTIONS:-  Assess patient's ability to carry out ADLs; assess patient's baseline for ADL function and identify physical deficits which impact ability to perform ADLs (bathing, care of mouth/teeth, toileting, grooming, dressing, etc.)- Assess/evaluate cause of self-care deficits - Assess range of motion- Assess patient's mobility; develop plan if impaired- Assess patient's need for assistive devices and provide as appropriate- Encourage maximum independence but intervene and supervise when necessary- Involve family in performance of ADLs- Assess for home care needs following discharge - Consider OT consult to assist with ADL evaluation and planning for discharge- Provide patient education as appropriate  Outcome: Progressing  Goal: Maintains/Returns to pre admission functional level  Description: INTERVENTIONS:- Perform AM-PAC 6 Click Basic Mobility/ Daily Activity assessment daily.- Set and communicate daily mobility goal to care team and patient/family/caregiver. - Collaborate with rehabilitation services on mobility goals if consulted- Perform Range of Motion 3 times a day.- Reposition patient every 2 hours.- Dangle patient 3 times a day- Stand patient 3 times a day- Ambulate patient 3 times a day- Out of bed to chair 3 times a day - Out of bed for meals 3 times a day- Out of bed for toileting- Record patient progress and toleration of activity level   Outcome: Progressing     Problem: DISCHARGE PLANNING  Goal: Discharge to home or other facility with appropriate resources  Description: INTERVENTIONS:- Identify barriers to discharge w/patient and caregiver- Arrange for needed discharge resources and transportation as appropriate- Identify discharge learning needs (meds, wound care, etc.)- Arrange for interpretive  services to assist at discharge as needed- Refer to Case Management Department for coordinating discharge planning if the patient needs post-hospital services based on physician/advanced practitioner order or complex needs related to functional status, cognitive ability, or social support system  Outcome: Progressing     Problem: Knowledge Deficit  Goal: Patient/family/caregiver demonstrates understanding of disease process, treatment plan, medications, and discharge instructions  Description: Complete learning assessment and assess knowledge base.Interventions:- Provide teaching at level of understanding- Provide teaching via preferred learning methods  Outcome: Progressing

## 2025-04-23 VITALS
WEIGHT: 227.74 LBS | OXYGEN SATURATION: 98 % | HEIGHT: 63 IN | HEART RATE: 92 BPM | RESPIRATION RATE: 18 BRPM | TEMPERATURE: 97.9 F | BODY MASS INDEX: 40.35 KG/M2 | SYSTOLIC BLOOD PRESSURE: 124 MMHG | DIASTOLIC BLOOD PRESSURE: 64 MMHG

## 2025-04-23 PROCEDURE — 94640 AIRWAY INHALATION TREATMENT: CPT

## 2025-04-23 PROCEDURE — 99239 HOSP IP/OBS DSCHRG MGMT >30: CPT | Performed by: PHYSICIAN ASSISTANT

## 2025-04-23 PROCEDURE — 94760 N-INVAS EAR/PLS OXIMETRY 1: CPT

## 2025-04-23 RX ORDER — PREDNISONE 20 MG/1
40 TABLET ORAL DAILY
Qty: 10 TABLET | Refills: 0 | Status: SHIPPED | OUTPATIENT
Start: 2025-04-23 | End: 2025-04-28

## 2025-04-23 RX ORDER — OSELTAMIVIR PHOSPHATE 75 MG/1
75 CAPSULE ORAL EVERY 12 HOURS SCHEDULED
Qty: 6 CAPSULE | Refills: 0 | Status: SHIPPED | OUTPATIENT
Start: 2025-04-23 | End: 2025-04-26

## 2025-04-23 RX ADMIN — IPRATROPIUM BROMIDE AND ALBUTEROL SULFATE 3 ML: .5; 3 SOLUTION RESPIRATORY (INHALATION) at 08:42

## 2025-04-23 RX ADMIN — OSELTAMIVIR 75 MG: 75 CAPSULE ORAL at 09:26

## 2025-04-23 RX ADMIN — METHYLPREDNISOLONE SODIUM SUCCINATE 40 MG: 40 INJECTION, POWDER, FOR SOLUTION INTRAMUSCULAR; INTRAVENOUS at 09:26

## 2025-04-23 RX ADMIN — GUAIFENESIN AND DEXTROMETHORPHAN 10 ML: 100; 10 SYRUP ORAL at 09:26

## 2025-04-23 NOTE — ASSESSMENT & PLAN NOTE
Patient presents with acute asthma exacerbation in setting of influenza, COVID  Patient with significant improvement in her symptoms today, slept well overnight, no wheezing on exam  Will continue scheduled nebulizers  Received IV Solu-Medrol while inpatient, switch to p.o. prednisone taper at discharge  Treatment for COVID and influenza, complete course of Tamiflu outpatient  SpO2 has remained stable on room air during hospital stay

## 2025-04-23 NOTE — PLAN OF CARE
Problem: PAIN - ADULT  Goal: Verbalizes/displays adequate comfort level or baseline comfort level  Description: Interventions:- Encourage patient to monitor pain and request assistance- Assess pain using appropriate pain scale- Administer analgesics based on type and severity of pain and evaluate response- Implement non-pharmacological measures as appropriate and evaluate response- Consider cultural and social influences on pain and pain management- Notify physician/advanced practitioner if interventions unsuccessful or patient reports new pain  Outcome: Progressing     Problem: INFECTION - ADULT  Goal: Absence or prevention of progression during hospitalization  Description: INTERVENTIONS:- Assess and monitor for signs and symptoms of infection- Monitor lab/diagnostic results- Monitor all insertion sites, i.e. indwelling lines, tubes, and drains- Monitor endotracheal if appropriate and nasal secretions for changes in amount and color- [place] appropriate cooling/warming therapies per order- Administer medications as ordered- Instruct and encourage patient and family to use good hand hygiene technique- Identify and instruct in appropriate isolation precautions for identified infection/condition  Outcome: Progressing  Goal: Absence of fever/infection during neutropenic period  Description: INTERVENTIONS:- Monitor WBC  Outcome: Progressing     Problem: SAFETY ADULT  Goal: Patient will remain free of falls  Description: INTERVENTIONS:- Educate patient/family on patient safety including physical limitations- Instruct patient to call for assistance with activity - Consult OT/PT to assist with strengthening/mobility - Keep Call bell within reach- Keep bed low and locked with side rails adjusted as appropriate- Keep care items and personal belongings within reach- Initiate and maintain comfort rounds- Make Fall Risk Sign visible to staff- Offer Toileting every 2-4 Hours, in advance of need- Initiate/Maintain call  bellalarm- Obtain necessary fall risk management equipment: nonskid socks - Apply yellow socks and bracelet for high fall risk patients- Consider moving patient to room near nurses station  Outcome: Progressing  Goal: Maintain or return to baseline ADL function  Description: INTERVENTIONS:-  Assess patient's ability to carry out ADLs; assess patient's baseline for ADL function and identify physical deficits which impact ability to perform ADLs (bathing, care of mouth/teeth, toileting, grooming, dressing, etc.)- Assess/evaluate cause of self-care deficits - Assess range of motion- Assess patient's mobility; develop plan if impaired- Assess patient's need for assistive devices and provide as appropriate- Encourage maximum independence but intervene and supervise when necessary- Involve family in performance of ADLs- Assess for home care needs following discharge - Consider OT consult to assist with ADL evaluation and planning for discharge- Provide patient education as appropriate  Outcome: Progressing  Goal: Maintains/Returns to pre admission functional level  Description: INTERVENTIONS:- Perform AM-PAC 6 Click Basic Mobility/ Daily Activity assessment daily.- Set and communicate daily mobility goal to care team and patient/family/caregiver. - Collaborate with rehabilitation services on mobility goals if consulted- Perform Range of Motion 3 times a day.- Reposition patient every 3 hours.- Dangle patient 3 times a day- Stand patient 3 times a day- Ambulate patient 3 times a day- Out of bed to chair 3 times a day - Out of bed for meals 3 times a day- Out of bed for toileting- Record patient progress and toleration of activity level   Outcome: Progressing     Problem: DISCHARGE PLANNING  Goal: Discharge to home or other facility with appropriate resources  Description: INTERVENTIONS:- Identify barriers to discharge w/patient and caregiver- Arrange for needed discharge resources and transportation as appropriate-  Identify discharge learning needs (meds, wound care, etc.)- Arrange for interpretive services to assist at discharge as needed- Refer to Case Management Department for coordinating discharge planning if the patient needs post-hospital services based on physician/advanced practitioner order or complex needs related to functional status, cognitive ability, or social support system  Outcome: Progressing     Problem: Knowledge Deficit  Goal: Patient/family/caregiver demonstrates understanding of disease process, treatment plan, medications, and discharge instructions  Description: Complete learning assessment and assess knowledge base.Interventions:- Provide teaching at level of understanding- Provide teaching via preferred learning methods  Outcome: Progressing

## 2025-04-23 NOTE — PLAN OF CARE
Problem: PAIN - ADULT  Goal: Verbalizes/displays adequate comfort level or baseline comfort level  Description: Interventions:- Encourage patient to monitor pain and request assistance- Assess pain using appropriate pain scale- Administer analgesics based on type and severity of pain and evaluate response- Implement non-pharmacological measures as appropriate and evaluate response- Consider cultural and social influences on pain and pain management- Notify physician/advanced practitioner if interventions unsuccessful or patient reports new pain  Outcome: Progressing     Problem: INFECTION - ADULT  Goal: Absence or prevention of progression during hospitalization  Description: INTERVENTIONS:- Assess and monitor for signs and symptoms of infection- Monitor lab/diagnostic results- Monitor all insertion sites, i.e. indwelling lines, tubes, and drains- Monitor endotracheal if appropriate and nasal secretions for changes in amount and color- [place] appropriate cooling/warming therapies per order- Administer medications as ordered- Instruct and encourage patient and family to use good hand hygiene technique- Identify and instruct in appropriate isolation precautions for identified infection/condition  Outcome: Progressing  Goal: Absence of fever/infection during neutropenic period  Description: INTERVENTIONS:- Monitor WBC  Outcome: Progressing     Problem: SAFETY ADULT  Goal: Patient will remain free of falls  Description: INTERVENTIONS:- Educate patient/family on patient safety including physical limitations- Instruct patient to call for assistance with activity - Consult OT/PT to assist with strengthening/mobility - Keep Call bell within reach- Keep bed low and locked with side rails adjusted as appropriate- Keep care items and personal belongings within reach- Initiate and maintain comfort rounds- Make Fall Risk Sign visible to staff- Apply yellow socks and bracelet for high fall risk patients- Consider moving  patient to room near nurses station  Outcome: Progressing  Goal: Maintain or return to baseline ADL function  Description: INTERVENTIONS:-  Assess patient's ability to carry out ADLs; assess patient's baseline for ADL function and identify physical deficits which impact ability to perform ADLs (bathing, care of mouth/teeth, toileting, grooming, dressing, etc.)- Assess/evaluate cause of self-care deficits - Assess range of motion- Assess patient's mobility; develop plan if impaired- Assess patient's need for assistive devices and provide as appropriate- Encourage maximum independence but intervene and supervise when necessary- Involve family in performance of ADLs- Assess for home care needs following discharge - Consider OT consult to assist with ADL evaluation and planning for discharge- Provide patient education as appropriate  Outcome: Progressing  Goal: Maintains/Returns to pre admission functional level  Description: INTERVENTIONS:- Perform AM-PAC 6 Click Basic Mobility/ Daily Activity assessment daily.- Set and communicate daily mobility goal to care team and patient/family/caregiver. - Collaborate with rehabilitation services on mobility goals if consulted- Perform Range of Motion 3 times a day.- Reposition patient every 2 hours.- Dangle patient 3 times a day- Stand patient 3 times a day- Ambulate patient 3 times a day- Out of bed to chair 3 times a day - Out of bed for meals 3 times a day- Out of bed for toileting- Record patient progress and toleration of activity level   Outcome: Progressing     Problem: DISCHARGE PLANNING  Goal: Discharge to home or other facility with appropriate resources  Description: INTERVENTIONS:- Identify barriers to discharge w/patient and caregiver- Arrange for needed discharge resources and transportation as appropriate- Identify discharge learning needs (meds, wound care, etc.)- Arrange for interpretive services to assist at discharge as needed- Refer to Case Management  Department for coordinating discharge planning if the patient needs post-hospital services based on physician/advanced practitioner order or complex needs related to functional status, cognitive ability, or social support system  Outcome: Progressing     Problem: Knowledge Deficit  Goal: Patient/family/caregiver demonstrates understanding of disease process, treatment plan, medications, and discharge instructions  Description: Complete learning assessment and assess knowledge base.Interventions:- Provide teaching at level of understanding- Provide teaching via preferred learning methods  Outcome: Progressing

## 2025-04-23 NOTE — DISCHARGE SUMMARY
Discharge Summary - Hospitalist   Name: Lakhwinder Puckett 19 y.o. female I MRN: [id number]  Unit/Bed#: E5 -01 I Date of Admission: 4/21/2025   Date of Service: 4/23/2025 I Hospital Day: 1     Assessment & Plan  Severe persistent asthma with acute exacerbation  Patient presents with acute asthma exacerbation in setting of influenza, COVID  Patient with significant improvement in her symptoms today, slept well overnight, no wheezing on exam  Will continue scheduled nebulizers  Received IV Solu-Medrol while inpatient, switch to p.o. prednisone taper at discharge  Treatment for COVID and influenza, complete course of Tamiflu outpatient  SpO2 has remained stable on room air during hospital stay  Anxiety  Atarax as needed  Morbid obesity due to excess calories (HCC)  Recommend dietary and excise modification  Eosinophilic asthma  Continue Singulair  When patient obtains insurance consider Dupixent  COVID-19  Patient is not on oxygen but has high risk features  Start remdesivir  IV Solu-Medrol in place of Decadron  Influenza  Continue Tamiflu  Insurance coverage problems  Unable to obtain chronic medications as she is without insurance  Case management consultation     Medical Problems       Resolved Problems  Date Reviewed: 3/1/2025   None       Discharging Physician / Practitioner: Estefania Orozco PA-C  PCP: Elias Schoen, MD  Admission Date:   Admission Orders (From admission, onward)       Ordered        04/22/25 1218  INPATIENT ADMISSION  Once            04/21/25 1817  Place in Observation  Once                          Discharge Date: 04/23/25    Consultations During Hospital Stay:  None    Procedures Performed:   XR chest 1 view portable  Result Date: 4/22/2025  Impression: No focal consolidation, pleural effusion, or pneumothorax.     Significant Findings / Test Results:   COVID-19 and influenza B positive    Incidental Findings:   None      Test Results Pending at Discharge (will require follow up):   None    "  Outpatient Tests Requested:  PCP follow-up    Complications: None    Reason for Admission: Shortness of breath    Hospital Course:   Lakhwinder Puckett is a 19 y.o. female patient who originally presented to the hospital on 4/21/2025 due to shortness of breath.  She tested positive for COVID-19 and influenza B.  She was wheezing on exam and required admission for asthma exacerbation.  She was treated with remdesivir and Tamiflu for her viral infections.  She was started on IV steroids and scheduled nebulizers which were continued throughout her hospital stay.  She was transition to p.o. prednisone at time of discharge.  On day of discharge, SpO2 was stable on room air and she noted significant improvement in her wheezing.  Agreeable for discharge home with PCP follow-up.      Please see above list of diagnoses and related plan for additional information.     Condition at Discharge: stable    Discharge Day Visit / Exam:   Subjective: Patient seen and examined at bedside.  Notes she is doing better today.  Was able to sleep last night and feels comfortable returning home.  Vitals: Blood Pressure: 124/64 (04/23/25 0714)  Pulse: 92 (04/23/25 0714)  Temperature: 97.9 °F (36.6 °C) (04/23/25 0714)  Temp Source: Oral (04/23/25 0715)  Respirations: 18 (04/23/25 0715)  Height: 5' 3\" (160 cm) (04/21/25 2025)  Weight - Scale: 103 kg (227 lb 11.8 oz) (04/21/25 2025)  SpO2: 98 % (04/23/25 0714)  Physical Exam  Vitals reviewed.   Constitutional:       General: She is not in acute distress.  HENT:      Head: Normocephalic and atraumatic.   Eyes:      General: No scleral icterus.     Conjunctiva/sclera: Conjunctivae normal.   Cardiovascular:      Rate and Rhythm: Normal rate and regular rhythm.      Heart sounds: No murmur heard.  Pulmonary:      Effort: Pulmonary effort is normal. No respiratory distress.      Breath sounds: Normal breath sounds.   Abdominal:      General: Bowel sounds are normal. There is no distension.      " Palpations: Abdomen is soft.      Tenderness: There is no abdominal tenderness.   Musculoskeletal:      Cervical back: Neck supple.      Right lower leg: No edema.      Left lower leg: No edema.   Skin:     General: Skin is warm and dry.   Neurological:      Mental Status: She is alert and oriented to person, place, and time.   Psychiatric:         Mood and Affect: Mood normal.         Behavior: Behavior normal.          Discussion with Family: Updated  (mother) at bedside.    Discharge instructions/Information to patient and family:   See after visit summary for information provided to patient and family.      Provisions for Follow-Up Care:  See after visit summary for information related to follow-up care and any pertinent home health orders.      Mobility at time of Discharge:   Basic Mobility Inpatient Raw Score: 24  JH-HLM Goal: 8: Walk 250 feet or more  JH-HLM Achieved: 8: Walk 250 feet ot more  HLM Goal achieved. Continue to encourage appropriate mobility.     Disposition:   Home    Planned Readmission: None    Discharge Medications:  See after visit summary for reconciled discharge medications provided to patient and/or family.      Administrative Statements   Discharge Statement:  I have spent a total time of 35 minutes in caring for this patient on the day of the visit/encounter. .    **Please Note: This note may have been constructed using a voice recognition system**

## 2025-04-24 DIAGNOSIS — Z59.41 FOOD INSECURITY: Primary | ICD-10-CM

## 2025-04-24 SDOH — ECONOMIC STABILITY - FOOD INSECURITY: FOOD INSECURITY: Z59.41

## 2025-04-25 ENCOUNTER — TRANSITIONAL CARE MANAGEMENT (OUTPATIENT)
Dept: FAMILY MEDICINE CLINIC | Facility: CLINIC | Age: 20
End: 2025-04-25

## 2025-05-02 ENCOUNTER — PATIENT OUTREACH (OUTPATIENT)
Dept: FAMILY MEDICINE CLINIC | Facility: CLINIC | Age: 20
End: 2025-05-02

## 2025-05-02 NOTE — PROGRESS NOTES
[facility] received new referral in regard to pt identified as having concerns with food insecurity. [facility] attempted to reach pt today and was unable. A message was left to please return [facility] call. [facility] will remain available.

## 2025-05-06 ENCOUNTER — APPOINTMENT (EMERGENCY)
Dept: RADIOLOGY | Facility: HOSPITAL | Age: 20
End: 2025-05-06

## 2025-05-06 ENCOUNTER — HOSPITAL ENCOUNTER (EMERGENCY)
Facility: HOSPITAL | Age: 20
Discharge: HOME | End: 2025-05-07
Attending: EMERGENCY MEDICINE
Payer: COMMERCIAL

## 2025-05-06 DIAGNOSIS — J45.901 ASTHMA EXACERBATION: Primary | ICD-10-CM

## 2025-05-06 PROCEDURE — 94640 AIRWAY INHALATION TREATMENT: CPT

## 2025-05-06 PROCEDURE — 94760 N-INVAS EAR/PLS OXIMETRY 1: CPT

## 2025-05-06 PROCEDURE — 94664 DEMO&/EVAL PT USE INHALER: CPT

## 2025-05-06 PROCEDURE — 94644 CONT INHLJ TX 1ST HOUR: CPT

## 2025-05-06 PROCEDURE — 99285 EMERGENCY DEPT VISIT HI MDM: CPT

## 2025-05-06 PROCEDURE — 99291 CRITICAL CARE FIRST HOUR: CPT | Performed by: EMERGENCY MEDICINE

## 2025-05-06 RX ORDER — SODIUM CHLORIDE FOR INHALATION 0.9 %
12 VIAL, NEBULIZER (ML) INHALATION ONCE
Status: COMPLETED | OUTPATIENT
Start: 2025-05-06 | End: 2025-05-06

## 2025-05-06 RX ORDER — ALBUTEROL SULFATE 5 MG/ML
10 SOLUTION RESPIRATORY (INHALATION) ONCE
Status: COMPLETED | OUTPATIENT
Start: 2025-05-06 | End: 2025-05-06

## 2025-05-06 RX ADMIN — ALBUTEROL SULFATE 10 MG: 2.5 SOLUTION RESPIRATORY (INHALATION) at 23:37

## 2025-05-06 RX ADMIN — ISODIUM CHLORIDE 12 ML: 0.03 SOLUTION RESPIRATORY (INHALATION) at 23:37

## 2025-05-06 RX ADMIN — IPRATROPIUM BROMIDE 1 MG: 0.5 SOLUTION RESPIRATORY (INHALATION) at 23:37

## 2025-05-06 NOTE — Clinical Note
Lakhwinder Puckett was seen and treated in our emergency department on 5/6/2025.                Diagnosis: Asthma Exacerbation    Lakhwinder  may return to school on return date.    She may return on this date: 05/08/2025         If you have any questions or concerns, please don't hesitate to call.      Yojana Alonso PA-C    ______________________________           _______________          _______________  Hospital Representative                              Date                                Time

## 2025-05-06 NOTE — Clinical Note
Lakhwinder Puckett was seen and treated in our emergency department on 5/6/2025.    No restrictions            Diagnosis: Asthma Exacerbation    Lakhwinder  may return to work on return date.    She may return on this date: 05/08/2025         If you have any questions or concerns, please don't hesitate to call.      Yojana Alonso PA-C    ______________________________           _______________          _______________  Hospital Representative                              Date                                Time

## 2025-05-07 ENCOUNTER — APPOINTMENT (EMERGENCY)
Dept: RADIOLOGY | Facility: HOSPITAL | Age: 20
End: 2025-05-07

## 2025-05-07 VITALS
RESPIRATION RATE: 18 BRPM | DIASTOLIC BLOOD PRESSURE: 55 MMHG | OXYGEN SATURATION: 97 % | TEMPERATURE: 99.3 F | SYSTOLIC BLOOD PRESSURE: 109 MMHG | HEART RATE: 111 BPM

## 2025-05-07 PROCEDURE — 71046 X-RAY EXAM CHEST 2 VIEWS: CPT

## 2025-05-07 RX ORDER — PREDNISONE 20 MG/1
60 TABLET ORAL ONCE
Status: COMPLETED | OUTPATIENT
Start: 2025-05-07 | End: 2025-05-07

## 2025-05-07 RX ORDER — PREDNISONE 20 MG/1
60 TABLET ORAL DAILY
Qty: 12 TABLET | Refills: 0 | Status: SHIPPED | OUTPATIENT
Start: 2025-05-07 | End: 2025-05-11

## 2025-05-07 RX ADMIN — PREDNISONE 60 MG: 20 TABLET ORAL at 01:11

## 2025-05-07 NOTE — ED PROVIDER NOTES
Time reflects when diagnosis was documented in both MDM as applicable and the Disposition within this note       Time User Action Codes Description Comment    5/7/2025  1:01 AM Carolina Alonsosea Add [J45.901] Asthma exacerbation           ED Disposition       ED Disposition   Discharge    Condition   Stable    Date/Time   Wed May 7, 2025  1:01 AM    Comment   Lakhwinder Puckett discharge to home/self care.             Assessment & Plan       Medical Decision Making      DDx including but not limited to: asthma exacerbation, URI, bronchitis, pneumonia    Patient presenting to the ED for evaluation of a suspected asthma exacerbation.  She denies any fevers or chills, does endorse some coughing.  She was recently hospitalized for asthma exacerbation induced by COVID/flu infection.  Patient is tachycardic on exam however did use a nebulizer just prior to arrival.  On exam she does have diffuse expiratory wheezing and some increased work of breathing.  She has decreased air movement as well on exam, because of this we will treat with a MACEDO neb.  Will give prednisone.  Will hold off on labs and IV medications at this time.  Will reevaluate for improvement of symptoms with nebulizer.  Will also order CXR for evaluation of acute pneumonia or other cardiopulmonary abnormalities causing symptoms.    Symptoms resolved with nebulizer here.  Wheezing resolved, increased air movement throughout without any difficulty.  Will discharge with 5-day burst of prednisone 60 mg.  Recommend follow-up with PCP.  Strict return precautions discussed with patient prior to discharge.    Prior to discharge, discharge instructions were discussed with patient at bedside. Patient was provided both verbal and written instructions. Patient is understanding of the discharge instructions and is agreeable to plan of care. Return precautions were discussed with patient bedside, patient verbalized understanding of signs and symptoms that would necessitate  return to the ED. All questions were answered. Patient was comfortable with the plan of care and discharged to home.     Dispo: discharge home with follow up to PCP. Patient stable, in no acute distress and non-toxic at discharge.    Amount and/or Complexity of Data Reviewed  Radiology: ordered and independent interpretation performed.     Details: No acute abnormalities as interpreted by myself.    Risk  Prescription drug management.             Medications   albuterol inhalation solution 10 mg (10 mg Nebulization Given 5/6/25 2337)   ipratropium (ATROVENT) 0.02 % inhalation solution 1 mg (1 mg Nebulization Given 5/6/25 2337)   sodium chloride 0.9 % inhalation solution 12 mL (12 mL Nebulization Given 5/6/25 2337)   predniSONE tablet 60 mg (60 mg Oral Given 5/7/25 0111)       ED Risk Strat Scores                    No data recorded                            History of Present Illness       Chief Complaint   Patient presents with    Asthma     Pt reports asthma attack that started an hour ago, wheezing in triage. Pt took nebulizer PTA with no relief.       Past Medical History:   Diagnosis Date    Asthma     Leukocytosis 6/21/2022    SIRS (systemic inflammatory response syndrome) (HCC) 12/16/2023      History reviewed. No pertinent surgical history.   Family History   Problem Relation Age of Onset    Thyroid disease Mother     No Known Problems Father       Social History     Tobacco Use    Smoking status: Never    Smokeless tobacco: Never   Vaping Use    Vaping status: Never Used   Substance Use Topics    Alcohol use: Not Currently    Drug use: Not Currently      E-Cigarette/Vaping    E-Cigarette Use Never User       E-Cigarette/Vaping Substances    Nicotine No     THC No     CBD No     Flavoring No     Other No     Unknown No       I have reviewed and agree with the history as documented.     This is a 19-year-old female presenting to ED for evaluation of suspected asthma exacerbation.  Patient states symptoms  started about an hour ago.  She states that she used her at home DuoNeb without any relief of symptoms.  She endorses shortness of breath and wheezing.  She denies any fevers or chills.  She does have a cough.  She was recently hospitalized for both influenza and COVID causing an asthma exacerbation.      History provided by:  Patient   used: No        Review of Systems   Constitutional:  Negative for chills and fever.   HENT:  Negative for congestion.    Respiratory:  Positive for cough, chest tightness, shortness of breath and wheezing.    Cardiovascular:  Negative for chest pain.   All other systems reviewed and are negative.          Objective       ED Triage Vitals [05/06/25 2306]   Temperature Pulse Blood Pressure Respirations SpO2 Patient Position - Orthostatic VS   99.3 °F (37.4 °C) (!) 109 131/59 18 95 % Sitting      Temp Source Heart Rate Source BP Location FiO2 (%) Pain Score    Oral Monitor Right arm -- --      Vitals      Date and Time Temp Pulse SpO2 Resp BP Pain Score FACES Pain Rating User   05/07/25 0112 -- 111 97 % 18 109/55 -- -- [place]   05/07/25 0015 -- 132 99 % 22 138/94 -- -- [place]   05/06/25 2340 -- 126 97 % 20 -- -- -- CD   05/06/25 2306 99.3 °F (37.4 °C) 109 95 % 18 131/59 -- -- [place]            Physical Exam  Vitals reviewed.   Constitutional:       General: She is not in acute distress.     Appearance: Normal appearance. She is well-developed and well-groomed. She is not ill-appearing.   HENT:      Head: Normocephalic and atraumatic.      Right Ear: External ear normal.      Left Ear: External ear normal.      Nose: Nose normal.   Eyes:      General: No scleral icterus.     Conjunctiva/sclera: Conjunctivae normal.   Cardiovascular:      Rate and Rhythm: Regular rhythm. Tachycardia present.   Pulmonary:      Effort: Accessory muscle usage present. No tachypnea.      Breath sounds: Decreased air movement present. No stridor. Wheezing present.      Comments: Diffuse expiratory  wheezing.  Abdominal:      General: There is no distension.   Musculoskeletal:         General: No deformity. Normal range of motion.      Cervical back: Normal range of motion.   Skin:     Coloration: Skin is not jaundiced or pale.      Findings: No lesion or rash.   Neurological:      Mental Status: She is alert and oriented to person, place, and time.   Psychiatric:         Mood and Affect: Mood normal.         Behavior: Behavior normal. Behavior is cooperative.         Results Reviewed       None            XR chest 2 views   ED Interpretation by Yojana Alonso PA-C ( 010)   No acute cardiopulmonary disease as interpreted by me at this time.          Procedures    Critical Care Time Statement: Upon my evaluation, this patient had a high probability of imminent or life-threatening deterioration due to respiratory distress, which required my direct attention, intervention, and personal management.  I spent a total of 30 minutes directly providing critical care services, including evaluating for the presence of life-threatening injuries or illnesses, complex medical decision making (to support/prevent further life-threatening deterioration)., interpretation of hemodynamic data, and MACEDO neb . This time is exclusive of procedures, teaching, treating other patients, family meetings, and any prior time recorded by providers other than myself.         ED Medication and Procedure Management   Prior to Admission Medications   Prescriptions Last Dose Informant Patient Reported? Taking?   albuterol (2.5 mg/3 mL) 0.083 % nebulizer solution   No No   Sig: Take 3 mL (2.5 mg total) by nebulization every 6 (six) hours as needed for wheezing or shortness of breath   albuterol (PROVENTIL HFA,VENTOLIN HFA) 90 mcg/act inhaler   No No   Sig: Inhale 2 puffs 4 (four) times a day   fluticasone (FLONASE) 50 mcg/act nasal spray   Yes No   Si spray into each nostril daily   fluticasone-umeclidinium-vilanterol (Trelegy Ellipta)  200-62.5-25 mcg/actuation AEPB inhaler   No No   Sig: Inhale 1 puff daily Rinse mouth after use.   hydrOXYzine HCL (ATARAX) 25 mg tablet   No No   Sig: Take 1 tablet (25 mg total) by mouth every 6 (six) hours as needed for anxiety   ipratropium-albuterol (DUO-NEB) 0.5-2.5 mg/3 mL nebulizer solution   No No   Sig: Take 3 mL by nebulization 4 (four) times a day   montelukast (SINGULAIR) 10 mg tablet   No No   Sig: Take 1 tablet (10 mg total) by mouth daily at bedtime   montelukast (SINGULAIR) 10 mg tablet   No No   Sig: Take 1 tablet (10 mg total) by mouth daily at bedtime      Facility-Administered Medications: None     Discharge Medication List as of 5/7/2025  1:25 AM        START taking these medications    Details   predniSONE 20 mg tablet Take 3 tablets (60 mg total) by mouth daily for 4 days, Starting Wed 5/7/2025, Until Sun 5/11/2025, Normal           CONTINUE these medications which have NOT CHANGED    Details   albuterol (2.5 mg/3 mL) 0.083 % nebulizer solution Take 3 mL (2.5 mg total) by nebulization every 6 (six) hours as needed for wheezing or shortness of breath, Starting Sun 3/2/2025, Normal      albuterol (PROVENTIL HFA,VENTOLIN HFA) 90 mcg/act inhaler Inhale 2 puffs 4 (four) times a day, Starting Sun 3/2/2025, Normal      fluticasone (FLONASE) 50 mcg/act nasal spray 1 spray into each nostril daily, Starting Sat 6/4/2022, Historical Med      fluticasone-umeclidinium-vilanterol (Trelegy Ellipta) 200-62.5-25 mcg/actuation AEPB inhaler Inhale 1 puff daily Rinse mouth after use., Starting Sun 3/2/2025, Until Sat 5/31/2025, Normal      hydrOXYzine HCL (ATARAX) 25 mg tablet Take 1 tablet (25 mg total) by mouth every 6 (six) hours as needed for anxiety, Starting Sun 3/2/2025, Normal      ipratropium-albuterol (DUO-NEB) 0.5-2.5 mg/3 mL nebulizer solution Take 3 mL by nebulization 4 (four) times a day, Starting Sun 3/2/2025, Normal      montelukast (SINGULAIR) 10 mg tablet Take 1 tablet (10 mg total) by mouth  daily at bedtime, Starting Sun 4/20/2025, Normal           No discharge procedures on file.  ED SEPSIS DOCUMENTATION   Time reflects when diagnosis was documented in both MDM as applicable and the Disposition within this note       Time User Action Codes Description Comment    5/7/2025  1:01 AM Yojana Alonso Add [J45.901] Asthma exacerbation                  Yojana Alonso PA-C  05/07/25 0202

## 2025-05-08 ENCOUNTER — PATIENT OUTREACH (OUTPATIENT)
Dept: FAMILY MEDICINE CLINIC | Facility: CLINIC | Age: 20
End: 2025-05-08

## 2025-05-08 NOTE — PROGRESS NOTES
ADITYA BAKER placed second call to the patient, Lakhwinder and left message. ADITYA BAKER sent Unable to Reach letter via [employer] and will close referral due to no response.

## 2025-05-08 NOTE — LETTER
[address] [place] [address], [address]  [facility] [id number] [phone]    Re: Unable to Reach    5/8/2025       Dear Lakhwinder,    I would like to talk with you about community resources.  Please contact me at [phone].    If you have other questions, please do not hesitate to contact me about those as well.  If I do not have an answer I will assist you in finding the appropriate agency or individual who can help.    Sincerely,         Josselin Dixon

## 2025-05-21 PROBLEM — J11.1 INFLUENZA: Status: RESOLVED | Noted: 2025-04-21 | Resolved: 2025-05-21

## 2025-05-29 ENCOUNTER — HOSPITAL ENCOUNTER (INPATIENT)
Facility: HOSPITAL | Age: 20
LOS: 1 days | Discharge: HOME | End: 2025-05-30
Admitting: HOSPITALIST
Payer: COMMERCIAL

## 2025-05-29 ENCOUNTER — APPOINTMENT (EMERGENCY)
Dept: RADIOLOGY | Facility: HOSPITAL | Age: 20
End: 2025-05-29
Payer: COMMERCIAL

## 2025-05-29 DIAGNOSIS — J45.901 ASTHMA WITH ACUTE EXACERBATION: Primary | ICD-10-CM

## 2025-05-29 LAB
ANION GAP SERPL CALCULATED.3IONS-SCNC: 8 MMOL/L (ref 4–13)
BASE EX.OXY STD BLDV CALC-SCNC: 90.3 % (ref 60–80)
BASE EXCESS BLDV CALC-SCNC: -3.6 MMOL/L
BASOPHILS # BLD AUTO: 0.06 THOUSANDS/ÂΜL (ref 0–0.1)
BASOPHILS NFR BLD AUTO: 0 % (ref 0–1)
BUN SERPL-MCNC: 13 MG/DL (ref 5–25)
CALCIUM SERPL-MCNC: 9.3 MG/DL (ref 8.4–10.2)
CHLORIDE SERPL-SCNC: 108 MMOL/L (ref 96–108)
CO2 SERPL-SCNC: 21 MMOL/L (ref 21–32)
CREAT SERPL-MCNC: 0.66 MG/DL (ref 0.6–1.3)
EOSINOPHIL # BLD AUTO: 0.07 THOUSAND/ÂΜL (ref 0–0.61)
EOSINOPHIL NFR BLD AUTO: 0 % (ref 0–6)
ERYTHROCYTE [DISTWIDTH] IN BLOOD BY AUTOMATED COUNT: 15.1 % (ref 11.6–15.1)
FLUAV AG UPPER RESP QL IA.RAPID: NEGATIVE
FLUBV AG UPPER RESP QL IA.RAPID: POSITIVE
GFR SERPL CREATININE-BSD FRML MDRD: 126 ML/MIN/1.73SQ M
GLUCOSE SERPL-MCNC: 105 MG/DL (ref 65–140)
HCO3 BLDV-SCNC: 20 MMOL/L (ref 24–30)
HCT VFR BLD AUTO: 36.9 % (ref 34.8–46.1)
HGB BLD-MCNC: 11.5 G/DL (ref 11.5–15.4)
IMM GRANULOCYTES # BLD AUTO: 0.13 THOUSAND/UL (ref 0–0.2)
IMM GRANULOCYTES NFR BLD AUTO: 1 % (ref 0–2)
LYMPHOCYTES # BLD AUTO: 2.95 THOUSANDS/ÂΜL (ref 0.6–4.47)
LYMPHOCYTES NFR BLD AUTO: 17 % (ref 14–44)
MCH RBC QN AUTO: 26.5 PG (ref 26.8–34.3)
MCHC RBC AUTO-ENTMCNC: 31.2 G/DL (ref 31.4–37.4)
MCV RBC AUTO: 85 FL (ref 82–98)
MONOCYTES # BLD AUTO: 1.1 THOUSAND/ÂΜL (ref 0.17–1.22)
MONOCYTES NFR BLD AUTO: 7 % (ref 4–12)
NEUTROPHILS # BLD AUTO: 12.68 THOUSANDS/ÂΜL (ref 1.85–7.62)
NEUTS SEG NFR BLD AUTO: 75 % (ref 43–75)
NRBC BLD AUTO-RTO: 0 /100 WBCS
O2 CT BLDV-SCNC: 14.8 ML/DL
PCO2 BLDV: 31.4 MM HG (ref 42–50)
PH BLDV: 7.42 [PH] (ref 7.3–7.4)
PLATELET # BLD AUTO: 445 THOUSANDS/UL (ref 149–390)
PMV BLD AUTO: 10.9 FL (ref 8.9–12.7)
PO2 BLDV: 60.6 MM HG (ref 35–45)
POTASSIUM SERPL-SCNC: 4.3 MMOL/L (ref 3.5–5.3)
RBC # BLD AUTO: 4.34 MILLION/UL (ref 3.81–5.12)
SARS-COV+SARS-COV-2 AG RESP QL IA.RAPID: POSITIVE
SODIUM SERPL-SCNC: 137 MMOL/L (ref 135–147)
WBC # BLD AUTO: 16.99 THOUSAND/UL (ref 4.31–10.16)

## 2025-05-29 PROCEDURE — 87804 INFLUENZA ASSAY W/OPTIC: CPT

## 2025-05-29 PROCEDURE — 94640 AIRWAY INHALATION TREATMENT: CPT

## 2025-05-29 PROCEDURE — 99285 EMERGENCY DEPT VISIT HI MDM: CPT

## 2025-05-29 PROCEDURE — 87811 SARS-COV-2 COVID19 W/OPTIC: CPT

## 2025-05-29 PROCEDURE — 94664 DEMO&/EVAL PT USE INHALER: CPT

## 2025-05-29 PROCEDURE — 36415 COLL VENOUS BLD VENIPUNCTURE: CPT

## 2025-05-29 PROCEDURE — 99291 CRITICAL CARE FIRST HOUR: CPT

## 2025-05-29 PROCEDURE — 94644 CONT INHLJ TX 1ST HOUR: CPT

## 2025-05-29 PROCEDURE — 96365 THER/PROPH/DIAG IV INF INIT: CPT

## 2025-05-29 PROCEDURE — 94760 N-INVAS EAR/PLS OXIMETRY 1: CPT

## 2025-05-29 PROCEDURE — 99222 1ST HOSP IP/OBS MODERATE 55: CPT | Performed by: HOSPITALIST

## 2025-05-29 PROCEDURE — 96375 TX/PRO/DX INJ NEW DRUG ADDON: CPT

## 2025-05-29 PROCEDURE — 71045 X-RAY EXAM CHEST 1 VIEW: CPT

## 2025-05-29 PROCEDURE — 85025 COMPLETE CBC W/AUTO DIFF WBC: CPT

## 2025-05-29 PROCEDURE — 80048 BASIC METABOLIC PNL TOTAL CA: CPT

## 2025-05-29 PROCEDURE — 82805 BLOOD GASES W/O2 SATURATION: CPT

## 2025-05-29 RX ORDER — LEVALBUTEROL INHALATION SOLUTION 1.25 MG/3ML
1.25 SOLUTION RESPIRATORY (INHALATION)
Status: DISCONTINUED | OUTPATIENT
Start: 2025-05-29 | End: 2025-05-30 | Stop reason: HOSPADM

## 2025-05-29 RX ORDER — ALBUTEROL SULFATE 0.83 MG/ML
5 SOLUTION RESPIRATORY (INHALATION) ONCE
Status: COMPLETED | OUTPATIENT
Start: 2025-05-29 | End: 2025-05-29

## 2025-05-29 RX ORDER — ALBUTEROL SULFATE 5 MG/ML
10 SOLUTION RESPIRATORY (INHALATION) ONCE
Status: COMPLETED | OUTPATIENT
Start: 2025-05-29 | End: 2025-05-29

## 2025-05-29 RX ORDER — LORATADINE 10 MG/1
10 TABLET ORAL DAILY
Status: DISCONTINUED | OUTPATIENT
Start: 2025-05-29 | End: 2025-05-30 | Stop reason: HOSPADM

## 2025-05-29 RX ORDER — ONDANSETRON 2 MG/ML
4 INJECTION INTRAMUSCULAR; INTRAVENOUS EVERY 6 HOURS PRN
Status: DISCONTINUED | OUTPATIENT
Start: 2025-05-29 | End: 2025-05-30 | Stop reason: HOSPADM

## 2025-05-29 RX ORDER — MAGNESIUM HYDROXIDE/ALUMINUM HYDROXICE/SIMETHICONE 120; 1200; 1200 MG/30ML; MG/30ML; MG/30ML
30 SUSPENSION ORAL EVERY 6 HOURS PRN
Status: DISCONTINUED | OUTPATIENT
Start: 2025-05-29 | End: 2025-05-30 | Stop reason: HOSPADM

## 2025-05-29 RX ORDER — HYDROXYZINE HYDROCHLORIDE 25 MG/1
25 TABLET, FILM COATED ORAL EVERY 6 HOURS PRN
Status: DISCONTINUED | OUTPATIENT
Start: 2025-05-29 | End: 2025-05-30 | Stop reason: HOSPADM

## 2025-05-29 RX ORDER — ACETAMINOPHEN 325 MG/1
975 TABLET ORAL EVERY 6 HOURS PRN
Status: DISCONTINUED | OUTPATIENT
Start: 2025-05-29 | End: 2025-05-30 | Stop reason: HOSPADM

## 2025-05-29 RX ORDER — SODIUM CHLORIDE FOR INHALATION 0.9 %
3 VIAL, NEBULIZER (ML) INHALATION
Status: DISCONTINUED | OUTPATIENT
Start: 2025-05-29 | End: 2025-05-29

## 2025-05-29 RX ORDER — MONTELUKAST SODIUM 10 MG/1
10 TABLET ORAL
Status: DISCONTINUED | OUTPATIENT
Start: 2025-05-29 | End: 2025-05-30 | Stop reason: HOSPADM

## 2025-05-29 RX ORDER — BUDESONIDE 0.5 MG/2ML
0.5 INHALANT ORAL
Status: DISCONTINUED | OUTPATIENT
Start: 2025-05-29 | End: 2025-05-30 | Stop reason: HOSPADM

## 2025-05-29 RX ORDER — MAGNESIUM SULFATE HEPTAHYDRATE 40 MG/ML
2 INJECTION, SOLUTION INTRAVENOUS ONCE
Status: COMPLETED | OUTPATIENT
Start: 2025-05-29 | End: 2025-05-29

## 2025-05-29 RX ORDER — METHYLPREDNISOLONE SODIUM SUCCINATE 40 MG/ML
40 INJECTION, POWDER, LYOPHILIZED, FOR SOLUTION INTRAMUSCULAR; INTRAVENOUS EVERY 6 HOURS SCHEDULED
Status: DISCONTINUED | OUTPATIENT
Start: 2025-05-29 | End: 2025-05-30 | Stop reason: HOSPADM

## 2025-05-29 RX ORDER — METHYLPREDNISOLONE SODIUM SUCCINATE 125 MG/2ML
125 INJECTION, POWDER, LYOPHILIZED, FOR SOLUTION INTRAMUSCULAR; INTRAVENOUS ONCE
Status: COMPLETED | OUTPATIENT
Start: 2025-05-29 | End: 2025-05-29

## 2025-05-29 RX ORDER — SODIUM CHLORIDE FOR INHALATION 0.9 %
12 VIAL, NEBULIZER (ML) INHALATION ONCE
Status: COMPLETED | OUTPATIENT
Start: 2025-05-29 | End: 2025-05-29

## 2025-05-29 RX ORDER — POLYETHYLENE GLYCOL 3350 17 G/17G
17 POWDER, FOR SOLUTION ORAL DAILY PRN
Status: DISCONTINUED | OUTPATIENT
Start: 2025-05-29 | End: 2025-05-30 | Stop reason: HOSPADM

## 2025-05-29 RX ADMIN — IPRATROPIUM BROMIDE 0.5 MG: 0.5 SOLUTION RESPIRATORY (INHALATION) at 04:51

## 2025-05-29 RX ADMIN — IPRATROPIUM BROMIDE 0.5 MG: 0.5 SOLUTION RESPIRATORY (INHALATION) at 12:14

## 2025-05-29 RX ADMIN — MONTELUKAST 10 MG: 10 TABLET, FILM COATED ORAL at 21:00

## 2025-05-29 RX ADMIN — BUDESONIDE 0.5 MG: 0.5 INHALANT ORAL at 20:17

## 2025-05-29 RX ADMIN — BUDESONIDE 0.5 MG: 0.5 INHALANT ORAL at 12:14

## 2025-05-29 RX ADMIN — MAGNESIUM SULFATE HEPTAHYDRATE 2 G: 40 INJECTION, SOLUTION INTRAVENOUS at 05:02

## 2025-05-29 RX ADMIN — IPRATROPIUM BROMIDE 1 MG: 0.5 SOLUTION RESPIRATORY (INHALATION) at 05:04

## 2025-05-29 RX ADMIN — LORATADINE 10 MG: 10 TABLET ORAL at 08:28

## 2025-05-29 RX ADMIN — ISODIUM CHLORIDE 12 ML: 0.03 SOLUTION RESPIRATORY (INHALATION) at 05:04

## 2025-05-29 RX ADMIN — IPRATROPIUM BROMIDE 0.5 MG: 0.5 SOLUTION RESPIRATORY (INHALATION) at 20:17

## 2025-05-29 RX ADMIN — LEVALBUTEROL HYDROCHLORIDE 1.25 MG: 1.25 SOLUTION RESPIRATORY (INHALATION) at 12:14

## 2025-05-29 RX ADMIN — SODIUM CHLORIDE 1000 ML: 0.9 INJECTION, SOLUTION INTRAVENOUS at 05:01

## 2025-05-29 RX ADMIN — ALBUTEROL SULFATE 10 MG: 2.5 SOLUTION RESPIRATORY (INHALATION) at 05:04

## 2025-05-29 RX ADMIN — METHYLPREDNISOLONE SODIUM SUCCINATE 40 MG: 40 INJECTION, POWDER, FOR SOLUTION INTRAMUSCULAR; INTRAVENOUS at 17:47

## 2025-05-29 RX ADMIN — METHYLPREDNISOLONE SODIUM SUCCINATE 125 MG: 125 INJECTION, POWDER, FOR SOLUTION INTRAMUSCULAR; INTRAVENOUS at 04:59

## 2025-05-29 RX ADMIN — LEVALBUTEROL HYDROCHLORIDE 1.25 MG: 1.25 SOLUTION RESPIRATORY (INHALATION) at 20:17

## 2025-05-29 RX ADMIN — METHYLPREDNISOLONE SODIUM SUCCINATE 40 MG: 40 INJECTION, POWDER, FOR SOLUTION INTRAMUSCULAR; INTRAVENOUS at 12:56

## 2025-05-29 RX ADMIN — ALBUTEROL SULFATE 5 MG: 2.5 SOLUTION RESPIRATORY (INHALATION) at 04:51

## 2025-05-29 NOTE — ED PROVIDER NOTES
Time reflects when diagnosis was documented in both MDM as applicable and the Disposition within this note       Time User Action Codes Description Comment    5/29/2025  5:56 AM Tone Stout Add [J45.901] Asthma with acute exacerbation           ED Disposition       ED Disposition   Admit    Condition   Stable    Date/Time   Thu May 29, 2025  5:56 AM    Comment   Case was discussed with ELISSA and the patient's admission status was agreed to be Admission Status: observation status to the service of Dr. Anne .               Assessment & Plan       Medical Decision Making  Patient with history as below presented with a suspected acute asthma exacerbation. Patient presents with vitals notable for tachypnea, tachycardia. I considered the patient's chronic medical conditions including their asthma in forming my differential diagnosis, plan, and my medical decision making. I also reviewed their external records including admission 4/21/2025. History obtained from patient.    After initial evaluation differential diagnosis includes: Asthma with acute exacerbation, viral URI, pneumonia, pneumothorax.     Plan: Initial testing to include CBC, BMP, VBG, chest x-ray. Initial therapeutics to include will give nebulizer while waiting for respiratory to bring gutierrez neb, Solu-Medrol, magnesium, fluids.     After initial evaluation and testing, labs reviewed and with leukocytosis, nonspecific, VBG with mild respiratory alkalosis, otherwise unremarkable. Independently reviewed imaging without acute cardiopulmonary disease.  COVID and flu B both positive, however were notably positive during her admission just over a month ago so unclear if this represents residual positive testing.  Will defer management of this to medicine at this time. Patient was treated with below with some improvement in symptoms, however he continues to have some ongoing wheezing, tachypnea. Given her overall clinical picture, I suspect the patient's  presentation is due to an acute asthma exacerbation. I believe the patient warrants admission given the degree of respiratory distress on arrival, continued symptoms despite multiple nebulizers. Discussed patient's management with medicine who agreed to admit patient under their service.    Amount and/or Complexity of Data Reviewed  Labs: ordered. Decision-making details documented in ED Course.  Radiology: ordered and independent interpretation performed.    Risk  Prescription drug management.  Decision regarding hospitalization.        ED Course as of 05/29/25 0602   Thu May 29, 2025   0540 WBC(!): 16.99  Patient seems to have somewhat chronic leukocytosis.   0555 FLU/COVID Rapid Antigen (30 min. TAT) - Preferred screening test in ED(!)  Patient was also positive for COVID and flu during admission on 4/21/2025. Unclear if residual positive. Will discuss with medicine for management.       Medications   albuterol inhalation solution 5 mg (5 mg Nebulization Given 5/29/25 0451)     And   ipratropium (ATROVENT) 0.02 % inhalation solution 0.5 mg (0.5 mg Nebulization Given 5/29/25 0451)   albuterol inhalation solution 10 mg (10 mg Nebulization Given 5/29/25 0504)   ipratropium (ATROVENT) 0.02 % inhalation solution 1 mg (1 mg Nebulization Given 5/29/25 0504)   sodium chloride 0.9 % inhalation solution 12 mL (12 mL Nebulization Given 5/29/25 0504)   sodium chloride 0.9 % bolus 1,000 mL (0 mL Intravenous Stopped 5/29/25 0553)   magnesium sulfate 2 g/50 mL IVPB (premix) 2 g (0 g Intravenous Stopped 5/29/25 0529)   methylPREDNISolone sodium succinate (Solu-MEDROL) injection 125 mg (125 mg Intravenous Given 5/29/25 0459)       ED Risk Strat Scores                    No data recorded        SBIRT 22yo+      Flowsheet Row Most Recent Value   Initial Alcohol Screen: US AUDIT-C     1. How often do you have a drink containing alcohol? 0 Filed at: 05/29/2025 0530   2. How many drinks containing alcohol do you have on a typical day  you are drinking?  0 Filed at: 05/29/2025 0530   3a. Male UNDER 65: How often do you have five or more drinks on one occasion? 0 Filed at: 05/29/2025 0530   3b. FEMALE Any Age, or MALE 65+: How often do you have 4 or more drinks on one occassion? 0 Filed at: 05/29/2025 0530   Audit-C Score 0 Filed at: 05/29/2025 0530   REYNA: How many times in the past year have you...    Used an illegal drug or used a prescription medication for non-medical reasons? Never Filed at: 05/29/2025 0530                            History of Present Illness       Chief Complaint   Patient presents with    Asthma     Pt started with asthma exacerbation around midnight. Pt states she took her inhaler with no relief. Audible wheezing during triage.        Past Medical History[1]   Past Surgical History[2]   Family History[3]   Social History[4]   E-Cigarette/Vaping    E-Cigarette Use Never User       E-Cigarette/Vaping Substances    Nicotine No     THC No     CBD No     Flavoring No     Other No     Unknown No       I have reviewed and agree with the history as documented.     Patient is a 20-year-old female with significant past medical history of asthma, presenting for evaluation of a suspected acute asthma exacerbation.  Patient reports that she has been having some mild intermittent nonproductive coughing over the last few days, however over the last 5 hours or so she has had significant worsening of her symptoms.  She describes shortness of breath, tightness in her chest, as well as wheezing, all similar to previous asthma exacerbations.  She used to DuoNebs however has had persistence of her symptoms.  She says that this feels exactly the same as previous asthma exacerbations.  She has had admissions for asthma in the past, however has also had ED visits where she has been able to be discharged home.  She states that this seems similar in the middle in terms of severity.  She has never been intubated for asthma before.  She says that  there was 1 instance where she was placed on BiPAP however this did not seem nearly as bad.  Patient is denying any fevers.  She is denying any production to her cough.  She does report some occasional posttussive emesis.  She is otherwise without acute complaint.        Review of Systems   Constitutional:  Negative for fever.   Respiratory:  Positive for cough, chest tightness, shortness of breath and wheezing.    Cardiovascular:  Negative for chest pain.           Objective       ED Triage Vitals   Temperature Pulse Blood Pressure Respirations SpO2 Patient Position - Orthostatic VS   05/29/25 0532 05/29/25 0447 05/29/25 0447 05/29/25 0447 05/29/25 0447 05/29/25 0447   97.8 °F (36.6 °C) (!) 129 131/74 (!) 30 93 % Sitting      Temp Source Heart Rate Source BP Location FiO2 (%) Pain Score    05/29/25 0532 05/29/25 0447 05/29/25 0447 -- --    Axillary Monitor Right arm        Vitals      Date and Time Temp Pulse SpO2 Resp BP Pain Score FACES Pain Rating User   05/29/25 0556 -- 122 96 % 25 115/54 -- -- TM   05/29/25 0532 97.8 °F (36.6 °C) -- -- -- -- -- -- TM   05/29/25 0504 -- -- 99 % -- -- -- -- CD   05/29/25 0447 -- 129 93 % 30 131/74 -- -- KS            Physical Exam  Vitals and nursing note reviewed.   Constitutional:       General: She is in acute distress.      Appearance: Normal appearance. She is ill-appearing.     Cardiovascular:      Rate and Rhythm: Regular rhythm. Tachycardia present.   Pulmonary:      Effort: Tachypnea and respiratory distress present. No accessory muscle usage.      Breath sounds: Wheezing present.      Comments: Able to speak in short sentences  Abdominal:      General: Abdomen is flat.      Palpations: Abdomen is soft.      Tenderness: There is no abdominal tenderness.     Musculoskeletal:      Right lower leg: No edema.      Left lower leg: No edema.     Neurological:      Mental Status: She is alert.         Results Reviewed       Procedure Component Value Units Date/Time     FLU/COVID Rapid Antigen (30 min. TAT) - Preferred screening test in ED [[id number]]  (Abnormal) Collected: 05/29/25 0506    Lab Status: Final result Specimen: Nares from Nose Updated: 05/29/25 0543     SARS COV Rapid Antigen Positive     Influenza A Rapid Antigen Negative     Influenza B Rapid Antigen Positive    Narrative:      This test has been performed using the Quidel Heidi 2 FLU+SARS Antigen test under the Emergency Use Authorization (EUA). This test has been validated by the  and verified by the performing laboratory. The Heidi uses lateral flow immunofluorescent sandwich assay to detect SARS-COV, Influenza A and Influenza B Antigen.     The Quidel Heidi 2 SARS Antigen test does not differentiate between SARS-CoV and SARS-CoV-2.     Negative results are presumptive and may be confirmed with a molecular assay, if necessary, for patient management. Negative results do not rule out SARS-CoV-2 or influenza infection and should not be used as the sole basis for treatment or patient management decisions. A negative test result may occur if the level of antigen in a sample is below the limit of detection of this test.     Positive results are indicative of the presence of viral antigens, but do not rule out bacterial infection or co-infection with other viruses.     All test results should be used as an adjunct to clinical observations and other information available to the provider.    FOR PEDIATRIC PATIENTS - copy/paste COVID Guidelines URL to browser: [email or web address]    Basic metabolic panel [[id number]] Collected: 05/29/25 0507    Lab Status: Final result Specimen: Blood from Hand, Left Updated: 05/29/25 0554     Sodium 137 mmol/L      Potassium 4.3 mmol/L      Chloride 108 mmol/L      CO2 21 mmol/L      ANION GAP 8 mmol/L      BUN 13 mg/dL      Creatinine 0.66 mg/dL      Glucose 105 mg/dL      Calcium 9.3 mg/dL      eGFR 126 ml/min/1.73sq m      Narrative:      National Kidney Disease Foundation guidelines for Chronic Kidney Disease (CKD):     Stage 1 with normal or high GFR (GFR > 90 mL/min/1.73 square meters)    Stage 2 Mild CKD (GFR = 60-89 mL/min/1.73 square meters)    Stage 3A Moderate CKD (GFR = 45-59 mL/min/1.73 square meters)    Stage 3B Moderate CKD (GFR = 30-44 mL/min/1.73 square meters)    Stage 4 Severe CKD (GFR = 15-29 mL/min/1.73 square meters)    Stage 5 End Stage CKD (GFR <15 mL/min/1.73 square meters)  Note: GFR calculation is accurate only with a steady state creatinine    CBC and differential [[id number]]  (Abnormal) Collected: 05/29/25 0507    Lab Status: Final result Specimen: Blood from Hand, Left Updated: 05/29/25 0522     WBC 16.99 Thousand/uL      RBC 4.34 Million/uL      Hemoglobin 11.5 g/dL      Hematocrit 36.9 %      MCV 85 fL      MCH 26.5 pg      MCHC 31.2 g/dL      RDW 15.1 %      MPV 10.9 fL      Platelets 445 Thousands/uL      nRBC 0 /100 WBCs      Segmented % 75 %      Immature Grans % 1 %      Lymphocytes % 17 %      Monocytes % 7 %      Eosinophils Relative 0 %      Basophils Relative 0 %      Absolute Neutrophils 12.68 Thousands/µL      Absolute Immature Grans 0.13 Thousand/uL      Absolute Lymphocytes 2.95 Thousands/µL      Absolute Monocytes 1.10 Thousand/µL      Eosinophils Absolute 0.07 Thousand/µL      Basophils Absolute 0.06 Thousands/µL     Blood gas, venous [[id number]]  (Abnormal) Collected: 05/29/25 0511    Lab Status: Final result Specimen: Blood from Arm, Right Updated: 05/29/25 0521     pH, Wilner 7.422     pCO2, Wilner 31.4 mm Hg      pO2, Wilner 60.6 mm Hg      HCO3, Wilner 20.0 mmol/L      Base Excess, Wilner -3.6 mmol/L      O2 Content, Wilner 14.8 ml/dL      O2 HGB, VENOUS 90.3 %             X-ray chest 1 view portable   ED Interpretation by Tone Stout DO (05/29 0530)   No acute cardiopulmonary disease          Procedures    Critical Care Time Statement: Upon my evaluation, this patient had a high probability  of imminent or life-threatening deterioration due to acute respiratory distress secondary to acute asthma exacerbation requiring multiple nebulizations, which required my direct attention, intervention, and personal management.  I spent a total of 34 minutes directly providing critical care services, including interpretation of complex medical databases, evaluating for the presence of life-threatening injuries or illnesses, management of organ system failure(s) , complex medical decision making (to support/prevent further life-threatening deterioration)., and interpretation of hemodynamic data. This time is exclusive of procedures, teaching, treating other patients, family meetings, and any prior time recorded by providers other than myself.       ED Medication and Procedure Management   Prior to Admission Medications   Prescriptions Last Dose Informant Patient Reported? Taking?   albuterol (2.5 mg/3 mL) 0.083 % nebulizer solution   No No   Sig: Take 3 mL (2.5 mg total) by nebulization every 6 (six) hours as needed for wheezing or shortness of breath   albuterol (PROVENTIL HFA,VENTOLIN HFA) 90 mcg/act inhaler   No No   Sig: Inhale 2 puffs 4 (four) times a day   fluticasone (FLONASE) 50 mcg/act nasal spray   Yes No   Si spray into each nostril daily   fluticasone-umeclidinium-vilanterol (Trelegy Ellipta) 200-62.5-25 mcg/actuation AEPB inhaler   No No   Sig: Inhale 1 puff daily Rinse mouth after use.   hydrOXYzine HCL (ATARAX) 25 mg tablet   No No   Sig: Take 1 tablet (25 mg total) by mouth every 6 (six) hours as needed for anxiety   ipratropium-albuterol (DUO-NEB) 0.5-2.5 mg/3 mL nebulizer solution   No No   Sig: Take 3 mL by nebulization 4 (four) times a day   montelukast (SINGULAIR) 10 mg tablet   No No   Sig: Take 1 tablet (10 mg total) by mouth daily at bedtime   montelukast (SINGULAIR) 10 mg tablet   No No   Sig: Take 1 tablet (10 mg total) by mouth daily at bedtime      Facility-Administered Medications:  None     Patient's Medications   Discharge Prescriptions    No medications on file     No discharge procedures on file.  ED SEPSIS DOCUMENTATION   Time reflects when diagnosis was documented in both MDM as applicable and the Disposition within this note       Time User Action Codes Description Comment    5/29/2025  5:56 AM Tone Stout [J45.901] Asthma with acute exacerbation                      [1]   Past Medical History:  Diagnosis Date    Asthma     Leukocytosis 6/21/2022    SIRS (systemic inflammatory response syndrome) (HCC) 12/16/2023   [2] No past surgical history on file.  [3]   Family History  Problem Relation Name Age of Onset    Thyroid disease Mother      No Known Problems Father     [4]   Social History  Tobacco Use    Smoking status: Never    Smokeless tobacco: Never   Vaping Use    Vaping status: Never Used   Substance Use Topics    Alcohol use: Not Currently    Drug use: Not Currently        Tone Stout DO  05/29/25 0603

## 2025-05-29 NOTE — ASSESSMENT & PLAN NOTE
Frequent ED visits and admissions for asthma exacerbation  Xopenex and Atrovent 3 times daily, Pulmicort twice daily, IV Solu-Medrol 40 mg every 6 hours  Loratidine daily.  Singulair at nighttime

## 2025-05-29 NOTE — ASSESSMENT & PLAN NOTE
SIRS POA with leukocytosis and tachycardia  Tachycardia likely secondary to albuterol.  Leukocytosis 2/2 steroid use  Clinically low suspicion for infection  CXR negative for acute cardiopulmonary disease  COVID and flu positive.  Tested positive last month and treated with remdesivir and Tamiflu  Monitor off antibiotics/antivirals

## 2025-05-29 NOTE — H&P
"H&P - Hospitalist   Name: Lakhwinder Puckett 20 y.o. female I MRN: [id number]  Unit/Bed#: ED-14 I Date of Admission: 5/29/2025   Date of Service: 5/29/2025 I Hospital Day: 0     Assessment & Plan  Severe persistent asthma with acute exacerbation  Frequent ED visits and admissions for asthma exacerbation  Xopenex and Atrovent 3 times daily, Pulmicort twice daily, IV Solu-Medrol 40 mg every 6 hours  Loratidine daily.  Singulair at nighttime  SIRS (systemic inflammatory response syndrome) (HCC)  SIRS POA with leukocytosis and tachycardia  Tachycardia likely secondary to albuterol.  Leukocytosis 2/2 steroid use  Clinically low suspicion for infection  CXR negative for acute cardiopulmonary disease  COVID and flu positive.  Tested positive last month and treated with remdesivir and Tamiflu  Monitor off antibiotics/antivirals  Anxiety  Atarax as needed  Morbid obesity due to excess calories (HCC)  Weight loss, exercise, dietary and lifestyle modifications      VTE Pharmacologic Prophylaxis:   Low Risk (Score 0-2) - Encourage Ambulation.  Code Status: Prior FC  Discussion with family: Patient declined call to .     Anticipated Length of Stay: Patient will be admitted on an observation basis with an anticipated length of stay of less than 2 midnights secondary to asthma exacerbation.    History of Present Illness   Chief Complaint: \"My asthma\"    Lakhwinder Puckett is a 20 y.o. female with a PMH of above who presents with c/o shortness of breath, chest tightness and wheezing.  Took home inhaler with no relief, symptoms worsening tonight.  Denies fevers.    Review of Systems   Constitutional:  Negative for fever.   Respiratory:  Positive for cough, chest tightness, shortness of breath and wheezing.    Cardiovascular: Negative.    Gastrointestinal: Negative.    Genitourinary: Negative.    Musculoskeletal: Negative.    Neurological: Negative.    Psychiatric/Behavioral:  The patient is not nervous/anxious.        Historical " Information   Past Medical History[1]  Past Surgical History[2]  Social History[3]  E-Cigarette/Vaping    E-Cigarette Use Never User      E-Cigarette/Vaping Substances    Nicotine No     THC No     CBD No     Flavoring No     Other No     Unknown No      Family History[4]  Social History:  Marital Status: Single   Occupation:   Patient Pre-hospital Living Situation:   Patient Pre-hospital Level of Mobility: walks  Patient Pre-hospital Diet Restrictions:     Meds/Allergies   I have reviewed home medications with patient personally.  Prior to Admission medications    Medication Sig Start Date End Date Taking? Authorizing Provider   albuterol (2.5 mg/3 mL) 0.083 % nebulizer solution Take 3 mL (2.5 mg total) by nebulization every 6 (six) hours as needed for wheezing or shortness of breath 3/2/25   Laureano Philip DO   albuterol (PROVENTIL HFA,VENTOLIN HFA) 90 mcg/act inhaler Inhale 2 puffs 4 (four) times a day 3/2/25   Laureano Philip DO   fluticasone (FLONASE) 50 mcg/act nasal spray 1 spray into each nostril daily 6/4/22   Historical Provider, MD   fluticasone-umeclidinium-vilanterol (Trelegy Ellipta) 200-62.5-25 mcg/actuation AEPB inhaler Inhale 1 puff daily Rinse mouth after use. 3/2/25 5/31/25  Laureano Philip DO   hydrOXYzine HCL (ATARAX) 25 mg tablet Take 1 tablet (25 mg total) by mouth every 6 (six) hours as needed for anxiety 3/2/25   Laureano Philip DO   ipratropium-albuterol (DUO-NEB) 0.5-2.5 mg/3 mL nebulizer solution Take 3 mL by nebulization 4 (four) times a day 3/2/25   Laureano Philip DO   montelukast (SINGULAIR) 10 mg tablet Take 1 tablet (10 mg total) by mouth daily at bedtime 3/2/25 4/1/25  Laureano Philip DO   montelukast (SINGULAIR) 10 mg tablet Take 1 tablet (10 mg total) by mouth daily at bedtime 4/20/25   Marc Canseco MD     No Known Allergies    Objective :  Temp:  [97.8 °F (36.6 °C)] 97.8 °F (36.6 °C)  HR:  [122-129] 122  BP: (115-131)/(54-74) 115/54  Resp:  [25-30] 25  SpO2:   [93 %-99 %] 96 %  O2 Device: None (Room air)    Physical Exam  Constitutional:       General: She is not in acute distress.     Appearance: Normal appearance. She is obese. She is not ill-appearing, toxic-appearing or diaphoretic.   HENT:      Head: Normocephalic and atraumatic.      Mouth/Throat:      Mouth: Mucous membranes are moist.     Cardiovascular:      Rate and Rhythm: Regular rhythm. Tachycardia present.      Heart sounds: Normal heart sounds.   Pulmonary:      Breath sounds: Wheezing present.   Abdominal:      Palpations: Abdomen is soft.     Musculoskeletal:      Right lower leg: No edema.      Left lower leg: No edema.     Skin:     General: Skin is warm.     Neurological:      Mental Status: She is alert.     Psychiatric:         Thought Content: Thought content normal.         Judgment: Judgment normal.      Comments: Somnolent          Lines/Drains:            Lab Results: I have reviewed the following results:  Results from last 7 days   Lab Units 05/29/25  0507   WBC Thousand/uL 16.99*   HEMOGLOBIN g/dL 11.5   HEMATOCRIT % 36.9   PLATELETS Thousands/uL 445*   SEGS PCT % 75   LYMPHO PCT % 17   MONO PCT % 7   EOS PCT % 0     Results from last 7 days   Lab Units 05/29/25  0507   SODIUM mmol/L 137   POTASSIUM mmol/L 4.3   CHLORIDE mmol/L 108   CO2 mmol/L 21   BUN mg/dL 13   CREATININE mg/dL 0.66   ANION GAP mmol/L 8   CALCIUM mg/dL 9.3   GLUCOSE RANDOM mg/dL 105             Lab Results   Component Value Date    HGBA1C 5.5 08/06/2021           Imaging Results Review: I reviewed radiology reports from this admission including: chest xray.      Administrative Statements       ** Please Note: This note has been constructed using a voice recognition system. **         [1]   Past Medical History:  Diagnosis Date    Asthma     Leukocytosis 6/21/2022    SIRS (systemic inflammatory response syndrome) (HCC) 12/16/2023   [2] No past surgical history on file.  [3]   Social History  Tobacco Use    Smoking status:  Never    Smokeless tobacco: Never   Vaping Use    Vaping status: Never Used   Substance and Sexual Activity    Alcohol use: Not Currently    Drug use: Not Currently   [4]   Family History  Problem Relation Name Age of Onset    Thyroid disease Mother      No Known Problems Father

## 2025-05-30 VITALS
HEART RATE: 67 BPM | TEMPERATURE: 97.9 F | RESPIRATION RATE: 18 BRPM | WEIGHT: 221.56 LBS | OXYGEN SATURATION: 99 % | DIASTOLIC BLOOD PRESSURE: 64 MMHG | SYSTOLIC BLOOD PRESSURE: 133 MMHG | BODY MASS INDEX: 39.25 KG/M2

## 2025-05-30 LAB
ANION GAP SERPL CALCULATED.3IONS-SCNC: 5 MMOL/L (ref 4–13)
BASOPHILS # BLD AUTO: 0.04 THOUSANDS/ÂΜL (ref 0–0.1)
BASOPHILS NFR BLD AUTO: 0 % (ref 0–1)
BUN SERPL-MCNC: 11 MG/DL (ref 5–25)
CALCIUM SERPL-MCNC: 8.7 MG/DL (ref 8.4–10.2)
CHLORIDE SERPL-SCNC: 110 MMOL/L (ref 96–108)
CO2 SERPL-SCNC: 23 MMOL/L (ref 21–32)
CREAT SERPL-MCNC: 0.63 MG/DL (ref 0.6–1.3)
EOSINOPHIL # BLD AUTO: 0 THOUSAND/ÂΜL (ref 0–0.61)
EOSINOPHIL NFR BLD AUTO: 0 % (ref 0–6)
ERYTHROCYTE [DISTWIDTH] IN BLOOD BY AUTOMATED COUNT: 15.5 % (ref 11.6–15.1)
GFR SERPL CREATININE-BSD FRML MDRD: 128 ML/MIN/1.73SQ M
GLUCOSE SERPL-MCNC: 139 MG/DL (ref 65–140)
HCT VFR BLD AUTO: 35.5 % (ref 34.8–46.1)
HGB BLD-MCNC: 11 G/DL (ref 11.5–15.4)
IMM GRANULOCYTES # BLD AUTO: 0.32 THOUSAND/UL (ref 0–0.2)
IMM GRANULOCYTES NFR BLD AUTO: 1 % (ref 0–2)
LYMPHOCYTES # BLD AUTO: 2.58 THOUSANDS/ÂΜL (ref 0.6–4.47)
LYMPHOCYTES NFR BLD AUTO: 11 % (ref 14–44)
MAGNESIUM SERPL-MCNC: 2.2 MG/DL (ref 1.9–2.7)
MCH RBC QN AUTO: 27 PG (ref 26.8–34.3)
MCHC RBC AUTO-ENTMCNC: 31 G/DL (ref 31.4–37.4)
MCV RBC AUTO: 87 FL (ref 82–98)
MONOCYTES # BLD AUTO: 1.07 THOUSAND/ÂΜL (ref 0.17–1.22)
MONOCYTES NFR BLD AUTO: 5 % (ref 4–12)
NEUTROPHILS # BLD AUTO: 19.15 THOUSANDS/ÂΜL (ref 1.85–7.62)
NEUTS SEG NFR BLD AUTO: 83 % (ref 43–75)
NRBC BLD AUTO-RTO: 0 /100 WBCS
PLATELET # BLD AUTO: 435 THOUSANDS/UL (ref 149–390)
PMV BLD AUTO: 10.8 FL (ref 8.9–12.7)
POTASSIUM SERPL-SCNC: 4.3 MMOL/L (ref 3.5–5.3)
RBC # BLD AUTO: 4.08 MILLION/UL (ref 3.81–5.12)
SODIUM SERPL-SCNC: 138 MMOL/L (ref 135–147)
WBC # BLD AUTO: 23.16 THOUSAND/UL (ref 4.31–10.16)

## 2025-05-30 PROCEDURE — 94640 AIRWAY INHALATION TREATMENT: CPT

## 2025-05-30 PROCEDURE — 94760 N-INVAS EAR/PLS OXIMETRY 1: CPT

## 2025-05-30 PROCEDURE — 83735 ASSAY OF MAGNESIUM: CPT | Performed by: HOSPITALIST

## 2025-05-30 PROCEDURE — 85025 COMPLETE CBC W/AUTO DIFF WBC: CPT | Performed by: NURSE PRACTITIONER

## 2025-05-30 PROCEDURE — 80048 BASIC METABOLIC PNL TOTAL CA: CPT | Performed by: NURSE PRACTITIONER

## 2025-05-30 RX ORDER — PREDNISONE 10 MG/1
TABLET ORAL
Qty: 42 TABLET | Refills: 0 | Status: SHIPPED | OUTPATIENT
Start: 2025-05-30 | End: 2025-06-14

## 2025-05-30 RX ADMIN — IPRATROPIUM BROMIDE 0.5 MG: 0.5 SOLUTION RESPIRATORY (INHALATION) at 13:17

## 2025-05-30 RX ADMIN — LORATADINE 10 MG: 10 TABLET ORAL at 09:46

## 2025-05-30 RX ADMIN — LEVALBUTEROL HYDROCHLORIDE 1.25 MG: 1.25 SOLUTION RESPIRATORY (INHALATION) at 07:43

## 2025-05-30 RX ADMIN — METHYLPREDNISOLONE SODIUM SUCCINATE 40 MG: 40 INJECTION, POWDER, FOR SOLUTION INTRAMUSCULAR; INTRAVENOUS at 17:30

## 2025-05-30 RX ADMIN — METHYLPREDNISOLONE SODIUM SUCCINATE 40 MG: 40 INJECTION, POWDER, FOR SOLUTION INTRAMUSCULAR; INTRAVENOUS at 12:06

## 2025-05-30 RX ADMIN — METHYLPREDNISOLONE SODIUM SUCCINATE 40 MG: 40 INJECTION, POWDER, FOR SOLUTION INTRAMUSCULAR; INTRAVENOUS at 00:23

## 2025-05-30 RX ADMIN — BUDESONIDE 0.5 MG: 0.5 INHALANT ORAL at 07:43

## 2025-05-30 RX ADMIN — LEVALBUTEROL HYDROCHLORIDE 1.25 MG: 1.25 SOLUTION RESPIRATORY (INHALATION) at 13:17

## 2025-05-30 RX ADMIN — METHYLPREDNISOLONE SODIUM SUCCINATE 40 MG: 40 INJECTION, POWDER, FOR SOLUTION INTRAMUSCULAR; INTRAVENOUS at 05:38

## 2025-05-30 RX ADMIN — IPRATROPIUM BROMIDE 0.5 MG: 0.5 SOLUTION RESPIRATORY (INHALATION) at 07:43

## 2025-05-30 NOTE — UTILIZATION REVIEW
Initial Clinical Review    Admission: Date/Time/Statement:   Admission Orders (From admission, onward)       Ordered        05/29/25 1801  INPATIENT ADMISSION  Once            05/29/25 0556  Place in Observation  Once                          Orders Placed This Encounter   Procedures    Place in Observation     Standing Status:   Standing     Number of Occurrences:   1     Level of Care:   Med Surg [16]    INPATIENT ADMISSION     Standing Status:   Standing     Number of Occurrences:   1     Level of Care:   Med Surg [16]     Estimated length of stay:   More than 2 Midnights     Certification:   I certify that inpatient services are medically necessary for this patient for a duration of greater than two midnights. See H&P and MD Progress Notes for additional information about the patient's course of treatment.     ED Arrival Information       Expected   -    Arrival   5/29/2025 04:42    Acuity   Emergent              Means of arrival   Walk-In    Escorted by   Family Member    Service   Hospitalist    Admission type   Emergency              Arrival complaint   Asthma             Chief Complaint   Patient presents with    Asthma     Pt started with asthma exacerbation around midnight. Pt states she took her inhaler with no relief. Audible wheezing during triage.        Initial Presentation: 20 y.o. female presents to ED from home with shortness of breath, chest tightness and wheezing.  Used home  inhaler w/o relief.  Symptoms worsened the evening of admission.   PMH  is asthma,  frequent  ED visits  and admissions, anxiety.  Met SIRS  criteria with tachycardia and leukocytosis, low suspicion for infection. CXR  NAD.  + COVID   + FLU, treated with remdesivir and tamiflu last month.  Admit  Initially  Observation   and  changed to IP admission with Acute  exacerbation severe persistent asthma,  SIRS  and plan is   hold antibiotics for now, IV  s/medrol, nebulizers and pulmicort.              ED Treatment-Medication  Administration from 05/29/2025 0442 to 05/29/2025 0620         Date/Time Order Dose Route Action     05/29/2025 0451 albuterol inhalation solution 5 mg 5 mg Nebulization Given     05/29/2025 0451 ipratropium (ATROVENT) 0.02 % inhalation solution 0.5 mg 0.5 mg Nebulization Given     05/29/2025 0504 albuterol inhalation solution 10 mg 10 mg Nebulization Given     05/29/2025 0504 ipratropium (ATROVENT) 0.02 % inhalation solution 1 mg 1 mg Nebulization Given     05/29/2025 0504 sodium chloride 0.9 % inhalation solution 12 mL 12 mL Nebulization Given     05/29/2025 0501 sodium chloride 0.9 % bolus 1,000 mL 1,000 mL Intravenous New Bag     05/29/2025 0502 magnesium sulfate 2 g/50 mL IVPB (premix) 2 g 2 g Intravenous New Bag     05/29/2025 0459 methylPREDNISolone sodium succinate (Solu-MEDROL) injection 125 mg 125 mg Intravenous Given            Scheduled Medications:  budesonide, 0.5 mg, Nebulization, Q12H  ipratropium, 0.5 mg, Nebulization, TID  levalbuterol, 1.25 mg, Nebulization, TID  loratadine, 10 mg, Oral, Daily  methylPREDNISolone sodium succinate, 40 mg, Intravenous, Q6H THOMAS  montelukast, 10 mg, Oral, HS      Continuous IV Infusions:     PRN Meds:  acetaminophen, 975 mg, Oral, Q6H PRN  aluminum-magnesium hydroxide-simethicone, 30 mL, Oral, Q6H PRN  hydrOXYzine HCL, 25 mg, Oral, Q6H PRN  ondansetron, 4 mg, Intravenous, Q6H PRN  polyethylene glycol, 17 g, Oral, Daily PRN      ED Triage Vitals   Temperature Pulse Respirations Blood Pressure SpO2 Pain Score   05/29/25 0532 05/29/25 0447 05/29/25 0447 05/29/25 0447 05/29/25 0447 05/29/25 0629   97.8 °F (36.6 °C) (!) 129 (!) 30 131/74 93 % No Pain     Weight (last 2 days)       Date/Time Weight    05/29/25 0447 101 (221.56)            Vital Signs (last 3 days)       Date/Time Temp Pulse Resp BP MAP (mmHg) SpO2 O2 Device Patient Position - Orthostatic VS Pain    05/30/25 0745 97.9 °F (36.6 °C) 81 19 101/70 82 100 % Aerosol mask Sitting --    05/30/25 0743 -- -- -- -- --  96 % -- -- --    05/30/25 0037 -- -- -- -- -- -- -- -- No Pain    05/29/25 2345 98 °F (36.7 °C) 94 18 120/76 83 97 % None (Room air) Lying --    05/29/25 2100 -- -- -- -- -- -- -- -- No Pain    05/29/25 1542 98.1 °F (36.7 °C) 94 18 126/74 84 96 % None (Room air) Lying --    05/29/25 0830 -- -- -- -- -- -- -- -- No Pain    05/29/25 0712 98.8 °F (37.1 °C) 110 18 142/78 95 96 % None (Room air) Lying --    05/29/25 0629 -- -- -- -- -- -- -- -- No Pain    05/29/25 0556 -- 122 25 115/54 -- 96 % -- Sitting --    05/29/25 0532 97.8 °F (36.6 °C) -- -- -- -- -- -- -- --    05/29/25 0504 -- -- -- -- -- 99 % None (Room air) -- --    05/29/25 0447 -- 129 30 131/74 -- 93 % None (Room air) Sitting --              Pertinent Labs/Diagnostic Test Results:   Radiology:  X-ray chest 1 view portable   ED Interpretation by Tone Stout DO (05/29 0530)   No acute cardiopulmonary disease      Final Interpretation by Kevan Morales MD (05/29 0950)      No acute cardiopulmonary disease.            Workstation performed: [id number]           Cardiology:    GI:        Results from last 7 days   Lab Units 05/30/25  0553 05/29/25  0507   WBC Thousand/uL 23.16* 16.99*   HEMOGLOBIN g/dL 11.0* 11.5   HEMATOCRIT % 35.5 36.9   PLATELETS Thousands/uL 435* 445*   TOTAL NEUT ABS Thousands/µL 19.15* 12.68*         Results from last 7 days   Lab Units 05/30/25  0553 05/29/25  0507   SODIUM mmol/L 138 137   POTASSIUM mmol/L 4.3 4.3   CHLORIDE mmol/L 110* 108   CO2 mmol/L 23 21   ANION GAP mmol/L 5 8   BUN mg/dL 11 13   CREATININE mg/dL 0.63 0.66   EGFR ml/min/1.73sq m 128 126   CALCIUM mg/dL 8.7 9.3   MAGNESIUM mg/dL 2.2  --              Results from last 7 days   Lab Units 05/30/25  0553 05/29/25  0507   GLUCOSE RANDOM mg/dL 139 105              Results from last 7 days   Lab Units 05/29/25  0511   PH ADRIENNE  7.422*   PCO2 ADRIENNE mm Hg 31.4*   PO2 ADRIENNE mm Hg 60.6*   HCO3 ADRIENNE mmol/L 20.0*   BASE EXC ADRIENNE mmol/L -3.6   O2 CONTENT ADRIENNE ml/dL 14.8   O2 HGB,  VENOUS % 90.3*             Past Medical History[1]  Present on Admission:   Morbid obesity due to excess calories (HCC)   Anxiety   Severe persistent asthma with acute exacerbation   SIRS (systemic inflammatory response syndrome) ([facility])      Admitting Diagnosis: Asthma [J45.909]  Asthma with acute exacerbation [J45.901]  Age/Sex: 20 y.o. female    Network Utilization Review Department  ATTENTION: Please call with any questions or concerns to [phone] and carefully listen to the prompts so that you are directed to the right person. All voicemails are confidential.   For Discharge needs, contact Care Management DC Support Team at [phone] opt. 2  Send all requests for admission clinical reviews, approved or denied determinations and any other requests to dedicated fax number below belonging to the campus where the patient is receiving treatment. List of dedicated fax numbers for the Facilities:  FACILITY NAME UR FAX NUMBER   ADMISSION DENIALS (Administrative/Medical Necessity) [phone]   DISCHARGE SUPPORT TEAM (NETWORK) [phone]   PARENT CHILD HEALTH (Maternity/NICU/Pediatrics) [phone]   [facility] [phone]   [facility] [phone]   [facility] [phone]   [facility] [phone]   [facility] [phone]   [facility] [phone]   [facility] [phone]   [facility] [phone]   [facility] [phone]   [facility] [phone]   [facility] [phone]   [facility] [phone]             [1]   Past Medical History:  Diagnosis Date    Asthma     Leukocytosis 6/21/2022    SIRS (systemic inflammatory response syndrome) ([facility]) 12/16/2023

## 2025-05-30 NOTE — PLAN OF CARE
Problem: RESPIRATORY - ADULT  Goal: Achieves optimal ventilation and oxygenation  Description: INTERVENTIONS:  - Assess for changes in respiratory status  - Assess for changes in mentation and behavior  - Position to facilitate oxygenation and minimize respiratory effort  - Oxygen administered by appropriate delivery if ordered  - Initiate smoking cessation education as indicated  - Encourage broncho-pulmonary hygiene including cough, deep breathe, Incentive Spirometry  - Assess the need for suctioning and aspirate as needed  - Assess and instruct to report SOB or any respiratory difficulty  - Respiratory Therapy support as indicated  Outcome: Progressing     Problem: PAIN - ADULT  Goal: Verbalizes/displays adequate comfort level or baseline comfort level  Description: Interventions:  - Encourage patient to monitor pain and request assistance  - Assess pain using appropriate pain scale  - Administer analgesics as ordered based on type and severity of pain and evaluate response  - Implement non-pharmacological measures as appropriate and evaluate response  - Consider cultural and social influences on pain and pain management  - Notify physician/advanced practitioner if interventions unsuccessful or patient reports new pain  - Educate patient/family on pain management process including their role and importance of  reporting pain   - Provide non-pharmacologic/complimentary pain relief interventions  Outcome: Progressing     Problem: INFECTION - ADULT  Goal: Absence or prevention of progression during hospitalization  Description: INTERVENTIONS:  - Assess and monitor for signs and symptoms of infection  - Monitor lab/diagnostic results  - Monitor all insertion sites, i.e. indwelling lines, tubes, and drains  - Monitor endotracheal if appropriate and nasal secretions for changes in amount and color  - [place] appropriate cooling/warming therapies per order  - Administer medications as ordered  - Instruct and  encourage patient and family to use good hand hygiene technique  - Identify and instruct in appropriate isolation precautions for identified infection/condition  Outcome: Progressing     Problem: SAFETY ADULT  Goal: Patient will remain free of falls  Description: INTERVENTIONS:  - Educate patient/family on patient safety including physical limitations  - Instruct patient to call for assistance with activity   - Consider consulting OT/PT to assist with strengthening/mobility based on AM PAC & JH-HLM score  - Consult OT/PT to assist with strengthening/mobility   - Keep Call bell within reach  - Keep bed low and locked with side rails adjusted as appropriate  - Keep care items and personal belongings within reach  - Initiate and maintain comfort rounds  - Make Fall Risk Sign visible to staff  - Offer Toileting every 2 Hours, in advance of need  - Obtain necessary fall risk management equipment:  socks  - Apply yellow socks and bracelet for high fall risk patients  - Consider moving patient to room near nurses station  Outcome: Progressing     Problem: DISCHARGE PLANNING  Goal: Discharge to home or other facility with appropriate resources  Description: INTERVENTIONS:  - Identify barriers to discharge w/patient and caregiver  - Arrange for needed discharge resources and transportation as appropriate  - Identify discharge learning needs (meds, wound care, etc.)  - Arrange for interpretive services to assist at discharge as needed  - Refer to Case Management Department for coordinating discharge planning if the patient needs post-hospital services based on physician/advanced practitioner order or complex needs related to functional status, cognitive ability, or social support system  Outcome: Progressing     Problem: Knowledge Deficit  Goal: Patient/family/caregiver demonstrates understanding of disease process, treatment plan, medications, and discharge instructions  Description: Complete learning assessment and  assess knowledge base.  Interventions:  - Provide teaching at level of understanding  - Provide teaching via preferred learning methods  Outcome: Progressing

## 2025-05-30 NOTE — PLAN OF CARE
Problem: RESPIRATORY - ADULT  Goal: Achieves optimal ventilation and oxygenation  Description: INTERVENTIONS:  - Assess for changes in respiratory status  - Assess for changes in mentation and behavior  - Position to facilitate oxygenation and minimize respiratory effort  - Oxygen administered by appropriate delivery if ordered  - Initiate smoking cessation education as indicated  - Encourage broncho-pulmonary hygiene including cough, deep breathe, Incentive Spirometry  - Assess the need for suctioning and aspirate as needed  - Assess and instruct to report SOB or any respiratory difficulty  - Respiratory Therapy support as indicated  Outcome: Progressing     Problem: PAIN - ADULT  Goal: Verbalizes/displays adequate comfort level or baseline comfort level  Description: Interventions:  - Encourage patient to monitor pain and request assistance  - Assess pain using appropriate pain scale  - Administer analgesics as ordered based on type and severity of pain and evaluate response  - Implement non-pharmacological measures as appropriate and evaluate response  - Consider cultural and social influences on pain and pain management  - Notify physician/advanced practitioner if interventions unsuccessful or patient reports new pain  - Educate patient/family on pain management process including their role and importance of  reporting pain   - Provide non-pharmacologic/complimentary pain relief interventions  Outcome: Progressing     Problem: INFECTION - ADULT  Goal: Absence or prevention of progression during hospitalization  Description: INTERVENTIONS:  - Assess and monitor for signs and symptoms of infection  - Monitor lab/diagnostic results  - Monitor all insertion sites, i.e. indwelling lines, tubes, and drains  - Monitor endotracheal if appropriate and nasal secretions for changes in amount and color  - [place] appropriate cooling/warming therapies per order  - Administer medications as ordered  - Instruct and  encourage patient and family to use good hand hygiene technique  - Identify and instruct in appropriate isolation precautions for identified infection/condition  Outcome: Progressing     Problem: SAFETY ADULT  Goal: Patient will remain free of falls  Description: INTERVENTIONS:  - Educate patient/family on patient safety including physical limitations  - Instruct patient to call for assistance with activity   - Consider consulting OT/PT to assist with strengthening/mobility based on AM PAC & JH-HLM score  - Consult OT/PT to assist with strengthening/mobility   - Keep Call bell within reach  - Keep bed low and locked with side rails adjusted as appropriate  - Keep care items and personal belongings within reach  - Initiate and maintain comfort rounds  - Make Fall Risk Sign visible to staff  - Offer Toileting every 2 Hours, in advance of need  - Obtain necessary fall risk management equipment:  socks  - Apply yellow socks and bracelet for high fall risk patients  - Consider moving patient to room near nurses station  Outcome: Progressing     Problem: DISCHARGE PLANNING  Goal: Discharge to home or other facility with appropriate resources  Description: INTERVENTIONS:  - Identify barriers to discharge w/patient and caregiver  - Arrange for needed discharge resources and transportation as appropriate  - Identify discharge learning needs (meds, wound care, etc.)  - Arrange for interpretive services to assist at discharge as needed  - Refer to Case Management Department for coordinating discharge planning if the patient needs post-hospital services based on physician/advanced practitioner order or complex needs related to functional status, cognitive ability, or social support system  Outcome: Progressing     Problem: Knowledge Deficit  Goal: Patient/family/caregiver demonstrates understanding of disease process, treatment plan, medications, and discharge instructions  Description: Complete learning assessment and  assess knowledge base.  Interventions:  - Provide teaching at level of understanding  - Provide teaching via preferred learning methods  Outcome: Progressing

## 2025-05-30 NOTE — CASE MANAGEMENT
Case Management Assessment & Discharge Planning Note    Patient name Lakhwinder Puckett  Location [facility] /E2 -* MRN [id number]  : 2005 Date 2025       Current Admission Date: 2025  Current Admission Diagnosis:Severe persistent asthma with acute exacerbation   Patient Active Problem List    Diagnosis Date Noted    COVID-19 2025    Insurance coverage problems 2025    Peripheral eosinophilia 2024    Environmental allergies 2024    Eosinophilic asthma 2023    Severe persistent asthma with acute exacerbation 2023    SIRS (systemic inflammatory response syndrome) (HCC) 2023    Morbid obesity due to excess calories (HCC) 2023    Syncope 2022    Anxiety 05/10/2022      LOS (days): 1  Geometric Mean LOS (GMLOS) (days):   Days to GMLOS:     OBJECTIVE:    Risk of Unplanned Readmission Score: 25.92         Current admission status: Inpatient     Preferred Pharmacy:   [id number] [place] - [address]  [address]  [facility] [address]  Phone: [phone] Fax: [phone]     [facility] [place] - [address] [place] [address]  Phone: [phone] Fax: [phone]    [facility] [place] - [address]  [facility],  [address]  [facility],  First Floor [facility] [address]  Phone: [phone] Fax: [phone]    Primary Care Provider: Elias Schoen, MD    [place]  Secondary Insurance:     ASSESSMENT:  Active Health Care Proxies       Rene Willian Health Care Representative - Mother   Primary Phone: [phone] (Mobile)                 Advance Directives  Does patient have a Health Care POA?: No  Was patient offered paperwork?: No  Does patient currently have a Health Care decision maker?: Yes, please see Health Care Proxy section  Does patient have Advance Directives?: No  Was patient offered paperwork?: No  Primary Contact:  Willian López (mother) [phone]         Readmission Root Cause  30 Day Readmission: No    Patient Information  Admitted from:: Home  Mental Status: Alert  During Assessment patient was accompanied by: Not accompanied during assessment  Assessment information provided by:: Patient  Primary Caregiver: Self  Support Systems: Self, Parent, Family members  County of Residence: [place]  What [facility] do you live in?: [place]  Living Arrangements: Lives w/ Family members  Is patient a ?: No    Activities of Daily Living Prior to Admission  Functional Status: Independent  Completes ADLs independently?: Yes  Ambulates independently?: Yes  Does patient use assisted devices?: No  Does patient currently own DME?: No  Does patient have a history of Outpatient Therapy (PT/OT)?: No  Does the patient have a history of Short-Term Rehab?: No  Does patient have a history of HHC?: No  Does patient currently have HHC?: No         Patient Information Continued  Income Source: Employed  Does patient have prescription coverage?: Yes (MA pending)  Can the patient afford their medications and any related supplies (such as glucometers or test strips)?: N/A  Does patient receive dialysis treatments?: No  Does patient have a history of substance abuse?: No  Does patient have a history of Mental Health Diagnosis?: No       Means of Transportation  Means of Transport to Appts:: Drives Self      DISCHARGE DETAILS:    Discharge planning discussed with:: patient - phone  Freedom of Choice: Yes  Comments - Freedom of Choice: Home with OP follow up  CM contacted family/caregiver?: No- see comments  Were Treatment Team discharge recommendations reviewed with patient/caregiver?: Yes  Did patient/caregiver verbalize understanding of patient care needs?: Yes  Were patient/caregiver advised of the risks associated with not following Treatment Team discharge recommendations?: Yes    Contacts  Patient Contacts: Willian López (mother)  [phone]  Relationship to Patient:: Family  Contact Method: In Person, Phone  Phone Number: [phone]    Requested Home Health Care         Is the patient interested in HHC at discharge?: No    DME Referral Provided  Referral made for DME?: No    Other Referral/Resources/Interventions Provided:  Financial Resources Provided: Financial Counselor, Indigent Medication    Would you like to participate in our [facility] service program?  : Yes    Treatment Team Recommendation: Home  Discharge Destination Plan:: Home  Transport at Discharge : Family          Additional Comments: CM spoke with the patient via her room phone ext. [phone]. CM introduced self and reviewed role. The patient denies having any care management needs. CM addressed the patient's PATHS related questions and referred the patient to the Financial Counselor for further assistance. Patient [facility]. CM department will continue to follow the patient for discharge planning.

## 2025-06-02 ENCOUNTER — TRANSITIONAL CARE MANAGEMENT (OUTPATIENT)
Dept: FAMILY MEDICINE CLINIC | Facility: CLINIC | Age: 20
End: 2025-06-02

## 2025-06-02 ENCOUNTER — PATIENT OUTREACH (OUTPATIENT)
Dept: CASE MANAGEMENT | Facility: OTHER | Age: 20
End: 2025-06-02

## 2025-06-12 ENCOUNTER — APPOINTMENT (EMERGENCY)
Dept: RADIOLOGY | Facility: HOSPITAL | Age: 20
End: 2025-06-12
Payer: COMMERCIAL

## 2025-06-12 ENCOUNTER — HOSPITAL ENCOUNTER (OUTPATIENT)
Facility: HOSPITAL | Age: 20
Setting detail: OBSERVATION
Discharge: HOME | End: 2025-06-14
Attending: EMERGENCY MEDICINE
Payer: COMMERCIAL

## 2025-06-12 DIAGNOSIS — J45.41 MODERATE PERSISTENT ASTHMA WITH ACUTE EXACERBATION: Primary | ICD-10-CM

## 2025-06-12 DIAGNOSIS — J45.51 SEVERE PERSISTENT ASTHMA WITH ACUTE EXACERBATION: ICD-10-CM

## 2025-06-12 PROCEDURE — 96375 TX/PRO/DX INJ NEW DRUG ADDON: CPT

## 2025-06-12 PROCEDURE — 80048 BASIC METABOLIC PNL TOTAL CA: CPT | Performed by: EMERGENCY MEDICINE

## 2025-06-12 PROCEDURE — 96365 THER/PROPH/DIAG IV INF INIT: CPT

## 2025-06-12 PROCEDURE — 96366 THER/PROPH/DIAG IV INF ADDON: CPT

## 2025-06-12 PROCEDURE — 99285 EMERGENCY DEPT VISIT HI MDM: CPT | Performed by: EMERGENCY MEDICINE

## 2025-06-12 PROCEDURE — 36415 COLL VENOUS BLD VENIPUNCTURE: CPT | Performed by: EMERGENCY MEDICINE

## 2025-06-12 PROCEDURE — 94640 AIRWAY INHALATION TREATMENT: CPT

## 2025-06-12 PROCEDURE — 71045 X-RAY EXAM CHEST 1 VIEW: CPT

## 2025-06-12 PROCEDURE — 99285 EMERGENCY DEPT VISIT HI MDM: CPT

## 2025-06-12 PROCEDURE — 85025 COMPLETE CBC W/AUTO DIFF WBC: CPT | Performed by: EMERGENCY MEDICINE

## 2025-06-12 RX ORDER — ALBUTEROL SULFATE 0.83 MG/ML
5 SOLUTION RESPIRATORY (INHALATION) ONCE
Status: COMPLETED | OUTPATIENT
Start: 2025-06-12 | End: 2025-06-12

## 2025-06-12 RX ORDER — METHYLPREDNISOLONE SODIUM SUCCINATE 125 MG/2ML
125 INJECTION, POWDER, LYOPHILIZED, FOR SOLUTION INTRAMUSCULAR; INTRAVENOUS ONCE
Status: COMPLETED | OUTPATIENT
Start: 2025-06-12 | End: 2025-06-12

## 2025-06-12 RX ORDER — MAGNESIUM SULFATE HEPTAHYDRATE 40 MG/ML
2 INJECTION, SOLUTION INTRAVENOUS ONCE
Status: COMPLETED | OUTPATIENT
Start: 2025-06-12 | End: 2025-06-12

## 2025-06-12 RX ADMIN — METHYLPREDNISOLONE SODIUM SUCCINATE 125 MG: 125 INJECTION, POWDER, FOR SOLUTION INTRAMUSCULAR; INTRAVENOUS at 21:57

## 2025-06-12 RX ADMIN — ALBUTEROL SULFATE 5 MG: 2.5 SOLUTION RESPIRATORY (INHALATION) at 21:17

## 2025-06-12 RX ADMIN — MAGNESIUM SULFATE HEPTAHYDRATE 2 G: 40 INJECTION, SOLUTION INTRAVENOUS at 21:59

## 2025-06-12 RX ADMIN — IPRATROPIUM BROMIDE 0.5 MG: 0.5 SOLUTION RESPIRATORY (INHALATION) at 21:17

## 2025-06-12 RX ADMIN — ALBUTEROL SULFATE 5 MG: 2.5 SOLUTION RESPIRATORY (INHALATION) at 23:01

## 2025-06-13 LAB
ANION GAP SERPL CALCULATED.3IONS-SCNC: 9 MMOL/L (ref 4–13)
ANION GAP SERPL CALCULATED.3IONS-SCNC: 9 MMOL/L (ref 4–13)
ANISOCYTOSIS BLD QL SMEAR: PRESENT
BASOPHILS # BLD AUTO: 0.08 THOUSANDS/ÂΜL (ref 0–0.1)
BASOPHILS # BLD MANUAL: 0.13 THOUSAND/UL (ref 0–0.1)
BASOPHILS NFR BLD AUTO: 1 % (ref 0–1)
BASOPHILS NFR MAR MANUAL: 1 % (ref 0–1)
BUN SERPL-MCNC: 8 MG/DL (ref 5–25)
BUN SERPL-MCNC: 9 MG/DL (ref 5–25)
BURR CELLS BLD QL SMEAR: PRESENT
CALCIUM SERPL-MCNC: 9.3 MG/DL (ref 8.4–10.2)
CALCIUM SERPL-MCNC: 9.5 MG/DL (ref 8.4–10.2)
CHLORIDE SERPL-SCNC: 106 MMOL/L (ref 96–108)
CHLORIDE SERPL-SCNC: 107 MMOL/L (ref 96–108)
CO2 SERPL-SCNC: 20 MMOL/L (ref 21–32)
CO2 SERPL-SCNC: 20 MMOL/L (ref 21–32)
CREAT SERPL-MCNC: 0.74 MG/DL (ref 0.6–1.3)
CREAT SERPL-MCNC: 0.78 MG/DL (ref 0.6–1.3)
EOSINOPHIL # BLD AUTO: 2 THOUSAND/ÂΜL (ref 0–0.61)
EOSINOPHIL # BLD MANUAL: 0.13 THOUSAND/UL (ref 0–0.4)
EOSINOPHIL NFR BLD AUTO: 11 % (ref 0–6)
EOSINOPHIL NFR BLD MANUAL: 1 % (ref 0–6)
ERYTHROCYTE [DISTWIDTH] IN BLOOD BY AUTOMATED COUNT: 15.8 % (ref 11.6–15.1)
ERYTHROCYTE [DISTWIDTH] IN BLOOD BY AUTOMATED COUNT: 15.9 % (ref 11.6–15.1)
FLUAV RNA RESP QL NAA+PROBE: NEGATIVE
FLUBV RNA RESP QL NAA+PROBE: NEGATIVE
GFR SERPL CREATININE-BSD FRML MDRD: 109 ML/MIN/1.73SQ M
GFR SERPL CREATININE-BSD FRML MDRD: 116 ML/MIN/1.73SQ M
GLUCOSE SERPL-MCNC: 158 MG/DL (ref 65–140)
GLUCOSE SERPL-MCNC: 86 MG/DL (ref 65–140)
HCT VFR BLD AUTO: 34.8 % (ref 34.8–46.1)
HCT VFR BLD AUTO: 36.8 % (ref 34.8–46.1)
HGB BLD-MCNC: 11.1 G/DL (ref 11.5–15.4)
HGB BLD-MCNC: 11.9 G/DL (ref 11.5–15.4)
IMM GRANULOCYTES # BLD AUTO: 0.11 THOUSAND/UL (ref 0–0.2)
IMM GRANULOCYTES NFR BLD AUTO: 1 % (ref 0–2)
LG PLATELETS BLD QL SMEAR: PRESENT
LYMPHOCYTES # BLD AUTO: 0.8 THOUSAND/UL (ref 0.6–4.47)
LYMPHOCYTES # BLD AUTO: 5.48 THOUSANDS/ÂΜL (ref 0.6–4.47)
LYMPHOCYTES # BLD AUTO: 6 % (ref 14–44)
LYMPHOCYTES NFR BLD AUTO: 31 % (ref 14–44)
MCH RBC QN AUTO: 26.7 PG (ref 26.8–34.3)
MCH RBC QN AUTO: 26.9 PG (ref 26.8–34.3)
MCHC RBC AUTO-ENTMCNC: 31.9 G/DL (ref 31.4–37.4)
MCHC RBC AUTO-ENTMCNC: 32.3 G/DL (ref 31.4–37.4)
MCV RBC AUTO: 83 FL (ref 82–98)
MCV RBC AUTO: 85 FL (ref 82–98)
MONOCYTES # BLD AUTO: 0 THOUSAND/UL (ref 0–1.22)
MONOCYTES # BLD AUTO: 1.13 THOUSAND/ÂΜL (ref 0.17–1.22)
MONOCYTES NFR BLD AUTO: 6 % (ref 4–12)
MONOCYTES NFR BLD: 0 % (ref 4–12)
NEUTROPHILS # BLD AUTO: 8.93 THOUSANDS/ÂΜL (ref 1.85–7.62)
NEUTROPHILS # BLD MANUAL: 12.3 THOUSAND/UL (ref 1.85–7.62)
NEUTS SEG NFR BLD AUTO: 50 % (ref 43–75)
NEUTS SEG NFR BLD AUTO: 92 % (ref 43–75)
NRBC BLD AUTO-RTO: 0 /100 WBCS
PLATELET # BLD AUTO: 318 THOUSANDS/UL (ref 149–390)
PLATELET # BLD AUTO: 365 THOUSANDS/UL (ref 149–390)
PLATELET BLD QL SMEAR: ADEQUATE
PMV BLD AUTO: 11 FL (ref 8.9–12.7)
PMV BLD AUTO: 11.2 FL (ref 8.9–12.7)
POTASSIUM SERPL-SCNC: 4.2 MMOL/L (ref 3.5–5.3)
POTASSIUM SERPL-SCNC: 4.3 MMOL/L (ref 3.5–5.3)
RBC # BLD AUTO: 4.12 MILLION/UL (ref 3.81–5.12)
RBC # BLD AUTO: 4.45 MILLION/UL (ref 3.81–5.12)
RBC MORPH BLD: PRESENT
RSV RNA RESP QL NAA+PROBE: NEGATIVE
SARS-COV-2 RNA RESP QL NAA+PROBE: NEGATIVE
SODIUM SERPL-SCNC: 135 MMOL/L (ref 135–147)
SODIUM SERPL-SCNC: 136 MMOL/L (ref 135–147)
WBC # BLD AUTO: 13.37 THOUSAND/UL (ref 4.31–10.16)
WBC # BLD AUTO: 17.73 THOUSAND/UL (ref 4.31–10.16)

## 2025-06-13 PROCEDURE — 80048 BASIC METABOLIC PNL TOTAL CA: CPT

## 2025-06-13 PROCEDURE — 99222 1ST HOSP IP/OBS MODERATE 55: CPT | Performed by: INTERNAL MEDICINE

## 2025-06-13 PROCEDURE — 85007 BL SMEAR W/DIFF WBC COUNT: CPT

## 2025-06-13 PROCEDURE — 99255 IP/OBS CONSLTJ NEW/EST HI 80: CPT | Performed by: INTERNAL MEDICINE

## 2025-06-13 PROCEDURE — 94760 N-INVAS EAR/PLS OXIMETRY 1: CPT

## 2025-06-13 PROCEDURE — 0241U HB NFCT DS VIR RESP RNA 4 TRGT: CPT

## 2025-06-13 PROCEDURE — 94664 DEMO&/EVAL PT USE INHALER: CPT

## 2025-06-13 PROCEDURE — 85027 COMPLETE CBC AUTOMATED: CPT

## 2025-06-13 PROCEDURE — 94640 AIRWAY INHALATION TREATMENT: CPT

## 2025-06-13 RX ORDER — ALBUTEROL SULFATE 90 UG/1
2 INHALANT RESPIRATORY (INHALATION) 4 TIMES DAILY
Status: DISCONTINUED | OUTPATIENT
Start: 2025-06-13 | End: 2025-06-13

## 2025-06-13 RX ORDER — MONTELUKAST SODIUM 10 MG/1
10 TABLET ORAL
Status: DISCONTINUED | OUTPATIENT
Start: 2025-06-13 | End: 2025-06-14 | Stop reason: HOSPADM

## 2025-06-13 RX ORDER — SODIUM CHLORIDE FOR INHALATION 0.9 %
3 VIAL, NEBULIZER (ML) INHALATION
Status: DISCONTINUED | OUTPATIENT
Start: 2025-06-13 | End: 2025-06-13

## 2025-06-13 RX ORDER — LEVALBUTEROL INHALATION SOLUTION 1.25 MG/3ML
1.25 SOLUTION RESPIRATORY (INHALATION)
Status: DISCONTINUED | OUTPATIENT
Start: 2025-06-13 | End: 2025-06-14 | Stop reason: HOSPADM

## 2025-06-13 RX ORDER — ALBUTEROL SULFATE 90 UG/1
2 INHALANT RESPIRATORY (INHALATION) EVERY 4 HOURS PRN
Status: DISCONTINUED | OUTPATIENT
Start: 2025-06-13 | End: 2025-06-14 | Stop reason: HOSPADM

## 2025-06-13 RX ORDER — ACETAMINOPHEN 325 MG/1
650 TABLET ORAL EVERY 6 HOURS PRN
Status: DISCONTINUED | OUTPATIENT
Start: 2025-06-13 | End: 2025-06-14 | Stop reason: HOSPADM

## 2025-06-13 RX ORDER — FLUTICASONE FUROATE AND VILANTEROL 200; 25 UG/1; UG/1
1 POWDER RESPIRATORY (INHALATION) DAILY
Status: DISCONTINUED | OUTPATIENT
Start: 2025-06-13 | End: 2025-06-14 | Stop reason: HOSPADM

## 2025-06-13 RX ORDER — IPRATROPIUM BROMIDE AND ALBUTEROL SULFATE 2.5; .5 MG/3ML; MG/3ML
3 SOLUTION RESPIRATORY (INHALATION) 4 TIMES DAILY PRN
Status: DISCONTINUED | OUTPATIENT
Start: 2025-06-13 | End: 2025-06-14 | Stop reason: HOSPADM

## 2025-06-13 RX ORDER — BENZONATATE 100 MG/1
100 CAPSULE ORAL 3 TIMES DAILY PRN
Status: DISCONTINUED | OUTPATIENT
Start: 2025-06-13 | End: 2025-06-14 | Stop reason: HOSPADM

## 2025-06-13 RX ORDER — METHYLPREDNISOLONE SODIUM SUCCINATE 40 MG/ML
40 INJECTION, POWDER, LYOPHILIZED, FOR SOLUTION INTRAMUSCULAR; INTRAVENOUS EVERY 8 HOURS
Status: DISCONTINUED | OUTPATIENT
Start: 2025-06-13 | End: 2025-06-14

## 2025-06-13 RX ORDER — GUAIFENESIN 600 MG/1
600 TABLET, EXTENDED RELEASE ORAL 2 TIMES DAILY
Status: DISCONTINUED | OUTPATIENT
Start: 2025-06-13 | End: 2025-06-14 | Stop reason: HOSPADM

## 2025-06-13 RX ORDER — HYDROXYZINE HYDROCHLORIDE 25 MG/1
25 TABLET, FILM COATED ORAL EVERY 6 HOURS PRN
Status: DISCONTINUED | OUTPATIENT
Start: 2025-06-13 | End: 2025-06-14 | Stop reason: HOSPADM

## 2025-06-13 RX ADMIN — FLUTICASONE FUROATE AND VILANTEROL TRIFENATATE 1 PUFF: 200; 25 POWDER RESPIRATORY (INHALATION) at 08:43

## 2025-06-13 RX ADMIN — MONTELUKAST 10 MG: 10 TABLET, FILM COATED ORAL at 02:15

## 2025-06-13 RX ADMIN — UMECLIDINIUM 1 PUFF: 62.5 AEROSOL, POWDER ORAL at 08:43

## 2025-06-13 RX ADMIN — LEVALBUTEROL HYDROCHLORIDE 1.25 MG: 1.25 SOLUTION RESPIRATORY (INHALATION) at 19:29

## 2025-06-13 RX ADMIN — GUAIFENESIN 600 MG: 600 TABLET ORAL at 17:15

## 2025-06-13 RX ADMIN — GUAIFENESIN 600 MG: 600 TABLET ORAL at 08:43

## 2025-06-13 RX ADMIN — METHYLPREDNISOLONE SODIUM SUCCINATE 40 MG: 40 INJECTION, POWDER, FOR SOLUTION INTRAMUSCULAR; INTRAVENOUS at 15:30

## 2025-06-13 RX ADMIN — IPRATROPIUM BROMIDE 0.5 MG: 0.5 SOLUTION RESPIRATORY (INHALATION) at 07:14

## 2025-06-13 RX ADMIN — METHYLPREDNISOLONE SODIUM SUCCINATE 40 MG: 40 INJECTION, POWDER, FOR SOLUTION INTRAMUSCULAR; INTRAVENOUS at 05:08

## 2025-06-13 RX ADMIN — IPRATROPIUM BROMIDE AND ALBUTEROL SULFATE 3 ML: .5; 3 SOLUTION RESPIRATORY (INHALATION) at 16:52

## 2025-06-13 RX ADMIN — METHYLPREDNISOLONE SODIUM SUCCINATE 40 MG: 40 INJECTION, POWDER, FOR SOLUTION INTRAMUSCULAR; INTRAVENOUS at 21:00

## 2025-06-13 RX ADMIN — LEVALBUTEROL HYDROCHLORIDE 1.25 MG: 1.25 SOLUTION RESPIRATORY (INHALATION) at 13:53

## 2025-06-13 RX ADMIN — LEVALBUTEROL HYDROCHLORIDE 1.25 MG: 1.25 SOLUTION RESPIRATORY (INHALATION) at 07:14

## 2025-06-13 RX ADMIN — MONTELUKAST 10 MG: 10 TABLET, FILM COATED ORAL at 21:00

## 2025-06-13 NOTE — PLAN OF CARE
Problem: PAIN - ADULT  Goal: Verbalizes/displays adequate comfort level or baseline comfort level  Description: Interventions:  - Encourage patient to monitor pain and request assistance  - Assess pain using appropriate pain scale  - Administer analgesics as ordered based on type and severity of pain and evaluate response  - Implement non-pharmacological measures as appropriate and evaluate response  - Consider cultural and social influences on pain and pain management  - Notify physician/advanced practitioner if interventions unsuccessful or patient reports new pain  - Educate patient/family on pain management process including their role and importance of  reporting pain   - Provide non-pharmacologic/complimentary pain relief interventions  Outcome: Progressing     Problem: INFECTION - ADULT  Goal: Absence or prevention of progression during hospitalization  Description: INTERVENTIONS:  - Assess and monitor for signs and symptoms of infection  - Monitor lab/diagnostic results  - Monitor all insertion sites, i.e. indwelling lines, tubes, and drains  - Monitor endotracheal if appropriate and nasal secretions for changes in amount and color  - [place] appropriate cooling/warming therapies per order  - Administer medications as ordered  - Instruct and encourage patient and family to use good hand hygiene technique  - Identify and instruct in appropriate isolation precautions for identified infection/condition  Outcome: Progressing  Goal: Absence of fever/infection during neutropenic period  Description: INTERVENTIONS:  - Monitor WBC  - Perform strict hand hygiene  - Limit to healthy visitors only  - No plants, dried, fresh or silk flowers with payne in patient room  Outcome: Progressing     Problem: SAFETY ADULT  Goal: Patient will remain free of falls  Description: INTERVENTIONS:  - Educate patient/family on patient safety including physical limitations  - Instruct patient to call for assistance with activity   -  Consider consulting OT/PT to assist with strengthening/mobility based on AM PAC & JH-HLM score  - Consult OT/PT to assist with strengthening/mobility   - Keep Call bell within reach  - Keep bed low and locked with side rails adjusted as appropriate  - Keep care items and personal belongings within reach  - Initiate and maintain comfort rounds  - Make Fall Risk Sign visible to staff  - Offer Toileting every 2 Hours, in advance of need  - Initiate/Maintain bed alarm  - Obtain necessary fall risk management equipment.  - Apply yellow socks and bracelet for high fall risk patients  - Consider moving patient to room near nurses station  Outcome: Progressing  Goal: Maintain or return to baseline ADL function  Description: INTERVENTIONS:  -  Assess patient's ability to carry out ADLs; assess patient's baseline for ADL function and identify physical deficits which impact ability to perform ADLs (bathing, care of mouth/teeth, toileting, grooming, dressing, etc.)  - Assess/evaluate cause of self-care deficits   - Assess range of motion  - Assess patient's mobility; develop plan if impaired  - Assess patient's need for assistive devices and provide as appropriate  - Encourage maximum independence but intervene and supervise when necessary  - Involve family in performance of ADLs  - Assess for home care needs following discharge   - Consider OT consult to assist with ADL evaluation and planning for discharge  - Provide patient education as appropriate  - Monitor functional capacity and physical performance, use of AM PAC & JH-HLM   - Monitor gait, balance and fatigue with ambulation    Outcome: Progressing  Goal: Maintains/Returns to pre admission functional level  Description: INTERVENTIONS:  - Perform AM-PAC 6 Click Basic Mobility/ Daily Activity assessment daily.  - Set and communicate daily mobility goal to care team and patient/family/caregiver.   - Collaborate with rehabilitation services on mobility goals if consulted  -  Perform Range of Motion 2 times a day.  - Reposition patient every 2 hours.  - Dangle patient 2 times a day  - Stand patient 2 times a day  - Ambulate patient 2 times a day  - Out of bed to chair 2 times a day   - Out of bed for meals 2 times a day  - Out of bed for toileting  - Record patient progress and toleration of activity level   Outcome: Progressing     Problem: DISCHARGE PLANNING  Goal: Discharge to home or other facility with appropriate resources  Description: INTERVENTIONS:  - Identify barriers to discharge w/patient and caregiver  - Arrange for needed discharge resources and transportation as appropriate  - Identify discharge learning needs (meds, wound care, etc.)  - Arrange for interpretive services to assist at discharge as needed  - Refer to Case Management Department for coordinating discharge planning if the patient needs post-hospital services based on physician/advanced practitioner order or complex needs related to functional status, cognitive ability, or social support system  Outcome: Progressing     Problem: Knowledge Deficit  Goal: Patient/family/caregiver demonstrates understanding of disease process, treatment plan, medications, and discharge instructions  Description: Complete learning assessment and assess knowledge base.  Interventions:  - Provide teaching at level of understanding  - Provide teaching via preferred learning methods  Outcome: Progressing

## 2025-06-13 NOTE — ASSESSMENT & PLAN NOTE
Pt fulfilling SIRS secondary to tachycardia and leukocytosis of 17.73  Pt afebrile and denies any evidence of infection  CXR without evidence of infiltrate. Viral panel negative.   Likely secondary to asthma exacerbation  Monitor off antibiotics for now

## 2025-06-13 NOTE — ASSESSMENT & PLAN NOTE
Patient presented to the ED secondary to several days of shortness of breath, wheezing, and cough  She reports that she has been taking her nebulizer and inhaler without relief and her symptoms have been keeping her up at night  She reports that she was referred to pulmonology after her last admission, however they did not have any appointments until November  CXR without acute cardiopulm abnormalities   Viral panel negative  Continue IV solumderol, scheduled nebulizers, and home inhaler regimen  Will consult pulmonology for additional recommendations as this is patient's third admission since the end of April and unable to be seen outpatient for extended period

## 2025-06-13 NOTE — H&P
"H&P - Hospitalist   Name: Lakhwinder Puckett 20 y.o. female I MRN: [id number]  Unit/Bed#: [place] [address] -02 I Date of Admission: 6/12/2025   Date of Service: 6/13/2025 I Hospital Day: 0     Assessment & Plan  Severe persistent asthma with acute exacerbation  Patient presented to the ED secondary to several days of shortness of breath, wheezing, and cough  She reports that she has been taking her nebulizer and inhaler without relief and her symptoms have been keeping her up at night  She reports that she was referred to pulmonology after her last admission, however they did not have any appointments until November  CXR without acute cardiopulm abnormalities   Viral panel negative  Continue IV solumderol, scheduled nebulizers, and home inhaler regimen  Will consult pulmonology for additional recommendations as this is patient's third admission since the end of April and unable to be seen outpatient for extended period  SIRS (systemic inflammatory response syndrome) (HCC)  Pt fulfilling SIRS secondary to tachycardia and leukocytosis of 17.73  Pt afebrile and denies any evidence of infection  CXR without evidence of infiltrate. Viral panel negative.   Likely secondary to asthma exacerbation  Monitor off antibiotics for now   Anxiety  Mood stable  Continue atarax prn      VTE Pharmacologic Prophylaxis: VTE Score: 0 Low Risk (Score 0-2) - Encourage Ambulation.  Code Status: Level 1 - Full Code   Discussion with family: Patient declined call to .     Anticipated Length of Stay: Patient will be admitted on an observation basis with an anticipated length of stay of less than 2 midnights secondary to acute asthma exacerbation.    History of Present Illness   Chief Complaint: \"My inhalers were not working, and I kept getting worse\"    Lakhwinder Puckett is a 20 y.o. female who presents with acute asthma exacerbation. Pt reports that starting a few days ago, she developed worsening SOB, cough, and wheezing. She reports that " she tried using her home inhaler regimen without relief. She states her symptoms continued to worsen to the point where she was unable to sleep secondary to her wheezing and coughing prompting her to come to the ED for evaluation. Pt denies any fever, chills, congestion, or sore throat. She states that she feels her asthma may be worsening as this is her third admission since the end of April. She states she is without insurance, but should be set up with insurance soon.     Review of Systems   Constitutional:  Negative for appetite change, chills, diaphoresis, fatigue and fever.   HENT:  Negative for congestion, rhinorrhea and sore throat.    Eyes:  Negative for photophobia and visual disturbance.   Respiratory:  Positive for cough, shortness of breath and wheezing.    Cardiovascular:  Negative for chest pain, palpitations and leg swelling.   Gastrointestinal:  Negative for abdominal distention, abdominal pain, blood in stool, constipation, diarrhea, nausea and vomiting.   Genitourinary:  Negative for dysuria and hematuria.   Musculoskeletal:  Negative for arthralgias, back pain, myalgias and neck stiffness.   Skin:  Negative for color change and rash.   Neurological:  Negative for dizziness, seizures, syncope, weakness, light-headedness and headaches.   Psychiatric/Behavioral:  Negative for agitation, behavioral problems and confusion. The patient is not nervous/anxious.    All other systems reviewed and are negative.      Historical Information   Past Medical History[1]  Past Surgical History[2]  Social History[3]  E-Cigarette/Vaping    E-Cigarette Use Never User      E-Cigarette/Vaping Substances    Nicotine No     THC No     CBD No     Flavoring No     Other No     Unknown No      Family History[4]  Social History:  Marital Status: Single   Patient Pre-hospital Living Situation: Home  Patient Pre-hospital Level of Mobility: walks  Patient Pre-hospital Diet Restrictions: none    Meds/Allergies   I have reviewed  home medications with patient personally.  Prior to Admission medications    Medication Sig Start Date End Date Taking? Authorizing Provider   albuterol (2.5 mg/3 mL) 0.083 % nebulizer solution Take 3 mL (2.5 mg total) by nebulization every 6 (six) hours as needed for wheezing or shortness of breath 3/2/25  Yes Laureano Philip, DO   albuterol (PROVENTIL HFA,VENTOLIN HFA) 90 mcg/act inhaler Inhale 2 puffs 4 (four) times a day 3/2/25  Yes Laureano Philip, DO   fluticasone (FLONASE) 50 mcg/act nasal spray 1 spray into each nostril in the morning. 6/4/22  Yes Historical Provider, MD   hydrOXYzine HCL (ATARAX) 25 mg tablet Take 1 tablet (25 mg total) by mouth every 6 (six) hours as needed for anxiety 3/2/25  Yes Laureano Philip, DO   ipratropium-albuterol (DUO-NEB) 0.5-2.5 mg/3 mL nebulizer solution Take 3 mL by nebulization 4 (four) times a day 3/2/25  Yes Laureano Philip DO   montelukast (SINGULAIR) 10 mg tablet Take 1 tablet (10 mg total) by mouth daily at bedtime 4/20/25  Yes Marc Canseco MD   fluticasone-umeclidinium-vilanterol (Trelegy Ellipta) 200-62.5-25 mcg/actuation AEPB inhaler Inhale 1 puff daily Rinse mouth after use. 3/2/25 5/31/25  Laureano Philip DO   montelukast (SINGULAIR) 10 mg tablet Take 1 tablet (10 mg total) by mouth daily at bedtime 3/2/25 4/1/25  Laureano Philip, DO   predniSONE 10 mg tablet 6 tabs daily for 2 days, then 5 tabs for 2 days then 4 tabs for 2 days then 3 tabs for 2 days then 2 tab for 2 days then 1 tab for 2 days then stop stop  Patient not taking: Reported on 6/13/2025 5/30/25   Fabricio Cm, DO     No Known Allergies    Objective :  Temp:  [97.9 °F (36.6 °C)-98 °F (36.7 °C)] 97.9 °F (36.6 °C)  HR:  [] 128  BP: (109-141)/(56-86) 125/60  Resp:  [18-22] 18  SpO2:  [96 %-98 %] 96 %  O2 Device: None (Room air)    Physical Exam  Vitals and nursing note reviewed.   Constitutional:       General: She is not in acute distress.     Appearance: She is well-developed.  She is not ill-appearing.   HENT:      Head: Normocephalic and atraumatic.      Nose: Nose normal. No congestion.      Mouth/Throat:      Mouth: Mucous membranes are moist.      Pharynx: Oropharynx is clear.     Eyes:      Conjunctiva/sclera: Conjunctivae normal.       Cardiovascular:      Rate and Rhythm: Normal rate and regular rhythm.      Heart sounds: Normal heart sounds. No murmur heard.     No friction rub. No gallop.   Pulmonary:      Effort: Pulmonary effort is normal. No respiratory distress.      Breath sounds: Wheezing (mild diffuse expiratory wheezing) present. No rhonchi or rales.      Comments: On RA  Abdominal:      General: Bowel sounds are normal. There is no distension.      Palpations: Abdomen is soft.      Tenderness: There is no abdominal tenderness.     Musculoskeletal:      Cervical back: Neck supple.      Right lower leg: No edema.      Left lower leg: No edema.     Skin:     General: Skin is warm and dry.      Capillary Refill: Capillary refill takes less than 2 seconds.     Neurological:      General: No focal deficit present.      Mental Status: She is alert and oriented to person, place, and time. Mental status is at baseline.     Psychiatric:         Mood and Affect: Mood normal.         Behavior: Behavior normal.          Lines/Drains:            Lab Results: I have reviewed the following results:  Results from last 7 days   Lab Units 06/12/25  2352   WBC Thousand/uL 17.73*   HEMOGLOBIN g/dL 11.9   HEMATOCRIT % 36.8   PLATELETS Thousands/uL 365   SEGS PCT % 50   LYMPHO PCT % 31   MONO PCT % 6   EOS PCT % 11*     Results from last 7 days   Lab Units 06/12/25  2352   SODIUM mmol/L 136   POTASSIUM mmol/L 4.2   CHLORIDE mmol/L 107   CO2 mmol/L 20*   BUN mg/dL 9   CREATININE mg/dL 0.78   ANION GAP mmol/L 9   CALCIUM mg/dL 9.3   GLUCOSE RANDOM mg/dL 86             Lab Results   Component Value Date    HGBA1C 5.5 08/06/2021           Imaging Results Review: I personally reviewed the following  image studies in PACS and associated radiology reports: chest xray. My interpretation of the radiology images/reports is: no acute cardiopulm disease.  Other Study Results Review: EKG was reviewed.     Administrative Statements   I have spent a total time of 65 minutes in caring for this patient on the day of the visit/encounter including Diagnostic results, Impressions, Counseling / Coordination of care, Documenting in the medical record, Reviewing/placing orders in the medical record (including tests, medications, and/or procedures), Obtaining or reviewing history  , and Communicating with other healthcare professionals .    ** Please Note: This note has been constructed using a voice recognition system. **         [1]   Past Medical History:  Diagnosis Date    Asthma     Leukocytosis 6/21/2022    SIRS (systemic inflammatory response syndrome) (HCC) 12/16/2023   [2] No past surgical history on file.  [3]   Social History  Tobacco Use    Smoking status: Never    Smokeless tobacco: Never   Vaping Use    Vaping status: Never Used   Substance and Sexual Activity    Alcohol use: Not Currently    Drug use: Not Currently   [4]   Family History  Problem Relation Name Age of Onset    Thyroid disease Mother      No Known Problems Father        activity/movement

## 2025-06-13 NOTE — PLAN OF CARE
Problem: PAIN - ADULT  Goal: Verbalizes/displays adequate comfort level or baseline comfort level  Description: Interventions:  - Encourage patient to monitor pain and request assistance  - Assess pain using appropriate pain scale  - Administer analgesics as ordered based on type and severity of pain and evaluate response  - Implement non-pharmacological measures as appropriate and evaluate response  - Consider cultural and social influences on pain and pain management  - Notify physician/advanced practitioner if interventions unsuccessful or patient reports new pain  - Educate patient/family on pain management process including their role and importance of  reporting pain   - Provide non-pharmacologic/complimentary pain relief interventions  Outcome: Progressing     Problem: INFECTION - ADULT  Goal: Absence or prevention of progression during hospitalization  Description: INTERVENTIONS:  - Assess and monitor for signs and symptoms of infection  - Monitor lab/diagnostic results  - Monitor all insertion sites, i.e. indwelling lines, tubes, and drains  - Monitor endotracheal if appropriate and nasal secretions for changes in amount and color  - [place] appropriate cooling/warming therapies per order  - Administer medications as ordered  - Instruct and encourage patient and family to use good hand hygiene technique  - Identify and instruct in appropriate isolation precautions for identified infection/condition  Outcome: Progressing  Goal: Absence of fever/infection during neutropenic period  Description: INTERVENTIONS:  - Monitor WBC  - Perform strict hand hygiene  - Limit to healthy visitors only  - No plants, dried, fresh or silk flowers with payne in patient room  Outcome: Progressing     Problem: SAFETY ADULT  Goal: Patient will remain free of falls  Description: INTERVENTIONS:  - Educate patient/family on patient safety including physical limitations  - Instruct patient to call for assistance with activity   -  Consider consulting OT/PT to assist with strengthening/mobility based on AM PAC & JH-HLM score  - Consult OT/PT to assist with strengthening/mobility   - Keep Call bell within reach  - Keep bed low and locked with side rails adjusted as appropriate  - Keep care items and personal belongings within reach  - Initiate and maintain comfort rounds  - Make Fall Risk Sign visible to staff  - Offer Toileting every 2 Hours, in advance of need  - Initiate/Maintain bed alarm  - Obtain necessary fall risk management equipment.  - Apply yellow socks and bracelet for high fall risk patients  - Consider moving patient to room near nurses station  Outcome: Progressing  Goal: Maintain or return to baseline ADL function  Description: INTERVENTIONS:  -  Assess patient's ability to carry out ADLs; assess patient's baseline for ADL function and identify physical deficits which impact ability to perform ADLs (bathing, care of mouth/teeth, toileting, grooming, dressing, etc.)  - Assess/evaluate cause of self-care deficits   - Assess range of motion  - Assess patient's mobility; develop plan if impaired  - Assess patient's need for assistive devices and provide as appropriate  - Encourage maximum independence but intervene and supervise when necessary  - Involve family in performance of ADLs  - Assess for home care needs following discharge   - Consider OT consult to assist with ADL evaluation and planning for discharge  - Provide patient education as appropriate  - Monitor functional capacity and physical performance, use of AM PAC & JH-HLM   - Monitor gait, balance and fatigue with ambulation    Outcome: Progressing  Goal: Maintains/Returns to pre admission functional level  Description: INTERVENTIONS:  - Perform AM-PAC 6 Click Basic Mobility/ Daily Activity assessment daily.  - Set and communicate daily mobility goal to care team and patient/family/caregiver.   - Collaborate with rehabilitation services on mobility goals if consulted  -  Perform Range of Motion 2 times a day.  - Reposition patient every 2 hours.  - Dangle patient 2 times a day  - Stand patient 2 times a day  - Ambulate patient 2 times a day  - Out of bed to chair 2 times a day   - Out of bed for meals 2 times a day  - Out of bed for toileting  - Record patient progress and toleration of activity level   Outcome: Progressing     Problem: DISCHARGE PLANNING  Goal: Discharge to home or other facility with appropriate resources  Description: INTERVENTIONS:  - Identify barriers to discharge w/patient and caregiver  - Arrange for needed discharge resources and transportation as appropriate  - Identify discharge learning needs (meds, wound care, etc.)  - Arrange for interpretive services to assist at discharge as needed  - Refer to Case Management Department for coordinating discharge planning if the patient needs post-hospital services based on physician/advanced practitioner order or complex needs related to functional status, cognitive ability, or social support system  Outcome: Progressing     Problem: Knowledge Deficit  Goal: Patient/family/caregiver demonstrates understanding of disease process, treatment plan, medications, and discharge instructions  Description: Complete learning assessment and assess knowledge base.  Interventions:  - Provide teaching at level of understanding  - Provide teaching via preferred learning methods  Outcome: Progressing

## 2025-06-13 NOTE — ASSESSMENT & PLAN NOTE
Likely flared from allergen exposure, does have numerous environmental allergies including cats on her [place] Allergy panel in 2023.   Home regimen is Trelegy with as needed DuoNebs as well as Singulair    Eosinophils 2000 this admission  PFTs from 6/2022 showing FEV1/FVC ratio 59% with significant bronchodilator response    Continue Trelegy equivalent and Singulair  Will transition to prednisone 40 mg starting tomorrow with plan for taper of 10 mg every 3 days until off    Will schedule follow-up with outpatient office to discuss starting biologic therapy

## 2025-06-13 NOTE — APP STUDENT NOTE
DAVID STUDENT  Inpatient Progress Note for TRAINING ONLY  Not Part of Legal Medical Record       H&P Exam - Lakhwinder Puckett 20 y.o. female MRN: [id number]    Unit/Bed#: [place] [address] -02 Encounter: [id number]    Assessment & Plan:  Severe persistent asthma with acute exacerbation  Patient with PMH of asthma and anxiety presented to ED yesterday with CC of cough and wheezing  CXR with no acute cardiopulmonary findings  Continue around the clock nebulizer treatments and home inhalers  Continue IV solumedrol   Pulmonology consulted    SIRS (systemic inflammatory response syndrome) (HCC)  Met SIRS criteria with tachycardia and leukocytosis  CXR and viral panel negative  Afebrile with no identifiable source of infection  Likely secondary to asthma exacerbation    Anxiety  Mood stable  Continue atarax prn    History of Present Illness   20 year old female with PMH of asthma and anxiety presented to the ED yesterday with a CC of cough and wheezing. Cough has been productive of clear sputum. Her cough had woke her up during the night and she was wheezing. Denies fever or chills. Used her inhalers and nebulizer without relief. Has been to the ED and hospitalized for exacerbations multiple times in March, April, and May. Tested positive for COVID and flu during her hospital admission in May. Completed a prednisone taper. Does not follow regularly with her pulmonologist.     Cannot identify specific triggers. Was diagnosed with asthma a few years ago after testing positive for COVID. Also is allergic to her cat. Denies smoking, alcohol, and illicit drugs.     ROS: Review of Systems   Constitutional: Negative.  Negative for chills, diaphoresis, fatigue and fever.   HENT:  Positive for congestion and postnasal drip. Negative for facial swelling, sore throat and trouble swallowing.    Eyes: Negative.    Respiratory:  Positive for cough, shortness of breath and wheezing. Negative for chest tightness.    Cardiovascular: Negative.   Negative for chest pain and palpitations.   Gastrointestinal: Negative.  Negative for nausea and vomiting.   Endocrine: Negative.    Genitourinary: Negative.    Musculoskeletal: Negative.  Negative for neck stiffness.   Skin: Negative.    Neurological: Negative.  Negative for dizziness, light-headedness and headaches.   Hematological: Negative.    Psychiatric/Behavioral: Negative.          Historical Information   Past Medical History[1]  Past Surgical History[2]  Social History   Social History     Substance and Sexual Activity   Alcohol Use Not Currently     Social History     Substance and Sexual Activity   Drug Use Not Currently     Tobacco Use History[3]  Family History: Family History[4]    Meds/Allergies   PTA meds:   Prior to Admission Medications   Prescriptions Last Dose Informant Patient Reported? Taking?   albuterol (2.5 mg/3 mL) 0.083 % nebulizer solution 2025 Morning  No Yes   Sig: Take 3 mL (2.5 mg total) by nebulization every 6 (six) hours as needed for wheezing or shortness of breath   albuterol (PROVENTIL HFA,VENTOLIN HFA) 90 mcg/act inhaler 2025 Morning  No Yes   Sig: Inhale 2 puffs 4 (four) times a day   fluticasone (FLONASE) 50 mcg/act nasal spray Past Week  Yes Yes   Si spray into each nostril in the morning.   fluticasone-umeclidinium-vilanterol (Trelegy Ellipta) 200-62.5-25 mcg/actuation AEPB inhaler   No No   Sig: Inhale 1 puff daily Rinse mouth after use.   hydrOXYzine HCL (ATARAX) 25 mg tablet Past Month  No Yes   Sig: Take 1 tablet (25 mg total) by mouth every 6 (six) hours as needed for anxiety   ipratropium-albuterol (DUO-NEB) 0.5-2.5 mg/3 mL nebulizer solution 2025 Morning  No Yes   Sig: Take 3 mL by nebulization 4 (four) times a day   montelukast (SINGULAIR) 10 mg tablet   No No   Sig: Take 1 tablet (10 mg total) by mouth daily at bedtime   montelukast (SINGULAIR) 10 mg tablet 2025  No Yes   Sig: Take 1 tablet (10 mg total) by mouth daily at bedtime  "  predniSONE 10 mg tablet Not Taking  No No   Si tabs daily for 2 days, then 5 tabs for 2 days then 4 tabs for 2 days then 3 tabs for 2 days then 2 tab for 2 days then 1 tab for 2 days then stop stop   Patient not taking: Reported on 2025      Facility-Administered Medications: None     Allergies[5]    Objective   First Vitals:   Blood Pressure: 109/71 (25)  Pulse: 64 (25)  Temperature: 98 °F (36.7 °C) (25 0000)  Temp Source: Oral (25)  Respirations: 20 (25)  Height: 5' 3\" (160 cm) (25)  Weight - Scale: 100 kg (220 lb 14.4 oz) (25)  SpO2: 96 % (25)    Current Vitals:   Blood Pressure: 121/81 (25)  Pulse: 97 (25)  Temperature: 97.9 °F (36.6 °C) (25)  Temp Source: Oral (25)  Respirations: 20 (25)  Height: 5' 3\" (160 cm) (25)  Weight - Scale: 101 kg (222 lb 3.6 oz) (25)  SpO2: 96 % (2546)      Intake/Output Summary (Last 24 hours) at 2025 0947  Last data filed at 2025 0801  Gross per 24 hour   Intake 770 ml   Output --   Net 770 ml       Invasive Devices       Peripheral Intravenous Line  Duration             Peripheral IV 25 Dorsal (posterior);Right Hand <1 day                    Physical Exam: Physical Exam  Constitutional:       General: She is not in acute distress.     Appearance: Normal appearance.   HENT:      Head: Normocephalic and atraumatic.     Eyes:      Extraocular Movements: Extraocular movements intact.      Pupils: Pupils are equal, round, and reactive to light.       Cardiovascular:      Rate and Rhythm: Normal rate and regular rhythm.      Pulses: Normal pulses.      Heart sounds: Normal heart sounds. No murmur heard.     No friction rub. No gallop.   Pulmonary:      Effort: Pulmonary effort is normal.      Breath sounds: Wheezing present.   Abdominal:      General: Abdomen is flat.      Palpations: Abdomen is " soft.      Tenderness: There is no abdominal tenderness.     Musculoskeletal:         General: Normal range of motion.      Cervical back: Normal range of motion and neck supple.     Skin:     General: Skin is warm and dry.      Findings: No rash.     Neurological:      General: No focal deficit present.      Mental Status: She is alert and oriented to person, place, and time.     Psychiatric:         Mood and Affect: Mood normal.         Behavior: Behavior normal.          Lab Results:     CBC 6/13/25  Component      Latest Ref Rng 6/13/2025   WBC      4.31 - 10.16 Thousand/uL 13.37 (H)    RBC      3.81 - 5.12 Million/uL 4.12    Hemoglobin      11.5 - 15.4 g/dL 11.1 (L)    Hematocrit      34.8 - 46.1 % 34.8    MCV      82 - 98 fL 85    MCH      26.8 - 34.3 pg 26.9    MCHC      31.4 - 37.4 g/dL 31.9    RDW      11.6 - 15.1 % 15.9 (H)    MPV      8.9 - 12.7 fL 11.0    Platelet Count      149 - 390 Thousands/uL 318       BMP 6/13/25  Component      Latest Ref Rng 6/13/2025   Sodium      135 - 147 mmol/L 135    Potassium      3.5 - 5.3 mmol/L 4.3    Chloride      96 - 108 mmol/L 106    Carbon Dioxide      21 - 32 mmol/L 20 (L)    ANION GAP      4 - 13 mmol/L 9    BUN      5 - 25 mg/dL 8    Creatinine      0.60 - 1.30 mg/dL 0.74    GLUCOSE      65 - 140 mg/dL 158 (H)    Calcium      8.4 - 10.2 mg/dL 9.5    GFR, Calculated      ml/min/1.73sq m 116       Imaging:     CXR 6/12/25  No acute cardiopulmonary disease.     EKG, Pathology, and Other Studies:     Component      Latest Ref Rng 6/13/2025   INFLU A PCR      Negative  Negative    INFLU B PCR      Negative  Negative    RSV PCR      Negative  Negative    SARS-COV-2      Negative  Negative          Code Status: Level 1 - Full Code  Advance Directive and Living Will:      Power of :    POLST:      Counseling / Coordination of Care:   {BERNADETTE Statement:45362}           [1]   Past Medical History:  Diagnosis Date    Asthma     Leukocytosis 6/21/2022    SIRS (systemic  inflammatory response syndrome) (HCC) 12/16/2023   [2] No past surgical history on file.  [3]   Social History  Tobacco Use   Smoking Status Never   Smokeless Tobacco Never   [4]   Family History  Problem Relation Name Age of Onset    Thyroid disease Mother      No Known Problems Father     [5] No Known Allergies

## 2025-06-13 NOTE — UTILIZATION REVIEW
URGENT/EMERGENT  INPATIENT/SPU AUTHORIZATION REQUEST    Date: 06/13/25            # Pages in this Request:     X New Request   Additional Information for PA#:     Office Contact Name:  Mary Randolph Title: Utilization Review, Registed Nurse     Phone: [phone]  Ext.     Availability (Date/Time): M-F 8-4    Type of Review:    Inpatient Review    Current    For Late Pick-up Cases:  How your facility was first notified of the Late Pick-up:   When your facility was first notified of the Late Pick-up (date):     Was the recipient a prisoner at the time of admission? no           RECIPIENT INFORMATION    Recipient ID#: [id number]  Recipient Name: Lakhwinder Puckett      YOB: 2005  20 y.o. Recipient Alias:     Gender:   Male x Female Medicaid Eligibility (HCB Package):          INSURANCE INFORMATION    (All other private or governmental health insurance benefits must be utilized prior to billing the [place] Program)    Was this admission the result of an MVA, other accident, assault, injury, fall, gunshot, bite etc.?  no   If yes, provide a brief description of the incident.      Does the recipient have other insurance coverage?  no  Insurance Company Name:    Policy #:  Did that insurance pay on this claim?    Yes  No     Did that insurance deny this claim?   Yes  No     If yes, reason for denial:      Does the recipient have Medicare?  no  Did Medicare exhaust prior to this admission?    Yes  No     Did Medicare partially pay this claim?   Yes  No     Did Medicare deny this claim?   Yes  No   If yes, reason for denial:      Was the recipient a prisoner at the time of admission?  no        PROVIDER INFORMATION    Hospital Name: [facility]   [facility] Provider ID#: [id number]     Admitting Physician: [facility]                          [facility] Provider ID#: [id number]        ADMISSION INFORMATION    Type of Admission: (please choose one)  ED    Admission Floor or Unit  "Type:med-surg      Dates/Times:        ED Date/Time: 5/29/2025  4:42 AM        Observation Date/Time: 5/29/2025  0556         IP Admission Date/Time: 5/29/25  6:01 PM        Discharge or Transfer Date/Time: 5/30/2025  5:41 PM        DIAGNOSIS/PROCEDURE CODES    Primary Diagnosis Code/Primary Diagnosis Code description:    J45.51 - Severe persistent asthma with (acute) exacerbation   R65.10 - Systemic inflammatory response syndrome (sirs) of non-infectious origin without acute organ dysfunction   Additional Diagnosis Code(s) and Description(s)-(up to 3 additional codes):      Procedure Code (1) and description:NONE        CLINICAL INFORMATION - PRIOR ADMISSION ONLY    Is there a prior admission with a discharge date within 30 days of the date of this admission?    X No (Proceed to the next section - \"Clinical Information - General Review Checklist\")      Yes (Provide the following information)     Prior admission dates:    MA Prior Authorization Number:        Review Outcome:     Diagnosis Code(s)/Description:    Procedure Code/Description:    Findings:    Treatment:    Condition on Discharge:   Vitals:    Labs:   Imaging:   Medications:    Follow-up Instructions:    Disposition:        CLINICAL INFORMATION - GENERAL REVIEW CHECKLIST    EMERGENCY DEPARTMENT: (Proceed to \"ADMISSION\" if Direct Admission)    Presenting Signs/Symptoms:  Chief Complaint   Patient presents with    Asthma     Pt started with asthma exacerbation around midnight. Pt states she took her inhaler with no relief. Audible wheezing during triage.    Initial Presentation: 20 y.o. female presents to ED from home with shortness of breath, chest tightness and wheezing.  Used home  inhaler w/o relief.  Symptoms worsened the evening of admission.   PMH  is asthma,  frequent  ED visits  and admissions, anxiety.  Met SIRS  criteria with tachycardia and leukocytosis, low suspicion for infection. CXR  NAD.  + COVID   + FLU, treated with remdesivir and " tamiflu last month.  Admit  Initially  Observation   and  changed to IP admission with Acute  exacerbation severe persistent asthma,  SIRS  and plan is   hold antibiotics for now, IV  s/medrol, nebulizers and pulmicort.     Medication/treatment prior to arrival in the ED:    Past Medical History:  has a past medical history of Asthma, Leukocytosis (6/21/2022), and SIRS (systemic inflammatory response syndrome) (HCC) (12/16/2023).     Clinical Exam:    Initial Vital Signs: (Temp, Pulse, Resp, and BP)   ED Triage Vitals   Temperature Pulse Respirations Blood Pressure SpO2   05/29/25 0532 05/29/25 0447 05/29/25 0447 05/29/25 0447 05/29/25 0447   97.8 °F (36.6 °C) (!) 129 (!) 30 131/74 93 %      Temp Source Heart Rate Source Patient Position - Orthostatic VS BP Location FiO2 (%)   05/29/25 0532 05/29/25 0447 05/29/25 0447 05/29/25 0447 --   Axillary Monitor Sitting Right arm       Pain Score       05/29/25 0629       No Pain           Pertinent Repeat Vital Signs: (include times they were obtained)  Date/Time Temp Pulse Resp BP MAP (mmHg) SpO2 O2 Device Patient Position - Orthostatic VS Pain      05/30/25 0745 97.9 °F (36.6 °C) 81 19 101/70 82 100 % Aerosol mask Sitting --     05/30/25 0743 -- -- -- -- -- 96 % -- -- --     05/30/25 0037 -- -- -- -- -- -- -- -- No Pain     05/29/25 2345 98 °F (36.7 °C) 94 18 120/76 83 97 % None (Room air) Lying --     05/29/25 2100 -- -- -- -- -- -- -- -- No Pain     05/29/25 1542 98.1 °F (36.7 °C) 94 18 126/74 84 96 % None (Room air) Lying --     05/29/25 0830 -- -- -- -- -- -- -- -- No Pain     05/29/25 0712 98.8 °F (37.1 °C) 110 18 142/78 95 96 % None (Room air) Lying --     05/29/25 0629 -- -- -- -- -- -- -- -- No Pain     05/29/25 0556 -- 122 25 115/54 -- 96 % -- Sitting --     05/29/25 0532 97.8 °F (36.6 °C) -- -- -- -- -- -- -- --     05/29/25 0504 -- -- -- -- -- 99 % None (Room air) -- --     05/29/25 0447 -- 129 30 131/74 -- 93 % None (Room air) Sitting --      Pertinent  Sustained Findings: (include times they were obtained)    Weight in Kilograms:       Date/Time Weight     05/29/25 0447 101 (221.56)     Pertinent Labs (results):  Results from last 7 days   Lab Units 05/30/25  0553 05/29/25  0507   WBC Thousand/uL 23.16* 16.99*   HEMOGLOBIN g/dL 11.0* 11.5   HEMATOCRIT % 35.5 36.9   PLATELETS Thousands/uL 435* 445*   TOTAL NEUT ABS Thousands/µL 19.15* 12.68*                Results from last 7 days   Lab Units 05/30/25  0553 05/29/25  0507   SODIUM mmol/L 138 137   POTASSIUM mmol/L 4.3 4.3   CHLORIDE mmol/L 110* 108   CO2 mmol/L 23 21   ANION GAP mmol/L 5 8   BUN mg/dL 11 13   CREATININE mg/dL 0.63 0.66   EGFR ml/min/1.73sq m 128 126   CALCIUM mg/dL 8.7 9.3   MAGNESIUM mg/dL 2.2  --                     Results from last 7 days   Lab Units 05/30/25  0553 05/29/25  0507   GLUCOSE RANDOM mg/dL 139 105                    Results from last 7 days   Lab Units 05/29/25  0511   PH ADRIENNE   7.422*   PCO2 ADRIENNE mm Hg 31.4*   PO2 ADRIENNE mm Hg 60.6*   HCO3 ADRIENNE mmol/L 20.0*   BASE EXC ADRIENNE mmol/L -3.6   O2 CONTENT ADRIENNE ml/dL 14.8   O2 HGB, VENOUS % 90.3*      Radiology (results):  X-ray chest 1 view portable   ED Interpretation by Tone Stout DO (05/29 0530)   No acute cardiopulmonary disease       Final Interpretation by Kevan Morales MD (05/29 0950)       No acute cardiopulmonary disease.     EKG (results):     Other tests (results):    Tests pending final results:    Treatment in the ED:   Medication Administration from 05/29/2025 0442 to 05/29/2025 0620         Date/Time Order Dose Route Action       05/29/2025 0451 EDT albuterol inhalation solution 5 mg 5 mg Nebulization Given       05/29/2025 0451 EDT ipratropium (ATROVENT) 0.02 % inhalation solution 0.5 mg 0.5 mg Nebulization Given       05/29/2025 0504 EDT albuterol inhalation solution 10 mg 10 mg Nebulization Given       05/29/2025 0504 EDT ipratropium (ATROVENT) 0.02 % inhalation solution 1 mg 1 mg Nebulization Given       05/29/2025  "0504 EDT sodium chloride 0.9 % inhalation solution 12 mL 12 mL Nebulization Given                  05/29/2025 0501 EDT sodium chloride 0.9 % bolus 1,000 mL 1,000 mL Intravenous New Bag                  05/29/2025 0502 EDT magnesium sulfate 2 g/50 mL IVPB (premix) 2 g 2 g Intravenous New Bag       05/29/2025 0459 EDT methylPREDNISolone sodium succinate (Solu-MEDROL) injection 125 mg 125 mg Intravenous Given                         OBSERVATION: (Proceed to \"ADMISSION\" if Direct Admission)    Orders written during the observation period  Meds Name, dose, route, time, how may doses given:  budesonide, 0.5 mg, Nebulization, Q12H  ipratropium, 0.5 mg, Nebulization, TID  levalbuterol, 1.25 mg, Nebulization, TID  loratadine, 10 mg, Oral, Daily  methylPREDNISolone sodium succinate, 40 mg, Intravenous, Q6H THOMAS  montelukast, 10 mg, Oral, HS        Continuous IV Infusions:  Infusions Meds - Displays dose, route, & frequency only         PRN Meds:  acetaminophen, 975 mg, Oral, Q6H PRN  aluminum-magnesium hydroxide-simethicone, 30 mL, Oral, Q6H PRN  hydrOXYzine HCL, 25 mg, Oral, Q6H PRN  ondansetron, 4 mg, Intravenous, Q6H PRN  polyethylene glycol, 17 g, Oral, Daily PRN  Labs, imaging, other:  Consults and findings:  Assessment:   Asthma exacerbations     Plan:   Solumedrol, around the clock neb tx     Subjective  Increased wheezing and SOB     O  Afebrile  VSS  Lung;  bilateral exp wheeze    Severe persistent asthma with acute exacerbation  Frequent ED visits and admissions for asthma exacerbation  Xopenex and Atrovent 3 times daily, Pulmicort twice daily, IV Solu-Medrol 40 mg every 6 hours  Loratidine daily.  Singulair at nighttime  SIRS (systemic inflammatory response syndrome) (HCC)  SIRS POA with leukocytosis and tachycardia  Tachycardia likely secondary to albuterol.  Leukocytosis 2/2 steroid use  Clinically low suspicion for infection  CXR negative for acute cardiopulmonary disease  COVID and flu positive.  Tested positive " last month and treated with remdesivir and Tamiflu  Monitor off antibiotics/antivirals  Anxiety  Atarax as needed  Morbid obesity due to excess calories (HCC)  Weight loss, exercise, dietary and lifestyle modifications        VTE Pharmacologic Prophylaxis:   Low Risk (Score 0-2) - Encourage Ambulation.  Code Status: Prior FC  Discussion with family: Patient declined call to .      Anticipated Length of Stay: Patient will be admitted on an observation basis with an anticipated length of stay of less than 2 midnights secondary to asthma exacerbation.       Test Results during the observation period  Pertinent Lab tests (dates/results):  Culture results (blood, urine, spinal, wound, respiratory, etc.):  Imaging tests (dates/results):  EKG (dates/results):  Other test (dates/results):  Tests pending (dates/results):    Surgical or Invasive Procedures during the observation period  Name of surgery/procedure:  Date & Time:  Patient Response:  Post-operative orders:  Operative Report/Findings:    Response to Treatment, Major Change in Condition, Major Charge in Treatment during the observation period          ADMISSION:    ALL Admissions:    Admission Orders and Other Orders written within the first 24 hrs after admission  Meds Name, dose, route, time, how may doses given:    budesonide, 0.5 mg, Nebulization, Q12H  ipratropium, 0.5 mg, Nebulization, TID  levalbuterol, 1.25 mg, Nebulization, TID  loratadine, 10 mg, Oral, Daily  methylPREDNISolone sodium succinate, 40 mg, Intravenous, Q6H THOMAS  montelukast, 10 mg, Oral, HS        Continuous IV Infusions:  Infusions Meds - Displays dose, route, & frequency only         PRN Meds:  acetaminophen, 975 mg, Oral, Q6H PRN  aluminum-magnesium hydroxide-simethicone, 30 mL, Oral, Q6H PRN  hydrOXYzine HCL, 25 mg, Oral, Q6H PRN  ondansetron, 4 mg, Intravenous, Q6H PRN  polyethylene glycol, 17 g, Oral, Daily PRN        Disposition on Discharge  Home, Rehab, SNF, LTC,  Shelter, etc.: Home/Self Care            Is there a Readmission that follows this admission? no  If yes, provide dates:        InterQual Review  InterQual Criteria Met: no    Please include the InterQual Review, InterQual year/version used, and the criteria selected:   InterQual® Review Status: Completed  Review Type: Admission  Criteria Status: Not Met  Day of review: Episode Day 1  Condition Specific: Yes        REVIEW DETAILS     Product: LOC:Acute Adult  Subset: Asthma           Version: InterQual® 2025, Mar. 2025 Release        PLEASE SUBMIT THE COMPLETED FORM TO THE DEPARTMENT OF HUMAN SERVICES - DIVISION OF CLINICAL  REVIEW VIA FAX [phone] or VIA E-MAIL TO [email or web address]    Signature: Mary Randolph Date:  06/13/25    Confidentiality Notice: The documents accompanying this telecopy may contain confidential information belonging to the sender.  The information is intended only for the use of the individual named above. If you are not the intended recipient, you are hereby notified  That any disclosure, copying, distribution or taking of any telecopy is strictly prohibited.

## 2025-06-13 NOTE — ED PROVIDER NOTES
Time reflects when diagnosis was documented in both MDM as applicable and the Disposition within this note       Time User Action Codes Description Comment    6/12/2025 11:43 PM Carmita Reynolds Add [J45.41] Moderate persistent asthma with acute exacerbation           ED Disposition       ED Disposition   Admit    Condition   Good    Date/Time   Thu Jun 12, 2025 11:42 PM    Comment   Case was discussed with sri and the patient's admission status was agreed to be Admission Status: observation status to the service of Dr. yang .               Assessment & Plan       Medical Decision Making  20-year-old female with history of asthma with frequent ED visits for asthma and admissions in March, April and May for asthma presents to the emergency department with asthma exacerbation that started around 7 PM.  She is also had a cough productive of clear sputum.  No fevers or chills.  She used her nebulizer and her inhalers without relief.  She was given referrals to pulmonology but has not yet made an appointment.  She denies smoking.  She finished a prednisone taper after her last admission.  On exam she is alert she is in no acute distress.  She does have audible wheezing.  No respiratory distress.  She has wheezing in all fields on exam.  DuoNeb already in progress.  Will give IV Solu-Medrol as well as IV magnesium.  Will obtain chest x-ray to rule out pneumonia.  Will reassess    Amount and/or Complexity of Data Reviewed  Labs: ordered.  Radiology: ordered and independent interpretation performed.  Discussion of management or test interpretation with external provider(s): Critical Care Time Statement: Upon my evaluation, this patient had a high probability of imminent or life-threatening deterioration due to asthma, respiratory failure, which required my direct attention, intervention, and personal management.  I spent a total of 60 minutes directly providing critical care services, including interpretation of  "complex medical databases, evaluating for the presence of life-threatening injuries or illnesses, management of organ system failure(s) , complex medical decision making (to support/prevent further life-threatening deterioration)., and interpretation of hemodynamic data. This time is exclusive of procedures, teaching, treating other patients, family meetings, and any prior time recorded by providers other than myself.       Risk  Prescription drug management.  Decision regarding hospitalization.        ED Course as of 06/12/25 2344   u Jun 12, 2025 2135 Blood Pressure: 109/71   2135 Pulse: 64   2135 Respirations: 20   2248 Patient still with diffuse wheezing.  Will give second albuterol treatment and then if no better will admit   2332 Still with significant wheezing.       Medications   magnesium sulfate 2 g/50 mL IVPB (premix) 2 g (2 g Intravenous New Bag 6/12/25 2159)   albuterol inhalation solution 5 mg (5 mg Nebulization Given 6/12/25 2117)     And   ipratropium (ATROVENT) 0.02 % inhalation solution 0.5 mg (0.5 mg Nebulization Given 6/12/25 2117)   methylPREDNISolone sodium succinate (Solu-MEDROL) injection 125 mg (125 mg Intravenous Given 6/12/25 2157)   albuterol inhalation solution 5 mg (5 mg Nebulization Given 6/12/25 2301)       ED Risk Strat Scores                    No data recorded                            History of Present Illness       Chief Complaint   Patient presents with    Asthma     Pt reports \"asthma exacerbation for a few days\" +cough + wheezing       Past Medical History[1]   Past Surgical History[2]   Family History[3]   Social History[4]   E-Cigarette/Vaping    E-Cigarette Use Never User       E-Cigarette/Vaping Substances    Nicotine No     THC No     CBD No     Flavoring No     Other No     Unknown No       I have reviewed and agree with the history as documented.     20-year-old female with history of asthma, frequent ED visits and admissions for asthma presents to the ED with " asthma exacerbation since about 7 PM.  On review of the records, patient has been admitted in March, April and May for asthma exacerbations.  No prior intubations.  During her May admission she was found to be positive for flu and COVID and treated.  She completed a prednisone taper.  She was given referral for pulmonology but has not yet made an appointment.  She states she does have a cough productive of clear sputum.  No fevers or chills.  She used her nebulizer and inhalers without relief.  She denies smoking.      History provided by:  Patient   used: No    Asthma  Onset quality:  Gradual  Duration:  3 hours  Timing:  Constant  Progression:  Unchanged  Chronicity:  Recurrent  Relieved by:  Nothing  Worsened by:  Nothing  Ineffective treatments:  Nebulizer and Trelegy and Singulair  Associated symptoms: cough, shortness of breath and wheezing    Associated symptoms: no abdominal pain, no chest pain, no congestion, no diarrhea, no ear pain, no fatigue, no fever, no headaches, no loss of consciousness, no myalgias, no nausea, no rash, no rhinorrhea, no sore throat and no vomiting    Cough:     Cough characteristics:  Productive    Sputum characteristics:  Clear    Duration:  2 hours    Timing:  Intermittent    Progression:  Unchanged    Chronicity:  New      Review of Systems   Constitutional: Negative.  Negative for chills, diaphoresis, fatigue and fever.   HENT: Negative.  Negative for congestion, ear pain, rhinorrhea and sore throat.    Eyes: Negative.  Negative for discharge, redness and itching.   Respiratory:  Positive for cough, shortness of breath and wheezing. Negative for apnea, chest tightness and stridor.    Cardiovascular:  Negative for chest pain, palpitations and leg swelling.   Gastrointestinal: Negative.  Negative for abdominal pain, diarrhea, nausea and vomiting.   Endocrine: Negative.    Genitourinary: Negative.  Negative for flank pain, frequency and urgency.    Musculoskeletal: Negative.  Negative for back pain and myalgias.   Skin: Negative.  Negative for rash.   Allergic/Immunologic: Negative.    Neurological: Negative.  Negative for dizziness, loss of consciousness, syncope, weakness, light-headedness, numbness and headaches.   Hematological: Negative.    All other systems reviewed and are negative.          Objective       ED Triage Vitals   Temp Pulse Blood Pressure Respirations SpO2 Patient Position - Orthostatic VS   -- 06/12/25 2111 06/12/25 2111 06/12/25 2111 06/12/25 2111 06/12/25 2215    64 109/71 20 96 % Lying      Temp src Heart Rate Source BP Location FiO2 (%) Pain Score    -- 06/12/25 2111 06/12/25 2215 -- --     Monitor Right arm        Vitals      Date and Time Temp Pulse SpO2 Resp BP Pain Score FACES Pain Rating User   06/12/25 2300 -- 127 98 % -- 121/56 -- -- LB   06/12/25 2215 --  154 provider made aware 96 % 22 121/86 -- -- DF   06/12/25 2111 -- 64 96 % 20 109/71 -- -- NM            Physical Exam  Vitals and nursing note reviewed.   Constitutional:       General: She is awake. She is not in acute distress.     Appearance: She is well-developed and overweight. She is not ill-appearing, toxic-appearing or diaphoretic.   HENT:      Head: Normocephalic and atraumatic.      Right Ear: External ear normal.      Left Ear: External ear normal.      Nose: Nose normal.      Mouth/Throat:      Mouth: Mucous membranes are moist.      Pharynx: No oropharyngeal exudate.     Eyes:      General: No scleral icterus.        Right eye: No discharge.         Left eye: No discharge.      Conjunctiva/sclera: Conjunctivae normal.     Neck:      Thyroid: No thyromegaly.      Vascular: No JVD.      Trachea: No tracheal deviation.     Cardiovascular:      Rate and Rhythm: Normal rate and regular rhythm.      Heart sounds: Normal heart sounds. No murmur heard.     No friction rub. No gallop.   Pulmonary:      Effort: Pulmonary effort is normal. No tachypnea, accessory muscle  usage or respiratory distress.      Breath sounds: No stridor. Examination of the right-upper field reveals wheezing. Examination of the left-upper field reveals wheezing. Examination of the right-middle field reveals wheezing. Examination of the left-middle field reveals wheezing. Examination of the right-lower field reveals wheezing. Examination of the left-lower field reveals wheezing. Wheezing present. No rhonchi or rales.   Chest:      Chest wall: No tenderness.   Abdominal:      General: Bowel sounds are normal. There is no distension.      Palpations: Abdomen is soft. There is no mass.      Tenderness: There is no abdominal tenderness.      Hernia: No hernia is present.     Musculoskeletal:         General: No swelling, tenderness, deformity or signs of injury.      Cervical back: Normal range of motion and neck supple.      Right lower leg: No edema.      Left lower leg: No edema.   Lymphadenopathy:      Cervical: No cervical adenopathy.     Skin:     General: Skin is warm and dry.      Coloration: Skin is not jaundiced or pale.      Findings: No bruising, erythema, lesion or rash.     Neurological:      General: No focal deficit present.      Mental Status: She is alert and oriented to person, place, and time.      Motor: No weakness or abnormal muscle tone.      Deep Tendon Reflexes: Reflexes are normal and symmetric.     Psychiatric:         Mood and Affect: Mood normal.         Behavior: Behavior is cooperative.         Results Reviewed       Procedure Component Value Units Date/Time    CBC and differential [[id number]]     Lab Status: No result Specimen: Blood     Basic metabolic panel [[id number]]     Lab Status: No result Specimen: Blood             XR chest 1 view portable   ED Interpretation by Carmita Reynolds DO (06/12 2242)   nad          Procedures    ED Medication and Procedure Management   Prior to Admission Medications   Prescriptions Last Dose Informant Patient Reported? Taking?    albuterol (2.5 mg/3 mL) 0.083 % nebulizer solution   No No   Sig: Take 3 mL (2.5 mg total) by nebulization every 6 (six) hours as needed for wheezing or shortness of breath   albuterol (PROVENTIL HFA,VENTOLIN HFA) 90 mcg/act inhaler   No No   Sig: Inhale 2 puffs 4 (four) times a day   fluticasone (FLONASE) 50 mcg/act nasal spray   Yes No   Si spray into each nostril in the morning.   fluticasone-umeclidinium-vilanterol (Trelegy Ellipta) 200-62.5-25 mcg/actuation AEPB inhaler   No No   Sig: Inhale 1 puff daily Rinse mouth after use.   hydrOXYzine HCL (ATARAX) 25 mg tablet   No No   Sig: Take 1 tablet (25 mg total) by mouth every 6 (six) hours as needed for anxiety   ipratropium-albuterol (DUO-NEB) 0.5-2.5 mg/3 mL nebulizer solution   No No   Sig: Take 3 mL by nebulization 4 (four) times a day   montelukast (SINGULAIR) 10 mg tablet   No No   Sig: Take 1 tablet (10 mg total) by mouth daily at bedtime   montelukast (SINGULAIR) 10 mg tablet   No No   Sig: Take 1 tablet (10 mg total) by mouth daily at bedtime   predniSONE 10 mg tablet   No No   Si tabs daily for 2 days, then 5 tabs for 2 days then 4 tabs for 2 days then 3 tabs for 2 days then 2 tab for 2 days then 1 tab for 2 days then stop stop      Facility-Administered Medications: None     Patient's Medications   Discharge Prescriptions    No medications on file     No discharge procedures on file.  ED SEPSIS DOCUMENTATION   Time reflects when diagnosis was documented in both MDM as applicable and the Disposition within this note       Time User Action Codes Description Comment    2025 11:43 PM Carmita Reynolds Add [J45.41] Moderate persistent asthma with acute exacerbation                      [1]   Past Medical History:  Diagnosis Date    Asthma     Leukocytosis 2022    SIRS (systemic inflammatory response syndrome) (HCC) 2023   [2] No past surgical history on file.  [3]   Family History  Problem Relation Name Age of Onset    Thyroid  disease Mother      No Known Problems Father     [4]   Social History  Tobacco Use    Smoking status: Never    Smokeless tobacco: Never   Vaping Use    Vaping status: Never Used   Substance Use Topics    Alcohol use: Not Currently    Drug use: Not Currently        Carmita Reynolds DO  06/12/25 2397

## 2025-06-13 NOTE — CONSULTS
Consultation - Pulmonology   Name: Lakhwinder Puckett 20 y.o. female I MRN: [id number]  Unit/Bed#: [place] [address] -02 I Date of Admission: 6/12/2025   Date of Service: 6/13/2025 I Hospital Day: 0       Inpatient consult to Pulmonology     Date/Time  6/13/2025 7:50 AM     Performed by  Robbin Page MD   Authorized by  Peter Rossi PA-C           Physician Requesting Evaluation: Imtiaz King Pe*   Reason for Evaluation / Principal Problem: severe asthma    Assessment & Plan  Severe persistent asthma with acute exacerbation  Likely flared from allergen exposure, does have numerous environmental allergies including cats on her [place] Allergy panel in 2023.   Home regimen is Trelegy with as needed DuoNebs as well as Singulair    Eosinophils 2000 this admission  PFTs from 6/2022 showing FEV1/FVC ratio 59% with significant bronchodilator response    Continue Trelegy equivalent and Singulair  Will transition to prednisone 40 mg starting tomorrow with plan for taper of 10 mg every 3 days until off    Will schedule follow-up with outpatient office to discuss starting biologic therapy  Anxiety    SIRS (systemic inflammatory response syndrome) (HCC)        History of Present Illness   Lakhwinder Puckett is a 20 y.o. female who presents with asthma exacerbation.  She has a history of asthma that seem to start after her COVID infection.  She does have multiple allergies that she says seem to start around the same time.  She is not sure of any specific trigger that caused her asthma to flare this time around.  She does note that in the past she has had difficulty with insurance which is limited her access to her inhalers and her asthma does seem worse when she is off of it.  However, this time she has been taking her Trelegy and has not had any missed doses.  She is feeling much better than when she came in.  She does have a cat but aside from that cannot think of any other known exposures.    Discussed plan to transition off IV  steroids with hopeful discharge over the weekend.  Will have her establish with our pulmonary clinic as an outpatient and discuss starting biologic therapy given her recurrent eosinophilia and allergic asthma.    Review of Systems   Constitutional:  Negative for chills, fatigue and fever.   HENT:  Positive for congestion. Negative for sore throat.    Respiratory:  Positive for cough, shortness of breath and wheezing. Negative for chest tightness.    Cardiovascular:  Negative for chest pain, palpitations and leg swelling.   Gastrointestinal:  Negative for nausea and vomiting.       Historical Information   Medical History Review: I have reviewed the patient's PMH, PSH, Social History, Family History, Meds, and Allergies   Tobacco History: Never smoker  Occupational History: Noncontributory  Family History:non-contributory    Objective :  Temp:  [97.9 °F (36.6 °C)-98 °F (36.7 °C)] 97.9 °F (36.6 °C)  HR:  [] 128  BP: (109-141)/(56-86) 125/60  Resp:  [18-22] 18  SpO2:  [96 %-98 %] 96 %  O2 Device: None (Room air)    Physical Exam  Vitals reviewed.   Constitutional:       General: She is not in acute distress.     Appearance: She is not toxic-appearing.   HENT:      Mouth/Throat:      Mouth: Mucous membranes are moist.      Pharynx: No oropharyngeal exudate.     Cardiovascular:      Rate and Rhythm: Normal rate and regular rhythm.   Pulmonary:      Effort: Pulmonary effort is normal. No respiratory distress.      Breath sounds: No wheezing, rhonchi or rales.     Neurological:      General: No focal deficit present.      Mental Status: She is alert and oriented to person, place, and time.         Lab Results: I have reviewed the following results:  .     06/13/25  0514   WBC 13.37*   HGB 11.1*   HCT 34.8      SODIUM 135   K 4.3      CO2 20*   BUN 8   CREATININE 0.74   GLUC 158*     ABG: No new results in last 24 hours.    Imaging Results Review: I personally reviewed the following image studies in PACS  and associated radiology reports: chest xray. My interpretation of the radiology images/reports is: CXR without any focal consolidations or effusions.  Other Study Results Review: Other studies reviewed include: PFT's  PFT Results Reviewed: reviewed and interpreted    VTE Prophylaxis: VTE covered by:    None

## 2025-06-14 VITALS
TEMPERATURE: 97.2 F | RESPIRATION RATE: 16 BRPM | WEIGHT: 222.22 LBS | BODY MASS INDEX: 39.38 KG/M2 | HEIGHT: 63 IN | HEART RATE: 79 BPM | OXYGEN SATURATION: 98 % | SYSTOLIC BLOOD PRESSURE: 108 MMHG | DIASTOLIC BLOOD PRESSURE: 65 MMHG

## 2025-06-14 PROCEDURE — 99239 HOSP IP/OBS DSCHRG MGMT >30: CPT | Performed by: INTERNAL MEDICINE

## 2025-06-14 PROCEDURE — 94760 N-INVAS EAR/PLS OXIMETRY 1: CPT

## 2025-06-14 PROCEDURE — 94640 AIRWAY INHALATION TREATMENT: CPT

## 2025-06-14 PROCEDURE — 99232 SBSQ HOSP IP/OBS MODERATE 35: CPT | Performed by: INTERNAL MEDICINE

## 2025-06-14 RX ORDER — FLUTICASONE FUROATE, UMECLIDINIUM BROMIDE AND VILANTEROL TRIFENATATE 200; 62.5; 25 UG/1; UG/1; UG/1
1 POWDER RESPIRATORY (INHALATION) DAILY
Qty: 180 BLISTER | Refills: 0 | Status: SHIPPED | OUTPATIENT
Start: 2025-06-14 | End: 2025-09-12

## 2025-06-14 RX ORDER — PREDNISONE 10 MG/1
TABLET ORAL
Qty: 30 TABLET | Refills: 0 | Status: SHIPPED | OUTPATIENT
Start: 2025-06-15 | End: 2025-06-27

## 2025-06-14 RX ADMIN — ALBUTEROL SULFATE 2 PUFF: 90 AEROSOL, METERED RESPIRATORY (INHALATION) at 06:21

## 2025-06-14 RX ADMIN — LEVALBUTEROL HYDROCHLORIDE 1.25 MG: 1.25 SOLUTION RESPIRATORY (INHALATION) at 07:22

## 2025-06-14 RX ADMIN — GUAIFENESIN 600 MG: 600 TABLET ORAL at 08:30

## 2025-06-14 RX ADMIN — UMECLIDINIUM 1 PUFF: 62.5 AEROSOL, POWDER ORAL at 08:30

## 2025-06-14 RX ADMIN — FLUTICASONE FUROATE AND VILANTEROL TRIFENATATE 1 PUFF: 200; 25 POWDER RESPIRATORY (INHALATION) at 08:30

## 2025-06-14 RX ADMIN — METHYLPREDNISOLONE SODIUM SUCCINATE 40 MG: 40 INJECTION, POWDER, FOR SOLUTION INTRAMUSCULAR; INTRAVENOUS at 06:01

## 2025-06-14 NOTE — DISCHARGE INSTR - AVS FIRST PAGE
You presented to the hospital with asthma exacerbation. Improved with IV sterids and nebulizers.  On discharge you will be on a prednisone taper. Prednisone 40 mg daily for 3 days (start tomorrow 6/15/25), then decrease to 30 mg for 3 days, then 20 mg for 3 days and then 10 mg for 3 days, then stop.  Continue Trelegy and Albuterol inhaler, nebulaizer  You have allergy to dust mites and cat dander.   Please change your pillows and cover your mattress to reduce expose.  We recommend not to sleep with your cat.

## 2025-06-14 NOTE — UTILIZATION REVIEW
"Initial Clinical Review    Admission: Date/Time/Statement:   Admission Orders (From admission, onward)       Ordered        06/12/25 2343  Place in Observation  Once                          Orders Placed This Encounter   Procedures    Place in Observation     Standing Status:   Standing     Number of Occurrences:   1     Level of Care:   Med Surg [16]     ED Arrival Information       Expected   -    Arrival   6/12/2025 21:08    Acuity   Urgent              Means of arrival   Walk-In    Escorted by   Self    Service   Hospitalist    Admission type   Emergency              Arrival complaint   Asthma             Chief Complaint   Patient presents with    Asthma     Pt reports \"asthma exacerbation for a few days\" +cough + wheezing       Initial Presentation: 20 y.o. female presents to ED from home due to several days SOB, wheezing, cough. Using home inhaler regimen w/o relief. She states that she feels her asthma may be worsening as this is her third admission since the end of April . She was referred to pulmonology but no available appointments until November   Exam notes  diffuse expiratory wheezing . On room air O2 sat 96%. CXR no infiltrate  Admit as observation to med surg for severe asthma with exacerbation Plan Consult pulmonology, Continue IV steroids, observe off antibiotic     6/13 Pulmonology    Likely flared from allergen exposure, does have numerous environmental allergies including [place].   Home regimen is Trelegy with as needed DuoNebs as well as Singulair  Eosinophils 2000 this admission  PFTs from 6/2022 showing FEV1/FVC ratio 59% with significant bronchodilator response  Continue Trelegy equivalent and Singulair  Will transition to prednisone 40 mg starting tomorrow with plan for taper of 10 mg every 3 days until off  Will schedule follow-up with outpatient office to discuss starting biologic therapy        ED Treatment-Medication Administration from 06/12/2025 2108 to " 06/13/2025 0033         Date/Time Order Dose Route Action     06/12/2025 2117 albuterol inhalation solution 5 mg 5 mg Nebulization Given     06/12/2025 2117 ipratropium (ATROVENT) 0.02 % inhalation solution 0.5 mg 0.5 mg Nebulization Given     06/12/2025 2157 methylPREDNISolone sodium succinate (Solu-MEDROL) injection 125 mg 125 mg Intravenous Given     06/12/2025 2159 magnesium sulfate 2 g/50 mL IVPB (premix) 2 g 2 g Intravenous New Bag     06/12/2025 2301 albuterol inhalation solution 5 mg 5 mg Nebulization Given            Scheduled Medications:  fluticasone-vilanterol, 1 puff, Inhalation, Daily   And  umeclidinium, 1 puff, Inhalation, Daily  guaiFENesin, 600 mg, Oral, BID  levalbuterol, 1.25 mg, Nebulization, TID  methylPREDNISolone sodium succinate, 40 mg, Intravenous, Q8H  montelukast, 10 mg, Oral, HS      Continuous IV Infusions:     PRN Meds:  acetaminophen, 650 mg, Oral, Q6H PRN  albuterol, 2 puff, Inhalation, Q4H PRN  benzonatate, 100 mg, Oral, TID PRN  hydrOXYzine HCL, 25 mg, Oral, Q6H PRN  ipratropium-albuterol, 3 mL, Nebulization, 4x Daily PRN      ED Triage Vitals   Temperature Pulse Respirations Blood Pressure SpO2 Pain Score   06/13/25 0000 06/12/25 2111 06/12/25 2111 06/12/25 2111 06/12/25 2111 06/13/25 0033   98 °F (36.7 °C) 64 20 109/71 96 % No Pain     Weight (last 2 days)       Date/Time Weight    06/13/25 00:40:14 101 (222.22)    06/12/25 2111 100 (220.9)            Vital Signs (last 3 days)       Date/Time Temp Pulse Resp BP MAP (mmHg) SpO2 O2 Device Patient Position - Orthostatic VS Rafa Coma Scale Score Pain    06/14/25 0832 -- -- -- -- -- -- None (Room air) -- 15 No Pain    06/14/25 07:39:23 97.2 °F (36.2 °C) 79 16 108/65 79 98 % -- -- -- --    06/14/25 0722 -- -- -- -- -- 96 % -- -- -- --    06/13/25 2100 -- 108 18 126/86 -- 96 % -- Lying -- --    06/13/25 2015 -- -- -- -- -- -- -- -- 15 No Pain    06/13/25 1930 -- -- -- -- -- 97 % -- -- -- --    06/13/25 1653 -- -- -- -- -- -- None  (Room air) -- -- --    06/13/25 1353 -- -- -- -- -- 96 % None (Room air) -- -- --    06/13/25 0900 -- -- -- -- -- -- None (Room air) -- 15 No Pain    06/13/25 08:46:57 97.9 °F (36.6 °C) 97 20 121/81 94 96 % None (Room air) -- -- --    06/13/25 0714 -- -- -- -- -- 96 % None (Room air) -- -- --    06/13/25 00:40:14 -- 128 18 125/60 82 96 % None (Room air) Sitting -- --    06/13/25 00:39:03 97.9 °F (36.6 °C) 124 -- 125/60 82 97 % -- -- -- --    06/13/25 0033 -- -- -- -- -- -- None (Room air) -- 15 No Pain    06/13/25 0000 98 °F (36.7 °C) 122 20 141/59 85 96 % None (Room air) Lying -- --    06/12/25 2300 -- 127 -- 121/56 81 98 % None (Room air) Lying -- --    06/12/25 2215 -- 154  22 121/86 97 96 % None (Room air) Lying -- --    06/12/25 2121 -- -- -- -- -- -- -- -- 15 --    06/12/25 2111 -- 64 20 109/71 -- 96 % None (Room air) -- -- --              Pertinent Labs/Diagnostic Test Results:   Radiology:  XR chest 1 view portable   ED Interpretation by Carmita Reynolds DO (06/12 2242)   nad      Final Interpretation by Van Chatterjee MD (06/13 0957)      No acute cardiopulmonary disease.            Workstation performed: [id number]           Cardiology:  No orders to display     GI:  No orders to display       Results from last 7 days   Lab Units 06/13/25  0214   SARS-COV-2  Negative     Results from last 7 days   Lab Units 06/13/25  0514 06/12/25  2352   WBC Thousand/uL 13.37* 17.73*   HEMOGLOBIN g/dL 11.1* 11.9   HEMATOCRIT % 34.8 36.8   PLATELETS Thousands/uL 318 365   TOTAL NEUT ABS Thousands/µL  --  8.93*         Results from last 7 days   Lab Units 06/13/25  0514 06/12/25  2352   SODIUM mmol/L 135 136   POTASSIUM mmol/L 4.3 4.2   CHLORIDE mmol/L 106 107   CO2 mmol/L 20* 20*   ANION GAP mmol/L 9 9   BUN mg/dL 8 9   CREATININE mg/dL 0.74 0.78   EGFR ml/min/1.73sq m 116 109   CALCIUM mg/dL 9.5 9.3             Results from last 7 days   Lab Units 06/13/25  0514 06/12/25  2352   GLUCOSE RANDOM mg/dL 158* 86             No  "results found for: \"BETA-HYDROXYBUTYRATE\"                                                                                           Results from last 7 days   Lab Units 06/13/25  0214   INFLUENZA A PCR  Negative   INFLUENZA B PCR  Negative   RSV PCR  Negative                                               Past Medical History[1]  Present on Admission:   Severe persistent asthma with acute exacerbation   SIRS (systemic inflammatory response syndrome) (HCC)   Anxiety      Admitting Diagnosis: Asthma [J45.909]  Moderate persistent asthma with acute exacerbation [J45.41]  Age/Sex: 20 y.o. female    Network Utilization Review Department  ATTENTION: Please call with any questions or concerns to [phone] and carefully listen to the prompts so that you are directed to the right person. All voicemails are confidential.   For Discharge needs, contact Care Management DC Support Team at [phone] opt. [phone]  Send all requests for admission clinical reviews, approved or denied determinations and any other requests to dedicated fax number below belonging to the campus where the patient is receiving treatment. List of dedicated fax numbers for the Facilities:  FACILITY NAME UR FAX NUMBER   ADMISSION DENIALS (Administrative/Medical Necessity) [phone]   DISCHARGE SUPPORT TEAM (NETWORK) [phone]   PARENT CHILD HEALTH (Maternity/NICU/Pediatrics) [phone]   [facility] [phone]   [facility] [phone]   [facility] [phone]   [facility] [phone]   [facility] [phone]   [facility] [phone]   [facility] [phone]   [facility] [phone]   [facility] [phone]   [facility] [phone]   [facility] " [place] [phone]   [facility] [phone]              [1]   Past Medical History:  Diagnosis Date    Asthma     Leukocytosis 6/21/2022    SIRS (systemic inflammatory response syndrome) (HCC) 12/16/2023

## 2025-06-14 NOTE — PLAN OF CARE
Problem: PAIN - ADULT  Goal: Verbalizes/displays adequate comfort level or baseline comfort level  Description: Interventions:  - Encourage patient to monitor pain and request assistance  - Assess pain using appropriate pain scale  - Administer analgesics as ordered based on type and severity of pain and evaluate response  - Implement non-pharmacological measures as appropriate and evaluate response  - Consider cultural and social influences on pain and pain management  - Notify physician/advanced practitioner if interventions unsuccessful or patient reports new pain  - Educate patient/family on pain management process including their role and importance of  reporting pain   - Provide non-pharmacologic/complimentary pain relief interventions  Outcome: Progressing     Problem: INFECTION - ADULT  Goal: Absence or prevention of progression during hospitalization  Description: INTERVENTIONS:  - Assess and monitor for signs and symptoms of infection  - Monitor lab/diagnostic results  - Monitor all insertion sites, i.e. indwelling lines, tubes, and drains  - Monitor endotracheal if appropriate and nasal secretions for changes in amount and color  - [place] appropriate cooling/warming therapies per order  - Administer medications as ordered  - Instruct and encourage patient and family to use good hand hygiene technique  - Identify and instruct in appropriate isolation precautions for identified infection/condition  Outcome: Progressing  Goal: Absence of fever/infection during neutropenic period  Description: INTERVENTIONS:  - Monitor WBC  - Perform strict hand hygiene  - Limit to healthy visitors only  - No plants, dried, fresh or silk flowers with payne in patient room  Outcome: Progressing     Problem: SAFETY ADULT  Goal: Patient will remain free of falls  Description: INTERVENTIONS:  - Educate patient/family on patient safety including physical limitations  - Instruct patient to call for assistance with activity   -  Consider consulting OT/PT to assist with strengthening/mobility based on AM PAC & JH-HLM score  - Consult OT/PT to assist with strengthening/mobility   - Keep Call bell within reach  - Keep bed low and locked with side rails adjusted as appropriate  - Keep care items and personal belongings within reach  - Initiate and maintain comfort rounds  - Make Fall Risk Sign visible to staff  - Offer Toileting every 2 Hours, in advance of need  - Initiate/Maintain bed alarm  - Obtain necessary fall risk management equipment.  - Apply yellow socks and bracelet for high fall risk patients  - Consider moving patient to room near nurses station  Outcome: Progressing  Goal: Maintain or return to baseline ADL function  Description: INTERVENTIONS:  -  Assess patient's ability to carry out ADLs; assess patient's baseline for ADL function and identify physical deficits which impact ability to perform ADLs (bathing, care of mouth/teeth, toileting, grooming, dressing, etc.)  - Assess/evaluate cause of self-care deficits   - Assess range of motion  - Assess patient's mobility; develop plan if impaired  - Assess patient's need for assistive devices and provide as appropriate  - Encourage maximum independence but intervene and supervise when necessary  - Involve family in performance of ADLs  - Assess for home care needs following discharge   - Consider OT consult to assist with ADL evaluation and planning for discharge  - Provide patient education as appropriate  - Monitor functional capacity and physical performance, use of AM PAC & JH-HLM   - Monitor gait, balance and fatigue with ambulation    Outcome: Progressing  Goal: Maintains/Returns to pre admission functional level  Description: INTERVENTIONS:  - Perform AM-PAC 6 Click Basic Mobility/ Daily Activity assessment daily.  - Set and communicate daily mobility goal to care team and patient/family/caregiver.   - Collaborate with rehabilitation services on mobility goals if consulted  -  Perform Range of Motion 2 times a day.  - Reposition patient every 2 hours.  - Dangle patient 2 times a day  - Stand patient 2 times a day  - Ambulate patient 2 times a day  - Out of bed to chair 2 times a day   - Out of bed for meals 2 times a day  - Out of bed for toileting  - Record patient progress and toleration of activity level   Outcome: Progressing     Problem: DISCHARGE PLANNING  Goal: Discharge to home or other facility with appropriate resources  Description: INTERVENTIONS:  - Identify barriers to discharge w/patient and caregiver  - Arrange for needed discharge resources and transportation as appropriate  - Identify discharge learning needs (meds, wound care, etc.)  - Arrange for interpretive services to assist at discharge as needed  - Refer to Case Management Department for coordinating discharge planning if the patient needs post-hospital services based on physician/advanced practitioner order or complex needs related to functional status, cognitive ability, or social support system  Outcome: Progressing     Problem: Knowledge Deficit  Goal: Patient/family/caregiver demonstrates understanding of disease process, treatment plan, medications, and discharge instructions  Description: Complete learning assessment and assess knowledge base.  Interventions:  - Provide teaching at level of understanding  - Provide teaching via preferred learning methods  Outcome: Progressing

## 2025-06-14 NOTE — ASSESSMENT & PLAN NOTE
Likely flared from allergen exposure, does have numerous environmental allergies including cats and [place].   Home regimen is Trelegy with as needed DuoNebs as well as Singulair    Eosinophils 2000 this admission  PFTs from 6/2022 showing FEV1/FVC ratio 59% with significant bronchodilator response    Discharged on home medication regime Trelegy Singulair albuterol and nebulizers  Will transition IV Solu-Medrol to prednisone 40 mg starting tomorrow taper by 10 mg every 3 days    Will schedule follow-up with outpatient office to discuss starting biologic therapy

## 2025-06-14 NOTE — PROGRESS NOTES
Progress Note - Pulmonology   Name: Lakhwinder Puckett 20 y.o. female I MRN: [id number]  Unit/Bed#: [place] [address] -02 I Date of Admission: 6/12/2025   Date of Service: 6/14/2025 I Hospital Day: 0    Assessment & Plan  Severe persistent asthma with acute exacerbation  Likely flared from allergen exposure, does have numerous environmental allergies including cats and [place] mites [place].   Home regimen is Trelegy with as needed DuoNebs as well as Singulair    Eosinophils 2000 this admission  PFTs from 6/2022 showing FEV1/FVC ratio 59% with significant bronchodilator response    Discharged on home medication regime Trelegy Singulair albuterol and nebulizers  Will transition IV Solu-Medrol to prednisone 40 mg starting tomorrow taper by 10 mg every 3 days    Will schedule follow-up with outpatient office to discuss starting biologic therapy    From a pulmonary standpoint patient appears to be stable.  No further recommendations.  We will sign off.  Please feel free to reach out with any further questions or concerns    24 Hour Events : No overnight events  Subjective : Patient seen and examined at bedside.  Found resting comfortably in bed.  Does not appear in any respiratory distress.  Saturations are stable on room air.  A few faint scattered expiratory wheezes present otherwise CTA.  Significant improvement from admission.  Patient has had multiple emergency room visits and hospitalizations in the past year for asthma exacerbation.  Discussed at length with her allergen triggers she may have in her house.  [place] does show allergy to numerous environmental allergens including dust mites and cat dander.  Primary care team will discuss on discharge patient decreasing exposure to allergens.  We will follow-up with her in the outpatient office    Objective :  Temp:  [97.2 °F (36.2 °C)] 97.2 °F (36.2 °C)  HR:  [] 79  BP: (108-126)/(65-86) 108/65  Resp:  [16-18] 16  SpO2:  [96  %-98 %] 98 %  O2 Device: None (Room air)    Physical Exam  Vitals reviewed.   Constitutional:       General: She is not in acute distress.     Appearance: She is not ill-appearing.   HENT:      Head: Normocephalic and atraumatic.      Right Ear: External ear normal.      Left Ear: External ear normal.      Nose: Nose normal.      Mouth/Throat:      Mouth: Mucous membranes are moist.      Pharynx: Oropharynx is clear.     Eyes:      Extraocular Movements: Extraocular movements intact.      Pupils: Pupils are equal, round, and reactive to light.       Cardiovascular:      Rate and Rhythm: Normal rate and regular rhythm.      Pulses: Normal pulses.      Heart sounds: Normal heart sounds.   Pulmonary:      Effort: Pulmonary effort is normal.      Breath sounds: Wheezing present.      Comments: If you faint expiratory wheezes otherwise CTA  Abdominal:      General: Abdomen is flat.      Palpations: Abdomen is soft.     Musculoskeletal:         General: Normal range of motion.      Cervical back: Normal range of motion and neck supple.      Right lower leg: No edema.      Left lower leg: No edema.     Skin:     General: Skin is warm and dry.     Neurological:      Mental Status: She is alert and oriented to person, place, and time.     Psychiatric:         Mood and Affect: Mood normal.         Behavior: Behavior normal.         Thought Content: Thought content normal.         Judgment: Judgment normal.           Lab Results: I have reviewed the following results:   No new results in last 24 hours.  ABG: No new results in last 24 hours.    Imaging Results Review: I reviewed radiology reports from this admission including: chest xray.  Chest x-ray shows clear lungs no pneumothorax or pleural effusion  Other Study Results Review: Other studies reviewed include: ECHO  Echo November 2024 shows LVEF 65% systolic function normal wall motion normal diastolic function normal  PFT Results Reviewed: reviewed  PFTs from 6/2022  showing FEV1/FVC ratio 59% with significant bronchodilator response

## 2025-06-14 NOTE — ASSESSMENT & PLAN NOTE
Patient presented to the ED secondary to several days of shortness of breath, wheezing, and cough. She reports that she has been taking her nebulizer and inhaler without relief and her symptoms have been keeping her up at night  CXR without acute cardiopulm abnormalities   Viral panel negative  Elevated eosinophils  She was treated with IV Solu-Medrol, nebulizer treatments with significant improvement  Patient will be discharged on a prednisone taper, 40 mg daily and decrease by 10 mg every 3 days  Continue Trelegy, albuterol inhaler/nebulizer  Continue montelukast  Allergy to cat mites and cat dander, advised her to change her pillow, cover her mattress and not to sleep with her cat  Outpatient follow-up with pulmonology

## 2025-06-14 NOTE — ASSESSMENT & PLAN NOTE
Pt fulfilling SIRS secondary to tachycardia and leukocytosis of 17.73 secondary to asthma exacerbation  Pt afebrile and denies any evidence of infection  CXR without evidence of infiltrate. Viral panel negative.   Monitored off antibiotics

## 2025-06-14 NOTE — DISCHARGE SUMMARY
Discharge Summary - Hospitalist   Name: Lakhwinder Puckett 20 y.o. female I MRN: [id number]  Unit/Bed#: [place] [address] -02 I Date of Admission: 6/12/2025   Date of Service: 6/14/2025 I Hospital Day: 0     Assessment & Plan  Severe persistent asthma with acute exacerbation  Patient presented to the ED secondary to several days of shortness of breath, wheezing, and cough. She reports that she has been taking her nebulizer and inhaler without relief and her symptoms have been keeping her up at night  CXR without acute cardiopulm abnormalities   Viral panel negative  Elevated eosinophils  She was treated with IV Solu-Medrol, nebulizer treatments with significant improvement  Patient will be discharged on a prednisone taper, 40 mg daily and decrease by 10 mg every 3 days  Continue Trelegy, albuterol inhaler/nebulizer  Continue montelukast  Allergy to cat mites and cat dander, advised her to change her pillow, cover her mattress and not to sleep with her cat  Outpatient follow-up with pulmonology  SIRS (systemic inflammatory response syndrome) (HCC)  Pt fulfilling SIRS secondary to tachycardia and leukocytosis of 17.73 secondary to asthma exacerbation  Pt afebrile and denies any evidence of infection  CXR without evidence of infiltrate. Viral panel negative.   Monitored off antibiotics  Anxiety  Mood stable  Continue atarax prn     Medical Problems       Resolved Problems  Date Reviewed: 6/14/2025   None       Discharging Physician / Practitioner: Sarah Patel MD  PCP: Elias Schoen, MD  Admission Date:   Admission Orders (From admission, onward)       Ordered        06/12/25 2343  Place in Observation  Once                          Discharge Date: 06/14/25    Next Steps for Physician/AP Assuming Care:  Continue home meds    Test Results Pending at Discharge (will require follow up):  None    Medication Changes for Discharge & Rationale:   Started on prednisone taper  See after visit summary for reconciled discharge  "medications provided to patient and/or family.     Consultations During Hospital Stay:  Pulmonology    Procedures Performed:   None    Significant Findings / Test Results:   Chest x-ray  IMPRESSION:  No acute cardiopulmonary disease.       Incidental Findings:   none       Hospital Course:   Lakhwinder Puckett is a 20 y.o. female patient who originally presented to the hospital on 6/12/2025 due to several days of shortness of breath, wheezing and cough, found to be in asthma exacerbation.  Chest x-ray negative for acute cardiopulmonary disease.  Viral panel negative.  She had elevated eosinophils.  Started on IV Solu-Medrol, nebulizer treatments with significant improvement.  Patient will be discharged on a prednisone taper, start with 40 mg daily and decrease by 10 mg every 3 days.  Continue Trelegy.  Continue albuterol inhaler/nebulizer.  Seen by pulmonology while inpatient, outpatient follow-up with them      Please see above list of diagnoses and related plan for additional information.     Discharge Day Visit / Exam:   Subjective: Patient was seen and evaluated bedside, denies chest pain palpitation shortness of breath, no wheezing  Vitals: Blood Pressure: 108/65 (06/14/25 0739)  Pulse: 79 (06/14/25 0739)  Temperature: (!) 97.2 °F (36.2 °C) (06/14/25 0739)  Temp Source: Oral (06/13/25 0040)  Respirations: 16 (06/14/25 0739)  Height: 5' 3\" (160 cm) (06/13/25 0040)  Weight - Scale: 101 kg (222 lb 3.6 oz) (06/13/25 0040)  SpO2: 98 % (06/14/25 0739)  Physical Exam  Constitutional:       General: She is not in acute distress.     Appearance: She is obese.   HENT:      Head: Atraumatic.     Cardiovascular:      Rate and Rhythm: Normal rate and regular rhythm.      Heart sounds: No murmur heard.  Pulmonary:      Effort: Pulmonary effort is normal. No respiratory distress.      Breath sounds: Normal breath sounds. No wheezing.   Abdominal:      General: There is no distension.      Palpations: Abdomen is soft.      " Tenderness: There is no abdominal tenderness.     Musculoskeletal:         General: No swelling.     Skin:     General: Skin is warm and dry.     Neurological:      General: No focal deficit present.      Mental Status: She is alert and oriented to person, place, and time.      Cranial Nerves: No cranial nerve deficit.     Psychiatric:         Mood and Affect: Mood normal.          Discussion with Family: patient.     Discharge instructions/Information to patient and family:   See after visit summary for information provided to patient and family.      Provisions for Follow-Up Care:  See after visit summary for information related to follow-up care and any pertinent home health orders.      Mobility at time of Discharge:   Basic Mobility Inpatient Raw Score: 24  JH-HLM Goal: 8: Walk 250 feet or more  JH-HLM Achieved: 8: Walk 250 feet ot more  HLM Goal achieved. Continue to encourage appropriate mobility.     Disposition:   Home    Planned Readmission: no    Administrative Statements   Discharge Statement:  I have spent a total time of 40 minutes in caring for this patient on the day of the visit/encounter. >30 minutes of time was spent on: Counseling / Coordination of care, Documenting in the medical record, and Reviewing / ordering tests, medicine, procedures  .    **Please Note: This note may have been constructed using a voice recognition system**

## 2025-06-27 ENCOUNTER — OFFICE VISIT (OUTPATIENT)
Dept: FAMILY MEDICINE CLINIC | Facility: CLINIC | Age: 20
End: 2025-06-27

## 2025-06-27 VITALS
BODY MASS INDEX: 38.98 KG/M2 | OXYGEN SATURATION: 98 % | SYSTOLIC BLOOD PRESSURE: 122 MMHG | HEIGHT: 63 IN | HEART RATE: 121 BPM | TEMPERATURE: 98.5 F | DIASTOLIC BLOOD PRESSURE: 70 MMHG | WEIGHT: 220 LBS | RESPIRATION RATE: 18 BRPM

## 2025-06-27 DIAGNOSIS — J45.51 SEVERE PERSISTENT ASTHMA WITH ACUTE EXACERBATION: Primary | ICD-10-CM

## 2025-06-27 PROCEDURE — 99213 OFFICE O/P EST LOW 20 MIN: CPT

## 2025-06-27 RX ORDER — MONTELUKAST SODIUM 10 MG/1
10 TABLET ORAL
Qty: 30 TABLET | Refills: 11 | Status: SHIPPED | OUTPATIENT
Start: 2025-06-27

## 2025-06-27 RX ORDER — IPRATROPIUM BROMIDE AND ALBUTEROL SULFATE 2.5; .5 MG/3ML; MG/3ML
3 SOLUTION RESPIRATORY (INHALATION) 4 TIMES DAILY
Qty: 180 ML | Refills: 0 | Status: SHIPPED | OUTPATIENT
Start: 2025-06-27

## 2025-06-27 NOTE — ASSESSMENT & PLAN NOTE
Patient has had 10 ED visits or hospital admissions in the last 6 months for asthma exacerbation.   She is currently on montelukast 10 mg daily, Trelegy Ellipta daily, and Duo-Neb PRN. She was also started on a prolonged prednisone taper which she just finished.   She states she has to use the nebulizer around the clock.   On exam her lungs a mildly wheezy, but she is not in acute distress.   We discussed trying to identify triggers including not allowing her cat from her room at any time and cleaning her room to rid it of any cat dander. She has already purchased a hepa filter for her room.   She was previously under the care of a pulmonologist at [facility], but she would like to see an [facility] provider.   Referral placed.     Orders:    Ambulatory Referral to Pulmonology; Future    ipratropium-albuterol (DUO-NEB) 0.5-2.5 mg/3 mL nebulizer solution; Take 3 mL by nebulization 4 (four) times a day    montelukast (SINGULAIR) 10 mg tablet; Take 1 tablet (10 mg total) by mouth daily at bedtime

## 2025-06-27 NOTE — PROGRESS NOTES
Name: Lakhwinder Puckett      : 2005      MRN: [id number]  Encounter Provider: JHONY Gallardo  Encounter Date: 2025   Encounter department: [facility]  :  Assessment & Plan  Severe persistent asthma with acute exacerbation  Patient has had 10 ED visits or hospital admissions in the last 6 months for asthma exacerbation.   She is currently on montelukast 10 mg daily, Trelegy Ellipta daily, and Duo-Neb PRN. She was also started on a prolonged prednisone taper which she just finished.   She states she has to use the nebulizer around the clock.   On exam her lungs a mildly wheezy, but she is not in acute distress.   We discussed trying to identify triggers including not allowing her cat from her room at any time and cleaning her room to rid it of any cat dander. She has already purchased a hepa filter for her room.   She was previously under the care of a pulmonologist at [facility], but she would like to see an [facility] provider.   Referral placed.     Orders:    Ambulatory Referral to Pulmonology; Future    ipratropium-albuterol (DUO-NEB) 0.5-2.5 mg/3 mL nebulizer solution; Take 3 mL by nebulization 4 (four) times a day    montelukast (SINGULAIR) 10 mg tablet; Take 1 tablet (10 mg total) by mouth daily at bedtime           History of Present Illness       Lakhwinder Puckett is a 20 year old female with a history of asthma. She presents to the office today to follow up after a recent hospitalization for asthma exacerbation.     Asthma Follow-up: She is not currently in exacerbation. Symptoms currently include chest tightness, dyspnea, non-productive cough, and wheezing and occur daily. Observed precipitants include no identifiable factor.  Current limitations in activity from asthma: moderate. Number of Emergency Department visits in the previous 6 months: 10. Frequency of use of quick-relief meds: 4 times a day. The patient reports adherence to this regimen.          Review of Systems  "  Constitutional:  Negative for chills and fever.   HENT:  Negative for congestion and sore throat.    Respiratory:  Positive for cough, shortness of breath and wheezing.    Cardiovascular:  Negative for chest pain and palpitations.   Skin:  Negative for rash.   All other systems reviewed and are negative.      Objective   /70 (BP Location: Right arm, Patient Position: Sitting, Cuff Size: Standard)   Pulse (!) 121   Temp 98.5 °F (36.9 °C) (Temporal)   Resp 18   Ht 5' 3\" (1.6 m)   Wt 99.8 kg (220 lb)   LMP 06/21/2025   SpO2 98%   Breastfeeding No   BMI 38.97 kg/m²      Physical Exam  Vitals and nursing note reviewed.   Constitutional:       General: She is not in acute distress.     Appearance: She is well-developed.   HENT:      Head: Normocephalic and atraumatic.     Eyes:      Conjunctiva/sclera: Conjunctivae normal.       Cardiovascular:      Rate and Rhythm: Normal rate and regular rhythm.      Heart sounds: No murmur heard.  Pulmonary:      Effort: Pulmonary effort is normal. No respiratory distress.      Breath sounds: Wheezing present. No rhonchi or rales.      Comments: Very mild wheezing on exam.   Abdominal:      Palpations: Abdomen is soft.      Tenderness: There is no abdominal tenderness.     Musculoskeletal:         General: No swelling.      Cervical back: Neck supple.     Skin:     General: Skin is warm and dry.      Capillary Refill: Capillary refill takes less than 2 seconds.     Neurological:      Mental Status: She is alert.     Psychiatric:         Mood and Affect: Mood normal.         "

## 2025-07-10 ENCOUNTER — OFFICE VISIT (OUTPATIENT)
Dept: PULMONOLOGY | Facility: CLINIC | Age: 20
End: 2025-07-10
Payer: COMMERCIAL

## 2025-07-10 VITALS
SYSTOLIC BLOOD PRESSURE: 120 MMHG | BODY MASS INDEX: 39.34 KG/M2 | DIASTOLIC BLOOD PRESSURE: 70 MMHG | OXYGEN SATURATION: 97 % | HEIGHT: 63 IN | TEMPERATURE: 99 F | HEART RATE: 109 BPM | WEIGHT: 222 LBS

## 2025-07-10 DIAGNOSIS — Z91.09 ENVIRONMENTAL ALLERGIES: ICD-10-CM

## 2025-07-10 DIAGNOSIS — J45.50 SEVERE PERSISTENT ASTHMA WITHOUT COMPLICATION: Primary | ICD-10-CM

## 2025-07-10 PROBLEM — R65.10 SIRS (SYSTEMIC INFLAMMATORY RESPONSE SYNDROME) (HCC): Status: RESOLVED | Noted: 2023-12-16 | Resolved: 2025-07-10

## 2025-07-10 PROBLEM — U07.1 COVID-19: Status: RESOLVED | Noted: 2025-04-21 | Resolved: 2025-07-10

## 2025-07-10 PROCEDURE — 99214 OFFICE O/P EST MOD 30 MIN: CPT | Performed by: INTERNAL MEDICINE

## 2025-07-10 PROCEDURE — 95012 NITRIC OXIDE EXP GAS DETER: CPT | Performed by: INTERNAL MEDICINE

## 2025-07-10 PROCEDURE — 94010 BREATHING CAPACITY TEST: CPT | Performed by: INTERNAL MEDICINE

## 2025-07-10 NOTE — ASSESSMENT & PLAN NOTE
>>ASSESSMENT AND PLAN FOR SEVERE PERSISTENT ASTHMA WITH ACUTE EXACERBATION WRITTEN ON 7/10/2025  3:32 PM BY BRYANT BEDOYA MD    Spirometry today shows mild obstruction  FeNO despite maximal inhaler and allergy therapy remains elevated at 207 ppb  Has had 11% eosinophilia,2000 cells per microliter in June and prior February 2023 [facility] allergen panel with multiple allergen sensitivities  She has had frequent admissions to the emergency room, prednisone courses over the last year and would benefit from Dupixent injections to optimize symptom control. This will be ordered for her today for her severe persistent asthma.    Orders:    Ambulatory Referral to Pulmonology    POCT spirometry    POCT FeNO

## 2025-07-10 NOTE — PROGRESS NOTES
Consultation - Pulmonary Medicine   Name: Lakhwinder Puckett      : 2005      MRN: [id number]  Encounter Provider: Bryant Bedoya MD  Encounter Date: 7/10/2025   Encounter department: [facility]    Requesting Provider:   Zeke Gallardo  [address] [place] [address]   [address] [place] [id number]     Reason for Consult: Severe persistent, uncontrolled asthma:  Assessment & Plan  Severe persistent asthma, uncontrolled with frequent exacerbation   >>ASSESSMENT AND PLAN FOR SEVERE PERSISTENT ASTHMA WITH ACUTE EXACERBATION WRITTEN ON 7/10/2025  3:32 PM BY BRYANT BEDOYA MD    Spirometry today shows mild obstruction  FeNO despite maximal inhaler and allergy therapy remains elevated at 207 ppb  Has had 11% eosinophilia,2000 cells per microliter in  and prior 2023 [facility] allergen panel with multiple allergen sensitivities  She has had frequent admissions to the emergency room, prednisone courses over the last year and would benefit from Dupixent injections to optimize symptom control. This will be ordered for her today for her severe persistent asthma.    Orders:    Ambulatory Referral to Pulmonology    POCT spirometry    POCT FeNO    Environmental allergies  Multiple environmental allergies  Does have a cat at home and has a cat sensitivity  Other allergens, dust, mold, trees and grasses  Continue montelukast, continue fluticasone nasal spray  Continue over-the-counter antihistamine           Return in about 3 months (around 10/10/2025).    History of Present Illness   Lakhwinder Puckett is a 20 y.o. female who presents for evaluation of severe persistent asthma at the request of her primary care provider.  She has been in the emergency room multiple times with need for steroids for exacerbation.  In the past she has also had insurance coverage problems but more recently has stable insurance coverage.  She has been started on Trelegy 200, she is on nebulized DuoNeb,  "montelukast, Flonase, and despite this has had exacerbations.    She does have tightness and wheezing intermittently.  She is feeling better since initiating the Trelegy but still has daily symptomatology and frequent nebulizer use.  She denies any chest pain.  No GERD.  No arthralgias or myalgias.    She does have multiple allergies.  She does have pets in the home.  She has removed all carpets, uses hypoallergenic pillow and mattress covers, and attempts to trigger avoidance as much as possible    Review of systems:  Aside from what is mentioned in the HPI, ROS is otherwise negative    Medical History Reviewed by provider this encounter:  Tobacco  Allergies  Meds  Problems  Med Hx  Surg Hx  Fam Hx     .    Historical Information   Past Medical History[1]  Past Surgical History[2]  Social History   Tobacco Use History[3]    Occupational/Environmental history:  1 cat in the home    Family History:   Family History[4]    Objective   /70   Pulse (!) 109   Temp 99 °F (37.2 °C) (Tympanic)   Ht 5' 3\" (1.6 m)   Wt 101 kg (222 lb)   LMP 06/21/2025   SpO2 97%   BMI 39.33 kg/m²      Physical Exam:   Gen:  Comfortable on room air.  No conversational dyspnea  HEENT:  Conjugate gaze.  sclerae anicteric.  Oropharynx moist  Neck: Trachea is midline. No JVD. No adenopathy  Chest: Symmetric chest wall excursion with slightly distant breath sounds.  No wheezes on today's exam  Cardiac: Mildly tachycardic. no murmur  Abdomen:  benign  Extremities: No edema  Neuro:  Normal speech and mentation    Diagnostic Data:  Labs: I personally reviewed the most recent laboratory data pertinent to today's visit.  CBC from June 2025 shows 11% eosinophilia with an absolute eosinophil count of 2000 cells per microliter    Prior [facility] allergen panel shows multiple aeroallergens sensitivities    Radiology results:    6/12/2025 chest x-ray shows clear lung fields    PFT/spirometry results:  Spirometry from today:  FEV1/FVC: " 52%  FEV1: 2.06 L, 69% predicted  FVC: 3.99 L, 118% predicted.  Flow-volume loop: Obstructive appearance  Impression mild obstructive flow limitation on spirometry with obstructive appearing flow-volume loop    FeNO: 207 ppb which is significantly elevated and consistent with significant type II airway inflammation    Braxton Jolly MD           [1]   Past Medical History:  Diagnosis Date    Asthma     Leukocytosis 6/21/2022    SIRS (systemic inflammatory response syndrome) (HCC) 12/16/2023   [2] No past surgical history on file.  [3]   Social History  Tobacco Use   Smoking Status Never    Passive exposure: Never   Smokeless Tobacco Never   [4]   Family History  Problem Relation Name Age of Onset    Thyroid disease Mother      No Known Problems Father

## 2025-07-10 NOTE — ASSESSMENT & PLAN NOTE
Multiple environmental allergies  Does have a cat at home and has a cat sensitivity  Other allergens, dust, mold, trees and grasses  Continue montelukast, continue fluticasone nasal spray  Continue over-the-counter antihistamine

## 2025-07-10 NOTE — ASSESSMENT & PLAN NOTE
Spirometry today shows mild obstruction  FeNO despite maximal inhaler and allergy therapy remains elevated at 207 ppb  Has had 11% eosinophilia,2000 cells per microliter in June and prior February 2023 [facility]en panel with multiple allergen sensitivities  She has had frequent admissions to the emergency room, prednisone courses over the last year and would benefit from Dupixent injections to optimize symptom control. This will be ordered for her today for her severe persistent asthma.

## 2025-07-16 ENCOUNTER — TELEPHONE (OUTPATIENT)
Dept: PULMONOLOGY | Facility: CLINIC | Age: 20
End: 2025-07-16

## 2025-07-16 NOTE — TELEPHONE ENCOUNTER
Colem for pt in regards to scheduling 3m follow up around (10/10/25) with Dr. Jolly in [facility] office.

## 2025-07-21 DIAGNOSIS — J45.51 SEVERE PERSISTENT ASTHMA WITH ACUTE EXACERBATION: ICD-10-CM

## 2025-07-21 RX ORDER — MONTELUKAST SODIUM 10 MG/1
10 TABLET ORAL
Qty: 90 TABLET | Refills: 4 | Status: SHIPPED | OUTPATIENT
Start: 2025-07-21

## 2025-07-31 ENCOUNTER — TELEPHONE (OUTPATIENT)
Dept: PULMONOLOGY | Facility: CLINIC | Age: 20
End: 2025-07-31